# Patient Record
Sex: FEMALE | Race: BLACK OR AFRICAN AMERICAN | Employment: OTHER | URBAN - METROPOLITAN AREA
[De-identification: names, ages, dates, MRNs, and addresses within clinical notes are randomized per-mention and may not be internally consistent; named-entity substitution may affect disease eponyms.]

---

## 2017-12-30 ENCOUNTER — HOSPITAL ENCOUNTER (OUTPATIENT)
Dept: CT IMAGING | Facility: HOSPITAL | Age: 72
Discharge: HOME | End: 2017-12-30
Attending: SPECIALIST
Payer: MEDICARE

## 2017-12-30 ENCOUNTER — HOSPITAL ENCOUNTER (OUTPATIENT)
Dept: MAMMOGRAPHY | Facility: HOSPITAL | Age: 72
Discharge: HOME | End: 2017-12-30
Attending: SPECIALIST
Payer: MEDICARE

## 2017-12-30 DIAGNOSIS — Z12.39 ENCOUNTER FOR SCREENING FOR MALIGNANT NEOPLASM OF BREAST: ICD-10-CM

## 2017-12-30 DIAGNOSIS — R91.8 LUNG MASS: ICD-10-CM

## 2017-12-30 LAB — CREAT SERPL-MCNC: 0.9 MG/DL (ref 0.55–1.02)

## 2017-12-30 PROCEDURE — 82565 ASSAY OF CREATININE: CPT

## 2017-12-30 PROCEDURE — 71260 CT THORAX DX C+: CPT | Performed by: SPECIALIST

## 2017-12-30 PROCEDURE — 77067 SCR MAMMO BI INCL CAD: CPT | Performed by: SPECIALIST

## 2018-02-20 ENCOUNTER — APPOINTMENT (OUTPATIENT)
Dept: GENERAL RADIOLOGY | Facility: HOSPITAL | Age: 73
End: 2018-02-20
Attending: EMERGENCY MEDICINE
Payer: MEDICARE

## 2018-02-20 ENCOUNTER — HOSPITAL ENCOUNTER (EMERGENCY)
Facility: HOSPITAL | Age: 73
Discharge: HOME | End: 2018-02-20
Attending: EMERGENCY MEDICINE
Payer: MEDICARE

## 2018-02-20 VITALS
SYSTOLIC BLOOD PRESSURE: 148 MMHG | HEIGHT: 64 IN | WEIGHT: 198 LBS | DIASTOLIC BLOOD PRESSURE: 70 MMHG | TEMPERATURE: 98 F | BODY MASS INDEX: 33.8 KG/M2 | OXYGEN SATURATION: 98 % | HEART RATE: 88 BPM | RESPIRATION RATE: 18 BRPM

## 2018-02-20 DIAGNOSIS — D86.9 SARCOIDOSIS: ICD-10-CM

## 2018-02-20 DIAGNOSIS — J40 BRONCHITIS: Primary | ICD-10-CM

## 2018-02-20 LAB
ALBUMIN SERPL-MCNC: 3.4 G/DL (ref 3.5–4.8)
ALP LIVER SERPL-CCNC: 115 U/L (ref 55–142)
ALT SERPL-CCNC: 17 U/L (ref 14–54)
AST SERPL-CCNC: 18 U/L (ref 15–41)
BASOPHILS # BLD AUTO: 0.04 X10(3) UL (ref 0–0.1)
BASOPHILS NFR BLD AUTO: 0.4 %
BILIRUB SERPL-MCNC: 0.4 MG/DL (ref 0.1–2)
BUN BLD-MCNC: 15 MG/DL (ref 8–20)
CALCIUM BLD-MCNC: 8.9 MG/DL (ref 8.3–10.3)
CHLORIDE: 109 MMOL/L (ref 101–111)
CO2: 29 MMOL/L (ref 22–32)
CREAT BLD-MCNC: 0.91 MG/DL (ref 0.55–1.02)
EOSINOPHIL # BLD AUTO: 0.21 X10(3) UL (ref 0–0.3)
EOSINOPHIL NFR BLD AUTO: 2.3 %
ERYTHROCYTE [DISTWIDTH] IN BLOOD BY AUTOMATED COUNT: 12.5 % (ref 11.5–16)
GLUCOSE BLD-MCNC: 101 MG/DL (ref 70–99)
HCT VFR BLD AUTO: 35.5 % (ref 34–50)
HGB BLD-MCNC: 11.2 G/DL (ref 12–16)
IMMATURE GRANULOCYTE COUNT: 0.01 X10(3) UL (ref 0–1)
IMMATURE GRANULOCYTE RATIO %: 0.1 %
LYMPHOCYTES # BLD AUTO: 4.88 X10(3) UL (ref 0.9–4)
LYMPHOCYTES NFR BLD AUTO: 52.6 %
M PROTEIN MFR SERPL ELPH: 7.7 G/DL (ref 6.1–8.3)
MCH RBC QN AUTO: 27.9 PG (ref 27–33.2)
MCHC RBC AUTO-ENTMCNC: 31.5 G/DL (ref 31–37)
MCV RBC AUTO: 88.5 FL (ref 81–100)
MONOCYTES # BLD AUTO: 0.96 X10(3) UL (ref 0.1–1)
MONOCYTES NFR BLD AUTO: 10.3 %
NEUTROPHIL ABS PRELIM: 3.18 X10 (3) UL (ref 1.3–6.7)
NEUTROPHILS # BLD AUTO: 3.18 X10(3) UL (ref 1.3–6.7)
NEUTROPHILS NFR BLD AUTO: 34.3 %
PLATELET # BLD AUTO: 274 10(3)UL (ref 150–450)
POTASSIUM SERPL-SCNC: 3.7 MMOL/L (ref 3.6–5.1)
RBC # BLD AUTO: 4.01 X10(6)UL (ref 3.8–5.1)
RED CELL DISTRIBUTION WIDTH-SD: 40 FL (ref 35.1–46.3)
SODIUM SERPL-SCNC: 143 MMOL/L (ref 136–144)
TROPONIN: <0.046 NG/ML (ref ?–0.05)
WBC # BLD AUTO: 9.3 X10(3) UL (ref 4–13)

## 2018-02-20 PROCEDURE — 99284 EMERGENCY DEPT VISIT MOD MDM: CPT

## 2018-02-20 PROCEDURE — 80053 COMPREHEN METABOLIC PANEL: CPT | Performed by: EMERGENCY MEDICINE

## 2018-02-20 PROCEDURE — 94640 AIRWAY INHALATION TREATMENT: CPT

## 2018-02-20 PROCEDURE — 71045 X-RAY EXAM CHEST 1 VIEW: CPT | Performed by: EMERGENCY MEDICINE

## 2018-02-20 PROCEDURE — 84484 ASSAY OF TROPONIN QUANT: CPT | Performed by: EMERGENCY MEDICINE

## 2018-02-20 PROCEDURE — 85025 COMPLETE CBC W/AUTO DIFF WBC: CPT | Performed by: EMERGENCY MEDICINE

## 2018-02-20 PROCEDURE — 36415 COLL VENOUS BLD VENIPUNCTURE: CPT

## 2018-02-20 PROCEDURE — 94664 DEMO&/EVAL PT USE INHALER: CPT

## 2018-02-20 RX ORDER — METOCLOPRAMIDE HYDROCHLORIDE 5 MG/5ML
5 SOLUTION ORAL
COMMUNITY
End: 2019-01-02

## 2018-02-20 RX ORDER — ALBUTEROL SULFATE 90 UG/1
AEROSOL, METERED RESPIRATORY (INHALATION) EVERY 6 HOURS PRN
COMMUNITY
End: 2018-12-11 | Stop reason: ALTCHOICE

## 2018-02-20 RX ORDER — IPRATROPIUM BROMIDE AND ALBUTEROL SULFATE 2.5; .5 MG/3ML; MG/3ML
3 SOLUTION RESPIRATORY (INHALATION) ONCE
Status: COMPLETED | OUTPATIENT
Start: 2018-02-20 | End: 2018-02-20

## 2018-02-20 RX ORDER — ALBUTEROL SULFATE 2.5 MG/3ML
SOLUTION RESPIRATORY (INHALATION) EVERY 6 HOURS PRN
COMMUNITY
End: 2019-09-20

## 2018-02-20 RX ORDER — PREDNISONE 20 MG/1
60 TABLET ORAL ONCE
Status: COMPLETED | OUTPATIENT
Start: 2018-02-20 | End: 2018-02-20

## 2018-02-20 RX ORDER — AZITHROMYCIN 250 MG/1
TABLET, FILM COATED ORAL
Qty: 1 PACKAGE | Refills: 0 | Status: SHIPPED | OUTPATIENT
Start: 2018-02-20 | End: 2018-04-18

## 2018-02-20 RX ORDER — PREDNISONE 20 MG/1
20 TABLET ORAL ONCE
Status: DISCONTINUED | OUTPATIENT
Start: 2018-02-20 | End: 2018-02-20

## 2018-02-20 RX ORDER — HYDROCODONE BITARTRATE AND ACETAMINOPHEN 10; 300 MG/1; MG/1
TABLET ORAL
COMMUNITY
End: 2019-01-02

## 2018-02-20 RX ORDER — POTASSIUM CHLORIDE 750 MG/1
10 TABLET, EXTENDED RELEASE ORAL 2 TIMES DAILY
COMMUNITY
End: 2019-01-02

## 2018-02-20 RX ORDER — CODEINE PHOSPHATE AND GUAIFENESIN 10; 100 MG/5ML; MG/5ML
5 SOLUTION ORAL EVERY 6 HOURS PRN
Qty: 180 ML | Refills: 0 | Status: SHIPPED | OUTPATIENT
Start: 2018-02-20 | End: 2018-04-18

## 2018-02-20 RX ORDER — LYSINE HCL 500 MG
1 TABLET ORAL DAILY
COMMUNITY
End: 2019-09-20

## 2018-02-20 RX ORDER — METHYLPREDNISOLONE 4 MG/1
TABLET ORAL
Qty: 1 PACKAGE | Refills: 0 | Status: SHIPPED | OUTPATIENT
Start: 2018-02-20 | End: 2018-04-18

## 2018-02-20 RX ORDER — VALSARTAN 160 MG/1
160 TABLET ORAL DAILY
COMMUNITY
End: 2019-01-02

## 2018-02-20 RX ORDER — PREDNISONE 20 MG/1
60 TABLET ORAL ONCE
Status: DISCONTINUED | OUTPATIENT
Start: 2018-02-20 | End: 2018-02-20

## 2018-02-20 RX ORDER — FUROSEMIDE 40 MG/1
20 TABLET ORAL DAILY
COMMUNITY
End: 2019-01-02

## 2018-02-20 NOTE — ED PROVIDER NOTES
Patient Seen in: [facility] Emergency Department    History   Patient presents with:  Dyspnea IRIS SOB (respiratory)  Cough/URI    Stated Complaint: cough/dyspnea    HPI    69-year-old female presents emergency department who is here for shortness of pharynx  Neck: Supple no JVD no lymphadenopathy no meningismus no carotid bruit  CV: Regular rate and rhythm no murmur rub  Respiratory: Coarse breath sounds bilaterally. There is some crackles in the right base.   Abdomen: Soft nontender nondistended, no She was requesting some steroids along with an antibiotic. I will give her a Z-Alin along with Medrol Dosepak. She did receive 60 mg of prednisone here.   She is very comfortable going home at this point but told her if she starts to worsen she definitely

## 2018-02-20 NOTE — ED NOTES
Patient is a very difficult IV stick Dr. Vero Gomes is aware.  PO medication will be given blood was sent

## 2018-04-18 ENCOUNTER — HOSPITAL ENCOUNTER (EMERGENCY)
Facility: HOSPITAL | Age: 73
Discharge: HOME | End: 2018-04-18
Attending: EMERGENCY MEDICINE
Payer: MEDICARE

## 2018-04-18 ENCOUNTER — APPOINTMENT (OUTPATIENT)
Dept: GENERAL RADIOLOGY | Facility: HOSPITAL | Age: 73
End: 2018-04-18
Payer: MEDICARE

## 2018-04-18 VITALS
RESPIRATION RATE: 16 BRPM | HEART RATE: 78 BPM | WEIGHT: 190 LBS | SYSTOLIC BLOOD PRESSURE: 175 MMHG | DIASTOLIC BLOOD PRESSURE: 89 MMHG | TEMPERATURE: 98 F | HEIGHT: 64 IN | OXYGEN SATURATION: 98 % | BODY MASS INDEX: 32.44 KG/M2

## 2018-04-18 DIAGNOSIS — J18.9 COMMUNITY ACQUIRED PNEUMONIA, UNSPECIFIED LATERALITY: Primary | ICD-10-CM

## 2018-04-18 PROCEDURE — 71045 X-RAY EXAM CHEST 1 VIEW: CPT

## 2018-04-18 PROCEDURE — 85025 COMPLETE CBC W/AUTO DIFF WBC: CPT | Performed by: EMERGENCY MEDICINE

## 2018-04-18 PROCEDURE — 99285 EMERGENCY DEPT VISIT HI MDM: CPT

## 2018-04-18 PROCEDURE — 80053 COMPREHEN METABOLIC PANEL: CPT

## 2018-04-18 PROCEDURE — 93010 ELECTROCARDIOGRAM REPORT: CPT

## 2018-04-18 PROCEDURE — 36415 COLL VENOUS BLD VENIPUNCTURE: CPT

## 2018-04-18 PROCEDURE — 84484 ASSAY OF TROPONIN QUANT: CPT | Performed by: EMERGENCY MEDICINE

## 2018-04-18 PROCEDURE — 80053 COMPREHEN METABOLIC PANEL: CPT | Performed by: EMERGENCY MEDICINE

## 2018-04-18 PROCEDURE — 85025 COMPLETE CBC W/AUTO DIFF WBC: CPT

## 2018-04-18 PROCEDURE — 84484 ASSAY OF TROPONIN QUANT: CPT

## 2018-04-18 PROCEDURE — 93005 ELECTROCARDIOGRAM TRACING: CPT

## 2018-04-18 RX ORDER — CEFDINIR 300 MG/1
300 CAPSULE ORAL 2 TIMES DAILY
Qty: 20 CAPSULE | Refills: 0 | Status: SHIPPED | OUTPATIENT
Start: 2018-04-18 | End: 2018-04-28

## 2018-04-18 RX ORDER — CEFDINIR 300 MG/1
300 CAPSULE ORAL ONCE
Status: COMPLETED | OUTPATIENT
Start: 2018-04-18 | End: 2018-04-18

## 2018-04-18 NOTE — ED INITIAL ASSESSMENT (HPI)
Patient reports productive cough since Monday, green in color and sometimes blood tinged. Reports hx of pneumonia and sarcoidosis. Reports pain to back with deep breaths. Denies fever.

## 2018-04-19 NOTE — ED PROVIDER NOTES
Patient Seen in: [facility] Emergency Department    History   Patient presents with:  Cough/URI    Stated Complaint: cough, dyspnea, pain with deeo inspiration    HPI    Since to the emergency department with cough productive of green sputum since M in HPI. Constitutional and vital signs reviewed. All other systems reviewed and negative except as noted above.     Physical Exam   ED Triage Vitals [04/18/18 1802]  BP: 142/68  Pulse: 85  Resp: 20  Temp: 97.8 °F (36.6 °C)  Temp src: Temporal  SpO2: 9 normal limits   CBC W/ DIFFERENTIAL - Abnormal; Notable for the following:     Neutrophil Absolute Prelim 7.28 (*)     Neutrophil Absolute 7.28 (*)     All other components within normal limits   TROPONIN I - Normal   CBC WITH DIFFERENTIAL WITH PLATELET stable. No sign of pneumothorax.                         Dictated by: Sarah Villanueva MD on 4/18/2018 at 19:44         Approved by: Sarah Villanueva MD                   [facility]     Patient presents to the emergency department with a cough productive of green s

## 2018-05-01 ENCOUNTER — HOSPITAL ENCOUNTER (INPATIENT)
Facility: HOSPITAL | Age: 73
LOS: 3 days | Discharge: HOME HEALTH | DRG: 194 | End: 2018-05-05
Admitting: SPECIALIST
Payer: MEDICARE

## 2018-05-01 ENCOUNTER — APPOINTMENT (OUTPATIENT)
Dept: GENERAL RADIOLOGY | Facility: HOSPITAL | Age: 73
DRG: 194 | End: 2018-05-01
Payer: MEDICARE

## 2018-05-01 DIAGNOSIS — J18.9 COMMUNITY ACQUIRED PNEUMONIA OF RIGHT UPPER LOBE OF LUNG: Primary | ICD-10-CM

## 2018-05-01 DIAGNOSIS — D86.9 SARCOID: ICD-10-CM

## 2018-05-01 PROCEDURE — 84484 ASSAY OF TROPONIN QUANT: CPT

## 2018-05-01 PROCEDURE — 83880 ASSAY OF NATRIURETIC PEPTIDE: CPT

## 2018-05-01 PROCEDURE — 93010 ELECTROCARDIOGRAM REPORT: CPT

## 2018-05-01 PROCEDURE — 99285 EMERGENCY DEPT VISIT HI MDM: CPT

## 2018-05-01 PROCEDURE — 93005 ELECTROCARDIOGRAM TRACING: CPT

## 2018-05-01 PROCEDURE — 80053 COMPREHEN METABOLIC PANEL: CPT

## 2018-05-01 PROCEDURE — 87040 BLOOD CULTURE FOR BACTERIA: CPT

## 2018-05-01 PROCEDURE — 81001 URINALYSIS AUTO W/SCOPE: CPT

## 2018-05-01 PROCEDURE — 87086 URINE CULTURE/COLONY COUNT: CPT

## 2018-05-01 PROCEDURE — 85730 THROMBOPLASTIN TIME PARTIAL: CPT

## 2018-05-01 PROCEDURE — 85610 PROTHROMBIN TIME: CPT

## 2018-05-01 PROCEDURE — 36415 COLL VENOUS BLD VENIPUNCTURE: CPT

## 2018-05-01 PROCEDURE — 96375 TX/PRO/DX INJ NEW DRUG ADDON: CPT

## 2018-05-01 PROCEDURE — 85025 COMPLETE CBC W/AUTO DIFF WBC: CPT

## 2018-05-01 PROCEDURE — 83605 ASSAY OF LACTIC ACID: CPT

## 2018-05-01 PROCEDURE — 94640 AIRWAY INHALATION TREATMENT: CPT

## 2018-05-01 PROCEDURE — 71045 X-RAY EXAM CHEST 1 VIEW: CPT

## 2018-05-01 PROCEDURE — 82164 ANGIOTENSIN I ENZYME TEST: CPT | Performed by: INTERNAL MEDICINE

## 2018-05-01 PROCEDURE — 96365 THER/PROPH/DIAG IV INF INIT: CPT

## 2018-05-01 RX ORDER — IPRATROPIUM BROMIDE AND ALBUTEROL SULFATE 2.5; .5 MG/3ML; MG/3ML
3 SOLUTION RESPIRATORY (INHALATION) ONCE
Status: COMPLETED | OUTPATIENT
Start: 2018-05-01 | End: 2018-05-01

## 2018-05-01 RX ORDER — LEVOFLOXACIN 5 MG/ML
750 INJECTION, SOLUTION INTRAVENOUS EVERY 24 HOURS
Status: DISCONTINUED | OUTPATIENT
Start: 2018-05-01 | End: 2018-05-02

## 2018-05-02 ENCOUNTER — APPOINTMENT (OUTPATIENT)
Dept: CT IMAGING | Facility: HOSPITAL | Age: 73
DRG: 194 | End: 2018-05-02
Payer: MEDICARE

## 2018-05-02 PROBLEM — D86.9 SARCOID: Status: ACTIVE | Noted: 2018-05-02

## 2018-05-02 PROCEDURE — 87798 DETECT AGENT NOS DNA AMP: CPT

## 2018-05-02 PROCEDURE — 94664 DEMO&/EVAL PT USE INHALER: CPT

## 2018-05-02 PROCEDURE — S0028 INJECTION, FAMOTIDINE, 20 MG: HCPCS

## 2018-05-02 PROCEDURE — 94640 AIRWAY INHALATION TREATMENT: CPT

## 2018-05-02 PROCEDURE — 84145 PROCALCITONIN (PCT): CPT | Performed by: INTERNAL MEDICINE

## 2018-05-02 PROCEDURE — 87633 RESP VIRUS 12-25 TARGETS: CPT

## 2018-05-02 PROCEDURE — 80053 COMPREHEN METABOLIC PANEL: CPT | Performed by: INTERNAL MEDICINE

## 2018-05-02 PROCEDURE — 85652 RBC SED RATE AUTOMATED: CPT | Performed by: INTERNAL MEDICINE

## 2018-05-02 PROCEDURE — 87999 UNLISTED MICROBIOLOGY PX: CPT

## 2018-05-02 PROCEDURE — 86140 C-REACTIVE PROTEIN: CPT | Performed by: INTERNAL MEDICINE

## 2018-05-02 PROCEDURE — 84132 ASSAY OF SERUM POTASSIUM: CPT | Performed by: INTERNAL MEDICINE

## 2018-05-02 PROCEDURE — 85025 COMPLETE CBC W/AUTO DIFF WBC: CPT | Performed by: INTERNAL MEDICINE

## 2018-05-02 PROCEDURE — 83880 ASSAY OF NATRIURETIC PEPTIDE: CPT | Performed by: INTERNAL MEDICINE

## 2018-05-02 PROCEDURE — 87486 CHLMYD PNEUM DNA AMP PROBE: CPT

## 2018-05-02 PROCEDURE — 85378 FIBRIN DEGRADE SEMIQUANT: CPT | Performed by: INTERNAL MEDICINE

## 2018-05-02 PROCEDURE — 87581 M.PNEUMON DNA AMP PROBE: CPT

## 2018-05-02 RX ORDER — DIPHENHYDRAMINE HYDROCHLORIDE 50 MG/ML
25 INJECTION INTRAMUSCULAR; INTRAVENOUS EVERY 6 HOURS PRN
Status: DISCONTINUED | OUTPATIENT
Start: 2018-05-02 | End: 2018-05-05

## 2018-05-02 RX ORDER — ACETAMINOPHEN 325 MG/1
650 TABLET ORAL EVERY 6 HOURS PRN
Status: DISCONTINUED | OUTPATIENT
Start: 2018-05-02 | End: 2018-05-05

## 2018-05-02 RX ORDER — HEPARIN SODIUM 5000 [USP'U]/ML
5000 INJECTION, SOLUTION INTRAVENOUS; SUBCUTANEOUS 2 TIMES DAILY
Status: DISCONTINUED | OUTPATIENT
Start: 2018-05-02 | End: 2018-05-05

## 2018-05-02 RX ORDER — METHYLPREDNISOLONE SODIUM SUCCINATE 125 MG/2ML
80 INJECTION, POWDER, LYOPHILIZED, FOR SOLUTION INTRAMUSCULAR; INTRAVENOUS EVERY 8 HOURS
Status: DISCONTINUED | OUTPATIENT
Start: 2018-05-02 | End: 2018-05-02

## 2018-05-02 RX ORDER — FAMOTIDINE 10 MG/ML
20 INJECTION, SOLUTION INTRAVENOUS ONCE
Status: COMPLETED | OUTPATIENT
Start: 2018-05-02 | End: 2018-05-02

## 2018-05-02 RX ORDER — HYDROCODONE BITARTRATE AND ACETAMINOPHEN 10; 325 MG/1; MG/1
1 TABLET ORAL EVERY 6 HOURS PRN
Status: DISCONTINUED | OUTPATIENT
Start: 2018-05-02 | End: 2018-05-05

## 2018-05-02 RX ORDER — POTASSIUM CHLORIDE 20 MEQ/1
40 TABLET, EXTENDED RELEASE ORAL EVERY 4 HOURS
Status: COMPLETED | OUTPATIENT
Start: 2018-05-02 | End: 2018-05-02

## 2018-05-02 RX ORDER — ACETAMINOPHEN 500 MG
1000 TABLET ORAL ONCE
Status: COMPLETED | OUTPATIENT
Start: 2018-05-02 | End: 2018-05-02

## 2018-05-02 RX ORDER — METHYLPREDNISOLONE SODIUM SUCCINATE 125 MG/2ML
60 INJECTION, POWDER, LYOPHILIZED, FOR SOLUTION INTRAMUSCULAR; INTRAVENOUS EVERY 12 HOURS
Status: DISCONTINUED | OUTPATIENT
Start: 2018-05-02 | End: 2018-05-04

## 2018-05-02 RX ORDER — SODIUM PHOSPHATE, DIBASIC AND SODIUM PHOSPHATE, MONOBASIC 7; 19 G/133ML; G/133ML
1 ENEMA RECTAL ONCE AS NEEDED
Status: ACTIVE | OUTPATIENT
Start: 2018-05-02 | End: 2018-05-02

## 2018-05-02 RX ORDER — SULFAMETHOXAZOLE AND TRIMETHOPRIM 80; 16 MG/ML; MG/ML
320 INJECTION, SOLUTION, CONCENTRATE INTRAVENOUS ONCE
Status: COMPLETED | OUTPATIENT
Start: 2018-05-02 | End: 2018-05-02

## 2018-05-02 RX ORDER — SULFAMETHOXAZOLE AND TRIMETHOPRIM 80; 16 MG/ML; MG/ML
320 INJECTION, SOLUTION, CONCENTRATE INTRAVENOUS EVERY 8 HOURS
Status: DISCONTINUED | OUTPATIENT
Start: 2018-05-02 | End: 2018-05-02

## 2018-05-02 RX ORDER — DOCUSATE SODIUM 100 MG/1
100 CAPSULE, LIQUID FILLED ORAL 2 TIMES DAILY
Status: DISCONTINUED | OUTPATIENT
Start: 2018-05-02 | End: 2018-05-05

## 2018-05-02 RX ORDER — SULFAMETHOXAZOLE AND TRIMETHOPRIM 80; 16 MG/ML; MG/ML
80 INJECTION, SOLUTION, CONCENTRATE INTRAVENOUS EVERY 6 HOURS
Status: DISCONTINUED | OUTPATIENT
Start: 2018-05-02 | End: 2018-05-02 | Stop reason: SDUPTHER

## 2018-05-02 RX ORDER — METOCLOPRAMIDE 5 MG/1
5 TABLET ORAL
Status: DISCONTINUED | OUTPATIENT
Start: 2018-05-02 | End: 2018-05-05

## 2018-05-02 RX ORDER — METOCLOPRAMIDE HYDROCHLORIDE 5 MG/5ML
5 SOLUTION ORAL
Status: DISCONTINUED | OUTPATIENT
Start: 2018-05-02 | End: 2018-05-02

## 2018-05-02 RX ORDER — METHYLPREDNISOLONE SODIUM SUCCINATE 125 MG/2ML
125 INJECTION, POWDER, LYOPHILIZED, FOR SOLUTION INTRAMUSCULAR; INTRAVENOUS ONCE
Status: COMPLETED | OUTPATIENT
Start: 2018-05-02 | End: 2018-05-02

## 2018-05-02 RX ORDER — FUROSEMIDE 40 MG/1
40 TABLET ORAL
Status: DISCONTINUED | OUTPATIENT
Start: 2018-05-02 | End: 2018-05-03

## 2018-05-02 RX ORDER — LOSARTAN POTASSIUM 100 MG/1
100 TABLET ORAL DAILY
Status: DISCONTINUED | OUTPATIENT
Start: 2018-05-02 | End: 2018-05-05

## 2018-05-02 RX ORDER — BISACODYL 10 MG
10 SUPPOSITORY, RECTAL RECTAL
Status: DISCONTINUED | OUTPATIENT
Start: 2018-05-02 | End: 2018-05-05

## 2018-05-02 RX ORDER — POTASSIUM CHLORIDE 20 MEQ/1
40 TABLET, EXTENDED RELEASE ORAL ONCE
Status: COMPLETED | OUTPATIENT
Start: 2018-05-02 | End: 2018-05-02

## 2018-05-02 RX ORDER — ALBUTEROL SULFATE 90 UG/1
2 AEROSOL, METERED RESPIRATORY (INHALATION) EVERY 6 HOURS PRN
Status: DISCONTINUED | OUTPATIENT
Start: 2018-05-02 | End: 2018-05-05

## 2018-05-02 RX ORDER — DIPHENHYDRAMINE HYDROCHLORIDE 50 MG/ML
25 INJECTION INTRAMUSCULAR; INTRAVENOUS ONCE
Status: COMPLETED | OUTPATIENT
Start: 2018-05-02 | End: 2018-05-02

## 2018-05-02 RX ORDER — HYDRALAZINE HYDROCHLORIDE 20 MG/ML
10 INJECTION INTRAMUSCULAR; INTRAVENOUS EVERY 6 HOURS PRN
Status: DISCONTINUED | OUTPATIENT
Start: 2018-05-02 | End: 2018-05-05

## 2018-05-02 RX ORDER — ALBUTEROL SULFATE 2.5 MG/3ML
2.5 SOLUTION RESPIRATORY (INHALATION)
Status: DISCONTINUED | OUTPATIENT
Start: 2018-05-02 | End: 2018-05-05

## 2018-05-02 RX ORDER — POTASSIUM CHLORIDE 750 MG/1
10 TABLET, EXTENDED RELEASE ORAL 2 TIMES DAILY
Status: DISCONTINUED | OUTPATIENT
Start: 2018-05-02 | End: 2018-05-05

## 2018-05-02 RX ORDER — MULTIPLE VITAMINS W/ MINERALS TAB 9MG-400MCG
1 TAB ORAL DAILY
Status: DISCONTINUED | OUTPATIENT
Start: 2018-05-02 | End: 2018-05-05

## 2018-05-02 NOTE — PROGRESS NOTES
[facility] Pharmacy Note:  Renal Adjustment for sulfamethoxazole-trimethoprim (BACTRIM)    Kelsie Child is a 68year old female who has been prescribed sulfamethoxazole-trimethoprim (BACTRIM) 320 mg every 12 hrs.   CrCl is estimated creatinine clearance i

## 2018-05-02 NOTE — ED INITIAL ASSESSMENT (HPI)
Pt to ED c/o \"not feeling better\". Pt was seen here on 4/18 for Pneumonia, Cefdinir tx completed. Pt states still having productive cough with green sputum. Denies fever but feels chills.

## 2018-05-02 NOTE — CONSULTS
[facility]  Report of Consultation    Dinajodie DanNaty Patient Status:  Inpatient    1945 MRN [id number]   [facility] 4NW-A Attending Delmy Castaneda MD   Hosp Day # 0 PCP Genaro Clark MD     Reason for Consultation: Co hospitalization. It would appear that she refused bronchoscopy and both occasions and the site of the bleeding is not clear (although there is compression of her right pulmonary artery by lymph nodes in the hilar/mediastinal region).     PAST HISTORY:    · and weight loss. Eyes: Negative for visual disturbance, irritation and redness. Ears, nose, mouth, throat, and face: Negative for hearing loss, tinnitus, nasal congestion, snoring, sore throat, hoarseness and voice change. Respiratory: See HPI above.   C hemithorax without evidence of wheezing or consolidation   Chest wall: No tenderness or deformity. Heart: Regular rate and rhythm, normal S1S2, no murmur. Abdomen: soft, non-tender, non-distended, no masses, no guarding, no     rebound, positive BS. infection. There are also significant chronic underlying changes related to her previous lung issues-presumably related to sarcoid.   Her history is not terribly convincing for pulmonary emboli but this too is on the differential.  We will continue to foll

## 2018-05-02 NOTE — PLAN OF CARE
RESPIRATORY - ADULT    • Achieves optimal ventilation and oxygenation Progressing        Flu panel results negative, medicated for c/o back discomfort , states allergy to levoquin , dr Erasto Oh notified , orders recvd , states last BM = Saturday , dr Joyce Sinha

## 2018-05-02 NOTE — H&P
[facility]    PATIENT'S NAME: Georgi LocoMike   ATTENDING PHYSICIAN: Marilyn Coello M.D.    PATIENT ACCOUNT#:   [de-identified]    LOCATION:  [address] [place] [address]  MEDICAL RECORD #:   [id number]       YOB: 1945  ADMISSION DATE:       05/0 creatinine 1.07, glucose 158, albumin 3.4, potassium 3.3. Hemoglobin 11.9, WBC 9.3, platelets [phone]. IMPRESSION AND PLAN:  Elderly female currently admitted with:    1. Pneumonia. Plan:  IV antibiotics. Oxygen, nebs as needed. Pulmonary consult.

## 2018-05-02 NOTE — ED PROVIDER NOTES
Patient Seen in: [facility] Emergency Department    History   Patient presents with:  Pneumonia    Stated Complaint: pneumonia    HPI    Pleasant female with history of sarcoid, history of pneumonia, presenting to this emergency department about a w Location: Right arm)   Pulse 88   Temp 98.3 °F (36.8 °C) (Oral)   Resp 18   Ht 163.8 cm (5' 4.5\")   Wt 91.9 kg   SpO2 95%   BMI 34.22 kg/m²         Physical Exam    GENERAL APPEARANCE:     Well developed, well nourished, alert       Head: Normocephalic an PLATELET.   Procedure                               Abnormality         Status                     ---------                               -----------         ------                     CBC W/ DIFFERENTIAL[[id number]]          Abnormal            Final resul tortuous, ectatic thoracic aorta. Atheromatous changes are seen in the aorta. The patient is rotated to the right. No large pleural effusion is appreciated. Calcified  mediastinal lymph nodes are noted consistent with old granulomatous disease.       CO time.      [facility]     Admission disposition: 5/2/2018  1:47 AM         I spoke with the primary care physician, Dr. Yogesh Montague is covering for admission, for COPD/pneumonia, with recommendation that the patient have a CT chest if she changes her mind, but at th

## 2018-05-02 NOTE — ED NOTES
Pt a difficult labs/IV stick, unsuccessful IV attempt by 2 RN's.  Eulogio Zayas, APCT at bedside attempting US IV
Pt asleep, rouses easy to verbal stimuli. States she feels better, \"headache is down to 5/10. \" No distress noted.  Awaiting IV Bactrim from pharmacy
Pt denies any shortness of breath, chest tightness, itching or rash at this time. Will continue to monitor patient for any other allergic reaction symptoms.
Pt reports feeling \"hot\", \"heavy in the chest\" and \"dizzy\". This RN asked if patient had rec'd any pain medications, she reports no. Pt asked what is infusing in her IV. This RN told patient levaquin. Pt states that she is allergic to Levaquin.
Pt returned from CT. Per CT staff, pt refused Chest CT. Pt states, \"I'm having a headache and my chest still feels heavy from the reaction to Levaquin, I don't want any more medicines like the contrast to make it feel worse.  I'm staying overnight anyway,
Pt to CT scan
Report called to IRINA Meléndez.  Pt updated of status
GOAL: Pt strength will increase by 1/2 grade in two weeks.

## 2018-05-02 NOTE — PAYOR COMM NOTE
--------------  ADMISSION REVIEW     Payor: Theo Bonds    5/2    ED      Pneumonia     Stated Complaint: pneumonia        Pleasant female with history of sarcoid, history of pneumonia, presenting to this emergency department about a week and half after   The following orders were created for panel order CBC WITH DIFFERENTIAL WITH PLATELET.   Procedure                               Abnormality         Status                     ---------                               -----------         ------

## 2018-05-02 NOTE — CM/SW NOTE
05/02/18 1400   CM/SW Referral Data   Referral Source Social Work (self-referral)   Reason for Referral Discharge planning   Informant Patient   Patient Info   Patient's Mental Status Alert;Oriented   Patient's Home Environment House   Patient lives wit

## 2018-05-03 ENCOUNTER — APPOINTMENT (OUTPATIENT)
Dept: CT IMAGING | Facility: HOSPITAL | Age: 73
DRG: 194 | End: 2018-05-03
Attending: INTERNAL MEDICINE
Payer: MEDICARE

## 2018-05-03 PROBLEM — G62.9 PERIPHERAL NEUROPATHY: Status: ACTIVE | Noted: 2018-05-03

## 2018-05-03 PROBLEM — I50.30 DIASTOLIC CHF (HCC): Status: ACTIVE | Noted: 2018-05-03

## 2018-05-03 PROBLEM — I10 BENIGN HYPERTENSION: Status: ACTIVE | Noted: 2018-05-03

## 2018-05-03 PROBLEM — E87.6 HYPOKALEMIA: Status: ACTIVE | Noted: 2018-05-03

## 2018-05-03 PROCEDURE — 80053 COMPREHEN METABOLIC PANEL: CPT | Performed by: INTERNAL MEDICINE

## 2018-05-03 PROCEDURE — 94640 AIRWAY INHALATION TREATMENT: CPT

## 2018-05-03 PROCEDURE — 97116 GAIT TRAINING THERAPY: CPT

## 2018-05-03 PROCEDURE — 87070 CULTURE OTHR SPECIMN AEROBIC: CPT | Performed by: INTERNAL MEDICINE

## 2018-05-03 PROCEDURE — 87205 SMEAR GRAM STAIN: CPT | Performed by: INTERNAL MEDICINE

## 2018-05-03 PROCEDURE — 97162 PT EVAL MOD COMPLEX 30 MIN: CPT

## 2018-05-03 PROCEDURE — 97165 OT EVAL LOW COMPLEX 30 MIN: CPT

## 2018-05-03 PROCEDURE — 85025 COMPLETE CBC W/AUTO DIFF WBC: CPT | Performed by: INTERNAL MEDICINE

## 2018-05-03 PROCEDURE — 97530 THERAPEUTIC ACTIVITIES: CPT

## 2018-05-03 PROCEDURE — 71275 CT ANGIOGRAPHY CHEST: CPT | Performed by: INTERNAL MEDICINE

## 2018-05-03 NOTE — PLAN OF CARE
RESPIRATORY - ADULT    • Achieves optimal ventilation and oxygenation Progressing        Pt A/ o x4. Room air sating 93- 95%. No cough. Unable to collect sputum culture. Iv abx given. Prn norco given for back pain with good relief.  Colace given for constip

## 2018-05-03 NOTE — CM/SW NOTE
SW received an order to obtain PFT's from [facility]. RN to follow up regarding this.

## 2018-05-03 NOTE — PROGRESS NOTES
[facility]  Progress Note    Arpan Boo Patient Status:  Inpatient    1945 MRN [id number]   [facility] 4NW-A Attending Isela Lomeli MD   Hosp Day # 1 PCP Herber Dejesus MD     Subjective:  Arpan Boo is Radiology:  Appears ess unchnaged 2/18  CTs reviewed 12/17      Medications reviewed     Assessment and Plan:   Patient Active Problem List:     Community acquired pneumonia of right upper lobe of lung ([facility] [address]     Sarcoid    Assessment and Plan:     1.  H

## 2018-05-03 NOTE — OCCUPATIONAL THERAPY NOTE
OCCUPATIONAL THERAPY QUICK EVALUATION - INPATIENT    Room Number: 416/416-A  Evaluation Date: 5/3/2018     Type of Evaluation: Quick Eval  Presenting Problem: PNA    Physician Order: IP Consult to Occupational Therapy  Reason for Therapy:  ADL/IADL Dysfunc self-stated goal is to go home     OBJECTIVE  Precautions: None  Fall Risk: Standard fall risk    WEIGHT BEARING RESTRICTION  Weight Bearing Restriction: None                PAIN ASSESSMENT  Ratin  Location: N/A       COGNITION  WFL    RANGE OF MOTION 24 indicating that pt is a good home D/C candidate based on 0 percentage of impairment.      Patient Complexity  Occupational Profile/Medical History  LOW - Brief history including review of medical or therapy records    Specific performance deficits impact

## 2018-05-03 NOTE — PROGRESS NOTES
[facility]  Progress Note    Asael Sanabria Patient Status:  Inpatient    1945 MRN [id number]   [facility] 4NW-A Attending Elvira Ornelas MD   Hosp Day # 1 PCP Jay Coffey MD         SUBJECTIVE:  Subjective:  Reji Holcomb CA  9.2  9.3   --   9.3   GLU  114*  158*   --   153*       Recent Labs   Lab  05/01/18   2231  05/02/18   0621  05/03/18   0623   ALT  16  16  14   AST  18  17  15   ALB  3.7  3.4*  3.3*       No results for input(s): PGLU in the last 72 hours.

## 2018-05-03 NOTE — PROGRESS NOTES
Pt AOx4. C/o chronic pain--given prn norco and warm packs with some relief. Ambulating in halls without difficulty. Having some SOB with exertion. Tolerating exercise well. Scheduled nebs. Records from pulmonology appointment last month requested via fax.

## 2018-05-04 ENCOUNTER — APPOINTMENT (OUTPATIENT)
Dept: GENERAL RADIOLOGY | Facility: HOSPITAL | Age: 73
DRG: 194 | End: 2018-05-04
Attending: INTERNAL MEDICINE
Payer: MEDICARE

## 2018-05-04 PROCEDURE — 94640 AIRWAY INHALATION TREATMENT: CPT

## 2018-05-04 PROCEDURE — 85025 COMPLETE CBC W/AUTO DIFF WBC: CPT | Performed by: INTERNAL MEDICINE

## 2018-05-04 PROCEDURE — 71045 X-RAY EXAM CHEST 1 VIEW: CPT | Performed by: INTERNAL MEDICINE

## 2018-05-04 PROCEDURE — 94664 DEMO&/EVAL PT USE INHALER: CPT

## 2018-05-04 PROCEDURE — 80048 BASIC METABOLIC PNL TOTAL CA: CPT | Performed by: INTERNAL MEDICINE

## 2018-05-04 RX ORDER — SODIUM CHLORIDE 30 MG/ML INHALATION SOLUTION 30 MG/ML
3 SOLUTION INHALANT
Status: DISCONTINUED | OUTPATIENT
Start: 2018-05-04 | End: 2018-05-05

## 2018-05-04 RX ORDER — GUAIFENESIN 600 MG
600 TABLET, EXTENDED RELEASE 12 HR ORAL 2 TIMES DAILY
Status: DISCONTINUED | OUTPATIENT
Start: 2018-05-04 | End: 2018-05-05

## 2018-05-04 RX ORDER — METHYLPREDNISOLONE SODIUM SUCCINATE 40 MG/ML
40 INJECTION, POWDER, LYOPHILIZED, FOR SOLUTION INTRAMUSCULAR; INTRAVENOUS EVERY 12 HOURS
Status: DISCONTINUED | OUTPATIENT
Start: 2018-05-04 | End: 2018-05-05

## 2018-05-04 NOTE — PROGRESS NOTES
[facility]  Progress Note    Uyen Romo Patient Status:  Inpatient    1945 MRN [id number]   [facility] 4NW-A Attending Tim Slater MD   Hosp Day # 2 PCP Bryson Gosselin, MD         SUBJECTIVE:  Subjective:  Jerardo Muir --   110  110   CO2  27.0  26.0   --   23.0  21.0*   BUN  16  16   --   16  16   CREATSERUM  1.10*  1.07*   --   0.91  0.85   CA  9.2  9.3   --   9.3  9.2   GLU  114*  158*   --   153*  126*       Recent Labs   Lab  05/01/18   2231  05/02/18   0621  05/03/

## 2018-05-04 NOTE — PLAN OF CARE
PAIN - ADULT    • Verbalizes/displays adequate comfort level or patient's stated pain goal Progressing        RESPIRATORY - ADULT    • Achieves optimal ventilation and oxygenation Progressing          Patient A+Ox4. On room air and sats in mid 90's.  Deonna canela

## 2018-05-04 NOTE — PROGRESS NOTES
[facility]  Progress Note    Larisa Salmeron Patient Status:  Inpatient    1945 MRN [id number]   [facility] 4NW-A Attending Renea Kirk MD   Hosp Day # 2 PCP Jan Salazar MD     Subjective:  Larisa Salmeron is 80   GFRNAA  52*   --   63  69   CA  9.3   --   9.3  9.2   NA  142   --   141  140   K  3.3*  3.3*  4.1  4.8  5.1   CL  107   --   110  110   CO2  26.0   --   23.0  21.0*         Recent Labs   Lab  05/01/18 2231   PTP  12.9   INR  0.93   PTT  28.7

## 2018-05-05 VITALS
HEIGHT: 64.5 IN | RESPIRATION RATE: 18 BRPM | HEART RATE: 83 BPM | TEMPERATURE: 98 F | BODY MASS INDEX: 34.15 KG/M2 | DIASTOLIC BLOOD PRESSURE: 68 MMHG | WEIGHT: 202.5 LBS | OXYGEN SATURATION: 92 % | SYSTOLIC BLOOD PRESSURE: 148 MMHG

## 2018-05-05 PROCEDURE — 85025 COMPLETE CBC W/AUTO DIFF WBC: CPT | Performed by: INTERNAL MEDICINE

## 2018-05-05 PROCEDURE — 94640 AIRWAY INHALATION TREATMENT: CPT

## 2018-05-05 PROCEDURE — 84132 ASSAY OF SERUM POTASSIUM: CPT | Performed by: SPECIALIST

## 2018-05-05 RX ORDER — CEFUROXIME AXETIL 500 MG/1
500 TABLET ORAL 2 TIMES DAILY
Qty: 10 TABLET | Refills: 0 | Status: SHIPPED | OUTPATIENT
Start: 2018-05-05 | End: 2018-05-10

## 2018-05-05 RX ORDER — PREDNISONE 20 MG/1
TABLET ORAL
Qty: 30 TABLET | Refills: 0 | Status: SHIPPED | OUTPATIENT
Start: 2018-05-05 | End: 2019-01-02

## 2018-05-05 RX ORDER — GUAIFENESIN 600 MG
600 TABLET, EXTENDED RELEASE 12 HR ORAL 2 TIMES DAILY
Qty: 60 TABLET | Refills: 0 | Status: SHIPPED | OUTPATIENT
Start: 2018-05-05 | End: 2019-01-02

## 2018-05-05 RX ORDER — SODIUM CHLORIDE 30 MG/ML INHALATION SOLUTION 30 MG/ML
3 SOLUTION INHALANT
Qty: 60 VIAL | Refills: 3 | Status: SHIPPED | OUTPATIENT
Start: 2018-05-05 | End: 2019-09-20

## 2018-05-05 NOTE — CM/SW NOTE
Received order for home health and nebulizer. Nebulizer order needs to be rewritten with specifics of nebulizer and supplies, frequency, for a duration of 99 months, diagnosis, and MD's NPI number. Information shared with pt's nurse [employer].  Spoke with pt re

## 2018-05-05 NOTE — CM/SW NOTE
05/05/18 1600   Discharge disposition   Expected discharge disposition Home-Health   Name of Facillity/Home Care/Hospice Residential   HME provider [address]   Discharge transportation Private car     [address] to f/u with nebulizer on Monday with pt.  [facility] to f/

## 2018-05-05 NOTE — PROGRESS NOTES
[facility]  Progress Note    Gopal Jiang Patient Status:  Inpatient    1945 MRN [id number]   [facility] 4NW-A Attending Paco Gould MD   Hosp Day # 3 PCP Michell Craig MD     Subjective:  Gopal Jiang is Recent Labs   Lab  05/03/18   0623  05/04/18   0618  05/05/18   0615   GLU  153*  126*   --    BUN  16  16   --    CREATSERUM  0.91  0.85   --    GFRAA  73  79   --    GFRNAA  63  69   --    CA  9.3  9.2   --    NA  141  140   --    K  4.8  5.1  4.2 Graham TYLER.  5/5/2018  2:01 PM

## 2018-05-05 NOTE — PROGRESS NOTES
Discharge summary completed and discussed , discharged for home per w/c accompanied by IDALIA PALACIOS [facility]

## 2018-05-05 NOTE — PLAN OF CARE
Impaired Functional Mobility    • Achieve highest/safest level of mobility/gait Adequate for Discharge        RESPIRATORY - ADULT    • Achieves optimal ventilation and oxygenation Adequate for Discharge        Up in chair , ambulates in hallway , chronic r

## 2018-05-05 NOTE — CM/SW NOTE
Received corrected order for nebulizer. Initially put through order to [address] but pt reported that her insurance told her to use [employer]. Order then submitted to [address] [place] [address].  They need to verify pt's insurance on Monday and cannot deliver before that

## 2018-05-05 NOTE — PROGRESS NOTES
[facility]  Progress Note    Harle Romberg Patient Status:  Inpatient    1945 MRN [id number]   [facility] 4NW-A Attending Roby Etienne MD   Hosp Day # 3 PCP Magalie Chen MD         SUBJECTIVE:  Subjective:  Radha Dunbar 0. 91  0.85   --    CA   --   9.3  9.2   --    GLU   --   153*  126*   --        Recent Labs   Lab  05/03/18   0623   ALT  14   AST  15   ALB  3.3*       No results for input(s): PGLU in the last 72 hours.                 Meds:     • GuaiFENesin ER  600 mg O

## 2018-05-05 NOTE — HOME CARE LIAISON
Received referral from [facility]. Met with pt at the bedside. She is agreeable to [facility] services at d/c. Brochure provided and any pt questions answered. Will follow.

## 2018-05-05 NOTE — PROGRESS NOTES
Alert,oriented. Patient complained of chronic back pain,Norco given with relief reported. Ambulating in hallways. IV antibiotics given per MD orders. Resting quietly in bed at this time.

## 2018-05-07 NOTE — PAYOR COMM NOTE
--------------  DISCHARGE REVIEW    Payor: Naman Fuller  Subscriber #:  BJORN MIRANDA CHATO  Authorization Number: [id number]    Admit date: 5/2/18  Admit time:  3090  Discharge Date: 5/5/2018  5:00 PM     Admitting Physician:  Roc Landry MD  Attending Shiv

## 2018-05-29 NOTE — DISCHARGE SUMMARY
[facility]  Discharge Summary    Desean Templeton Patient Status:  Inpatient    1945 MRN [id number]   [facility] 4NW-A Attending No att. providers found   Hosp Day # 3 PCP Jhony Mitchell MD     Date of Admission: 2018 Historical    fluticasone-salmeterol 230-21 MCG/ACT Inhalation Aerosol  Inhale into the lungs 2 (two) times daily. , Historical    valsartan 160 MG Oral Tab  Take 160 mg by mouth daily. , Historical    furosemide 40 MG Oral Tab  Take 40 mg by mouth 2 (two) t

## 2018-08-02 ENCOUNTER — OFFICE VISIT (OUTPATIENT)
Dept: FAMILY MEDICINE CLINIC | Facility: CLINIC | Age: 73
End: 2018-08-02

## 2018-08-02 VITALS
RESPIRATION RATE: 16 BRPM | BODY MASS INDEX: 35.68 KG/M2 | WEIGHT: 209 LBS | HEART RATE: 84 BPM | TEMPERATURE: 99 F | HEIGHT: 64 IN | DIASTOLIC BLOOD PRESSURE: 82 MMHG | SYSTOLIC BLOOD PRESSURE: 132 MMHG | OXYGEN SATURATION: 97 %

## 2018-08-02 DIAGNOSIS — Z02.9 ADMINISTRATIVE ENCOUNTER: Primary | ICD-10-CM

## 2018-08-02 RX ORDER — LOSARTAN POTASSIUM AND HYDROCHLOROTHIAZIDE 12.5; 5 MG/1; MG/1
1 TABLET ORAL
COMMUNITY
End: 2019-01-02

## 2018-08-02 RX ORDER — POTASSIUM CHLORIDE 750 MG/1
10 TABLET, FILM COATED, EXTENDED RELEASE ORAL
COMMUNITY
End: 2019-01-02

## 2018-08-02 NOTE — PROGRESS NOTES
Patient with SOB suspecting pneumonia, requesting to have imaging done. Discussed limitations of the [facility] and inability to order chest x-ray. Referred out to [facility] for imaging. Patient left in stable condition.

## 2018-12-11 ENCOUNTER — HOSPITAL ENCOUNTER (OUTPATIENT)
Age: 73
Discharge: HOME | End: 2018-12-11
Attending: FAMILY MEDICINE
Payer: MEDICARE

## 2018-12-11 ENCOUNTER — APPOINTMENT (OUTPATIENT)
Dept: GENERAL RADIOLOGY | Age: 73
End: 2018-12-11
Attending: FAMILY MEDICINE
Payer: MEDICARE

## 2018-12-11 VITALS
TEMPERATURE: 98 F | WEIGHT: 205 LBS | HEART RATE: 91 BPM | DIASTOLIC BLOOD PRESSURE: 63 MMHG | OXYGEN SATURATION: 96 % | RESPIRATION RATE: 20 BRPM | SYSTOLIC BLOOD PRESSURE: 133 MMHG | HEIGHT: 64.5 IN | BODY MASS INDEX: 34.57 KG/M2

## 2018-12-11 DIAGNOSIS — Z20.89 EXPOSURE TO PNEUMONIA: ICD-10-CM

## 2018-12-11 DIAGNOSIS — J44.1 COPD EXACERBATION (HCC): Primary | ICD-10-CM

## 2018-12-11 PROCEDURE — 71046 X-RAY EXAM CHEST 2 VIEWS: CPT | Performed by: FAMILY MEDICINE

## 2018-12-11 PROCEDURE — 99204 OFFICE O/P NEW MOD 45 MIN: CPT

## 2018-12-11 PROCEDURE — 99214 OFFICE O/P EST MOD 30 MIN: CPT

## 2018-12-11 PROCEDURE — 94640 AIRWAY INHALATION TREATMENT: CPT

## 2018-12-11 RX ORDER — PREDNISONE 20 MG/1
TABLET ORAL
Qty: 10 TABLET | Refills: 0 | Status: SHIPPED | OUTPATIENT
Start: 2018-12-11 | End: 2019-01-02

## 2018-12-11 RX ORDER — ALBUTEROL SULFATE 90 UG/1
2 AEROSOL, METERED RESPIRATORY (INHALATION) EVERY 4 HOURS PRN
Qty: 1 INHALER | Refills: 0 | Status: SHIPPED | OUTPATIENT
Start: 2018-12-11

## 2018-12-11 RX ORDER — IPRATROPIUM BROMIDE AND ALBUTEROL SULFATE 2.5; .5 MG/3ML; MG/3ML
3 SOLUTION RESPIRATORY (INHALATION) ONCE
Status: COMPLETED | OUTPATIENT
Start: 2018-12-11 | End: 2018-12-11

## 2018-12-11 RX ORDER — CEFUROXIME AXETIL 250 MG/1
250 TABLET ORAL 2 TIMES DAILY
Qty: 20 TABLET | Refills: 0 | Status: SHIPPED | OUTPATIENT
Start: 2018-12-11 | End: 2019-01-02

## 2018-12-11 NOTE — ED INITIAL ASSESSMENT (HPI)
Patient presents with cc of cough sometime productive of green sputum for last couple of nights w/ wheezing and requiring nebtreatments during the night and sitting up in lazyboy chair. Keturah [place] +Chills Afebrile. Did get flu/pneumonia vaccine.h/o pneumonia,sarcoidos

## 2018-12-12 NOTE — ED PROVIDER NOTES
Patient Seen in: [facility] END    History   Patient presents with:  Cough    Stated Complaint: cough/lung pain     HPI    This 64-year-old female with a history for COPD, sarcoidosis and pneumonia presents the office with a 3-day h 133/63   Pulse 91   Temp 98.4 °F (36.9 °C) (Temporal)   Resp 20   Ht 163.8 cm (5' 4.5\")   Wt 93 kg   SpO2 96%   BMI 34.64 kg/m²         Physical Exam    General: WH/overweight/WD, in no respiratory distress, A and O times 3  HEAD: Normocephalic, atraumati sarcoidosis. Dictated by: Kem Araiza MD on 12/11/2018 at 18:32     Approved by: Kem Araiza MD                  [facility]     Patient is given DuoNeb treatment while in the office.   The lung exam shows resolution of the the patient states she no alessia breakfast and dinner for the next 10 days. While you are on the antibiotics, eat yogurt or take probiotics to help replenish the good bacteria in your intestines. Start the prednisone tomorrow with lunch.   Take the prednisone 20 mg tablets 2 tablets once

## 2018-12-17 ENCOUNTER — HOSPITAL ENCOUNTER (OUTPATIENT)
Dept: CT IMAGING | Facility: HOSPITAL | Age: 73
Discharge: HOME | End: 2018-12-17
Attending: INTERNAL MEDICINE
Payer: MEDICARE

## 2018-12-17 DIAGNOSIS — R06.00 DYSPNEA: ICD-10-CM

## 2018-12-17 PROCEDURE — 71275 CT ANGIOGRAPHY CHEST: CPT | Performed by: INTERNAL MEDICINE

## 2018-12-17 PROCEDURE — 82565 ASSAY OF CREATININE: CPT

## 2019-01-02 ENCOUNTER — APPOINTMENT (OUTPATIENT)
Dept: GENERAL RADIOLOGY | Facility: HOSPITAL | Age: 74
DRG: 190 | End: 2019-01-02
Attending: EMERGENCY MEDICINE
Payer: MEDICARE

## 2019-01-02 ENCOUNTER — HOSPITAL ENCOUNTER (INPATIENT)
Facility: HOSPITAL | Age: 74
LOS: 5 days | Discharge: HOME HEALTH | DRG: 190 | End: 2019-01-07
Attending: EMERGENCY MEDICINE | Admitting: SPECIALIST
Payer: MEDICARE

## 2019-01-02 DIAGNOSIS — J44.1 ACUTE EXACERBATION OF CHRONIC OBSTRUCTIVE PULMONARY DISEASE (COPD) (HCC): Primary | ICD-10-CM

## 2019-01-02 DIAGNOSIS — J18.9 COMMUNITY ACQUIRED PNEUMONIA, UNSPECIFIED LATERALITY: ICD-10-CM

## 2019-01-02 LAB
ALBUMIN SERPL-MCNC: 3.4 G/DL (ref 3.1–4.5)
ALBUMIN/GLOB SERPL: 0.7 {RATIO} (ref 1–2)
ALP LIVER SERPL-CCNC: 121 U/L (ref 55–142)
ALT SERPL-CCNC: 23 U/L (ref 14–54)
ANION GAP SERPL CALC-SCNC: 6 MMOL/L (ref 0–18)
AST SERPL-CCNC: 37 U/L (ref 15–41)
ATRIAL RATE: 107 BPM
BASOPHILS # BLD AUTO: 0.01 X10(3) UL (ref 0–0.1)
BASOPHILS NFR BLD AUTO: 0.1 %
BILIRUB SERPL-MCNC: 0.5 MG/DL (ref 0.1–2)
BUN BLD-MCNC: 9 MG/DL (ref 8–20)
BUN/CREAT SERPL: 10.1 (ref 10–20)
CALCIUM BLD-MCNC: 9.2 MG/DL (ref 8.3–10.3)
CHLORIDE SERPL-SCNC: 106 MMOL/L (ref 101–111)
CO2 SERPL-SCNC: 28 MMOL/L (ref 22–32)
CREAT BLD-MCNC: 0.89 MG/DL (ref 0.55–1.02)
EOSINOPHIL # BLD AUTO: 0.02 X10(3) UL (ref 0–0.3)
EOSINOPHIL NFR BLD AUTO: 0.3 %
ERYTHROCYTE [DISTWIDTH] IN BLOOD BY AUTOMATED COUNT: 13.2 % (ref 11.5–16)
GLOBULIN PLAS-MCNC: 4.8 G/DL (ref 2.8–4.4)
GLUCOSE BLD-MCNC: 125 MG/DL (ref 70–99)
HCT VFR BLD AUTO: 36 % (ref 34–50)
HGB BLD-MCNC: 11.5 G/DL (ref 12–16)
IMMATURE GRANULOCYTE COUNT: 0.02 X10(3) UL (ref 0–1)
IMMATURE GRANULOCYTE RATIO %: 0.3 %
LYMPHOCYTES # BLD AUTO: 2.36 X10(3) UL (ref 0.9–4)
LYMPHOCYTES NFR BLD AUTO: 31.5 %
M PROTEIN MFR SERPL ELPH: 8.2 G/DL (ref 6.4–8.2)
MCH RBC QN AUTO: 28 PG (ref 27–33.2)
MCHC RBC AUTO-ENTMCNC: 31.9 G/DL (ref 31–37)
MCV RBC AUTO: 87.8 FL (ref 81–100)
MONOCYTES # BLD AUTO: 0.9 X10(3) UL (ref 0.1–1)
MONOCYTES NFR BLD AUTO: 12 %
NEUTROPHIL ABS PRELIM: 4.18 X10 (3) UL (ref 1.3–6.7)
NEUTROPHILS # BLD AUTO: 4.18 X10(3) UL (ref 1.3–6.7)
NEUTROPHILS NFR BLD AUTO: 55.8 %
OSMOLALITY SERPL CALC.SUM OF ELEC: 290 MOSM/KG (ref 275–295)
P AXIS: 57 DEGREES
P-R INTERVAL: 158 MS
PLATELET # BLD AUTO: 160 10(3)UL (ref 150–450)
POTASSIUM SERPL-SCNC: 4 MMOL/L (ref 3.6–5.1)
PRO-BETA NATRIURETIC PEPTIDE: 64 PG/ML (ref ?–125)
Q-T INTERVAL: 336 MS
QRS DURATION: 88 MS
QTC CALCULATION (BEZET): 448 MS
R AXIS: 39 DEGREES
RBC # BLD AUTO: 4.1 X10(6)UL (ref 3.8–5.1)
RED CELL DISTRIBUTION WIDTH-SD: 42.2 FL (ref 35.1–46.3)
SODIUM SERPL-SCNC: 140 MMOL/L (ref 136–144)
T AXIS: 51 DEGREES
TROPONIN I SERPL-MCNC: <0.046 NG/ML (ref ?–0.05)
VENTRICULAR RATE: 107 BPM
WBC # BLD AUTO: 7.5 X10(3) UL (ref 4–13)

## 2019-01-02 PROCEDURE — 87798 DETECT AGENT NOS DNA AMP: CPT | Performed by: INTERNAL MEDICINE

## 2019-01-02 PROCEDURE — 87502 INFLUENZA DNA AMP PROBE: CPT | Performed by: INTERNAL MEDICINE

## 2019-01-02 PROCEDURE — 80053 COMPREHEN METABOLIC PANEL: CPT | Performed by: EMERGENCY MEDICINE

## 2019-01-02 PROCEDURE — 87205 SMEAR GRAM STAIN: CPT | Performed by: EMERGENCY MEDICINE

## 2019-01-02 PROCEDURE — 83880 ASSAY OF NATRIURETIC PEPTIDE: CPT | Performed by: EMERGENCY MEDICINE

## 2019-01-02 PROCEDURE — 87999 UNLISTED MICROBIOLOGY PX: CPT

## 2019-01-02 PROCEDURE — 87070 CULTURE OTHR SPECIMN AEROBIC: CPT | Performed by: EMERGENCY MEDICINE

## 2019-01-02 PROCEDURE — 94640 AIRWAY INHALATION TREATMENT: CPT

## 2019-01-02 PROCEDURE — 99285 EMERGENCY DEPT VISIT HI MDM: CPT | Performed by: EMERGENCY MEDICINE

## 2019-01-02 PROCEDURE — 96365 THER/PROPH/DIAG IV INF INIT: CPT | Performed by: EMERGENCY MEDICINE

## 2019-01-02 PROCEDURE — 93010 ELECTROCARDIOGRAM REPORT: CPT | Performed by: EMERGENCY MEDICINE

## 2019-01-02 PROCEDURE — 94644 CONT INHLJ TX 1ST HOUR: CPT

## 2019-01-02 PROCEDURE — 71045 X-RAY EXAM CHEST 1 VIEW: CPT | Performed by: EMERGENCY MEDICINE

## 2019-01-02 PROCEDURE — 85025 COMPLETE CBC W/AUTO DIFF WBC: CPT | Performed by: EMERGENCY MEDICINE

## 2019-01-02 PROCEDURE — 93005 ELECTROCARDIOGRAM TRACING: CPT

## 2019-01-02 PROCEDURE — 87040 BLOOD CULTURE FOR BACTERIA: CPT | Performed by: EMERGENCY MEDICINE

## 2019-01-02 PROCEDURE — 84484 ASSAY OF TROPONIN QUANT: CPT | Performed by: EMERGENCY MEDICINE

## 2019-01-02 PROCEDURE — 96375 TX/PRO/DX INJ NEW DRUG ADDON: CPT | Performed by: EMERGENCY MEDICINE

## 2019-01-02 RX ORDER — ONDANSETRON 2 MG/ML
4 INJECTION INTRAMUSCULAR; INTRAVENOUS EVERY 6 HOURS PRN
Status: DISCONTINUED | OUTPATIENT
Start: 2019-01-02 | End: 2019-01-07

## 2019-01-02 RX ORDER — METOCLOPRAMIDE HYDROCHLORIDE 5 MG/ML
10 INJECTION INTRAMUSCULAR; INTRAVENOUS EVERY 8 HOURS PRN
Status: DISCONTINUED | OUTPATIENT
Start: 2019-01-02 | End: 2019-01-04 | Stop reason: DRUGHIGH

## 2019-01-02 RX ORDER — ATORVASTATIN CALCIUM 10 MG/1
10 TABLET, FILM COATED ORAL NIGHTLY
Status: DISCONTINUED | OUTPATIENT
Start: 2019-01-02 | End: 2019-01-07

## 2019-01-02 RX ORDER — CEFDINIR 300 MG/1
300 CAPSULE ORAL 2 TIMES DAILY
Status: ON HOLD | COMMUNITY
End: 2019-01-07

## 2019-01-02 RX ORDER — HYDROCODONE BITARTRATE AND ACETAMINOPHEN 10; 325 MG/1; MG/1
1 TABLET ORAL EVERY 6 HOURS PRN
Refills: 0 | COMMUNITY
Start: 2018-12-30

## 2019-01-02 RX ORDER — METHYLPREDNISOLONE SODIUM SUCCINATE 125 MG/2ML
60 INJECTION, POWDER, LYOPHILIZED, FOR SOLUTION INTRAMUSCULAR; INTRAVENOUS EVERY 6 HOURS
Status: DISCONTINUED | OUTPATIENT
Start: 2019-01-02 | End: 2019-01-04

## 2019-01-02 RX ORDER — LOSARTAN POTASSIUM AND HYDROCHLOROTHIAZIDE 25; 100 MG/1; MG/1
1 TABLET ORAL DAILY
COMMUNITY

## 2019-01-02 RX ORDER — ALBUTEROL SULFATE 2.5 MG/3ML
1.25 SOLUTION RESPIRATORY (INHALATION) EVERY 6 HOURS PRN
Status: DISCONTINUED | OUTPATIENT
Start: 2019-01-02 | End: 2019-01-07

## 2019-01-02 RX ORDER — IPRATROPIUM BROMIDE AND ALBUTEROL SULFATE 2.5; .5 MG/3ML; MG/3ML
3 SOLUTION RESPIRATORY (INHALATION) ONCE
Status: COMPLETED | OUTPATIENT
Start: 2019-01-02 | End: 2019-01-02

## 2019-01-02 RX ORDER — METHYLPREDNISOLONE SODIUM SUCCINATE 125 MG/2ML
125 INJECTION, POWDER, LYOPHILIZED, FOR SOLUTION INTRAMUSCULAR; INTRAVENOUS ONCE
Status: COMPLETED | OUTPATIENT
Start: 2019-01-02 | End: 2019-01-02

## 2019-01-02 RX ORDER — FLUTICASONE PROPIONATE AND SALMETEROL 250; 50 UG/1; UG/1
1 POWDER RESPIRATORY (INHALATION) EVERY 12 HOURS SCHEDULED
Status: ON HOLD | COMMUNITY
End: 2020-01-10

## 2019-01-02 RX ORDER — ATORVASTATIN CALCIUM 10 MG/1
10 TABLET, FILM COATED ORAL NIGHTLY
COMMUNITY

## 2019-01-02 RX ORDER — ZOLPIDEM TARTRATE 5 MG/1
5 TABLET ORAL NIGHTLY PRN
Status: DISCONTINUED | OUTPATIENT
Start: 2019-01-02 | End: 2019-01-05

## 2019-01-02 RX ORDER — HYDROCODONE BITARTRATE AND ACETAMINOPHEN 10; 325 MG/1; MG/1
1 TABLET ORAL EVERY 6 HOURS PRN
Status: DISCONTINUED | OUTPATIENT
Start: 2019-01-02 | End: 2019-01-07

## 2019-01-02 RX ORDER — SODIUM CHLORIDE 9 MG/ML
INJECTION, SOLUTION INTRAVENOUS CONTINUOUS
Status: DISCONTINUED | OUTPATIENT
Start: 2019-01-02 | End: 2019-01-07

## 2019-01-02 RX ORDER — ENOXAPARIN SODIUM 100 MG/ML
40 INJECTION SUBCUTANEOUS DAILY
Status: DISCONTINUED | OUTPATIENT
Start: 2019-01-02 | End: 2019-01-07

## 2019-01-02 RX ORDER — IPRATROPIUM BROMIDE AND ALBUTEROL SULFATE 2.5; .5 MG/3ML; MG/3ML
3 SOLUTION RESPIRATORY (INHALATION)
Status: DISCONTINUED | OUTPATIENT
Start: 2019-01-02 | End: 2019-01-03

## 2019-01-02 RX ORDER — PREDNISONE 10 MG/1
10 TABLET ORAL SEE ADMIN INSTRUCTIONS
Status: ON HOLD | COMMUNITY
Start: 2019-01-31 | End: 2019-01-07

## 2019-01-02 RX ORDER — PANTOPRAZOLE SODIUM 40 MG/1
40 TABLET, DELAYED RELEASE ORAL
Status: DISCONTINUED | OUTPATIENT
Start: 2019-01-02 | End: 2019-01-07

## 2019-01-02 RX ORDER — METOCLOPRAMIDE 10 MG/1
10 TABLET ORAL
Status: DISCONTINUED | OUTPATIENT
Start: 2019-01-02 | End: 2019-01-07

## 2019-01-02 RX ORDER — METOCLOPRAMIDE 10 MG/1
10 TABLET ORAL
Status: ON HOLD | COMMUNITY
End: 2021-01-12

## 2019-01-02 NOTE — PAYOR COMM NOTE
--------------  ADMISSION REVIEW     Payor: David Varghese  Subscriber #:  BJORN MIRANDA [employer]  Authorization Number: [id number]    Admit date: 1/2/19  Admit time: 46       Admitting Physician: Lola Chiang MD  Attending Physician:   Lola Chiang MD  P Abnormality         Status                     ---------                               -----------         ------                     CBC W/ DIFFERENTIAL[[id number]]          Abnormal            Final result                 Please view results for th 3 mL     Date Action Dose Route User    1/2/2019 0912 Given 3 mL Nebulization Shahnaz Castillo, RN      MethylPREDNISolone Sodium Succ (Solu-MEDROL) injection 125 mg     Date Action Dose Route User    1/2/2019 0917 Given 125 mg Intravenous Levmamie Mac

## 2019-01-02 NOTE — PROGRESS NOTES
01/02/19 1723   Clinical Encounter Type   Visited With Patient   Routine Visit Introduction   Continue Visiting (If requested)   Patient's Supportive Strategies/Resources ( honored patient by acknowledging her spiritual resources.)   Patient Spi

## 2019-01-02 NOTE — ED NOTES
Pt going to room 3625, reported to [facility]. Pt and her daughter aware of admission both verbalizing understanding.

## 2019-01-02 NOTE — ED PROVIDER NOTES
Patient Seen in: [facility] Emergency Department    History   Patient presents with:  Cough/URI    Stated Complaint: hx copd multiple complaints     HPI    72-year-old woman with history of COPD here with multiple complaints.   She has been coughing BMI 35.78 kg/m²         Physical Exam   Constitutional: She is oriented to person, place, and time. She appears well-developed and well-nourished. Non-toxic appearance. No distress. HENT:   Head: Normocephalic and atraumatic.    Eyes: Conjunctivae are no FLU EXPANDED     EKG    Rate, intervals and axes as noted on EKG Report.   Rate: 107  Rhythm: Sinus Rhythm  Reading: Sinus tachycardia         XR CHEST AP PORTABLE  (CPT=71045)   Final Result    PROCEDURE:  XR CHEST AP PORTABLE  (CPT=71045)         TECHNIQU laterality    Disposition:  Admit  1/2/2019 10:14 am    Follow-up:  No follow-up provider specified.       Medications Prescribed:  Current Discharge Medication List        Present on Admission  Date Reviewed: 8/2/2018          ICD-10-CM Noted POA    * (Geri

## 2019-01-02 NOTE — CONSULTS
[facility]  Report of Consultation    [employer] Patient Status:  Inpatient    1945 MRN [id number]   [facility] 3NE-A Attending Ugo Lee MD   Hosp Day # 0 PCP Guilherme Ramos MD     Reason for Consultation:  D sarcoidosis   • Congestive heart disease (HCC)    • COPD (chronic obstructive pulmonary disease) (HCC)    • Essential hypertension    • High blood pressure    • Osteoarthritis    • Pneumonia    • Pneumonia due to organism    • Sarcoidosis    • Shortness Q6H PRN  •  Metoclopramide HCl (REGLAN) injection 10 mg, 10 mg, Intravenous, Q8H PRN    Review of Systems:   A comprehensive 10 point review of systems was completed. Pertinent positives and negatives are noted above in the the HPI.    Physical Exam:   Gene Value Date    INR 0.93 05/01/2018        No results for input(s): TSH in the last 72 hours. No results for input(s): ABGPHT, UKPGBV8W, NLWRT9B, ABGHCO3, ABGBE, TEMP, IMANI, SITE, DEV, THGB in the last 168 hours.     Invalid input(s): JJT39XUX, CHOB

## 2019-01-02 NOTE — ED INITIAL ASSESSMENT (HPI)
Pt states she's been sick with allyssa/cough x2 weeks. Went to urgent care and diagnosed with bronchitis, pt states she's getting worse.

## 2019-01-02 NOTE — PROGRESS NOTES
NURSING ADMISSION NOTE      Patient admitted via Cart  Oriented to room. Safety precautions initiated. Bed in low position. Call light in reach. Droplet isolation initiated for r/o flu.   Patient states she is a DNR - pt is A&Ox4 and able to make al

## 2019-01-03 ENCOUNTER — APPOINTMENT (OUTPATIENT)
Dept: CV DIAGNOSTICS | Facility: HOSPITAL | Age: 74
DRG: 190 | End: 2019-01-03
Attending: INTERNAL MEDICINE
Payer: MEDICARE

## 2019-01-03 LAB
ANION GAP SERPL CALC-SCNC: 3 MMOL/L (ref 0–18)
BASOPHILS # BLD AUTO: 0.01 X10(3) UL (ref 0–0.1)
BASOPHILS NFR BLD AUTO: 0.1 %
BILIRUB UR QL STRIP.AUTO: NEGATIVE
BUN BLD-MCNC: 13 MG/DL (ref 8–20)
BUN/CREAT SERPL: 16.9 (ref 10–20)
CALCIUM BLD-MCNC: 8.9 MG/DL (ref 8.3–10.3)
CHLORIDE SERPL-SCNC: 107 MMOL/L (ref 101–111)
CLARITY UR REFRACT.AUTO: CLEAR
CO2 SERPL-SCNC: 30 MMOL/L (ref 22–32)
COLOR UR AUTO: YELLOW
CREAT BLD-MCNC: 0.77 MG/DL (ref 0.55–1.02)
EOSINOPHIL # BLD AUTO: 0 X10(3) UL (ref 0–0.3)
EOSINOPHIL NFR BLD AUTO: 0 %
ERYTHROCYTE [DISTWIDTH] IN BLOOD BY AUTOMATED COUNT: 13.2 % (ref 11.5–16)
GLUCOSE BLD-MCNC: 135 MG/DL (ref 70–99)
GLUCOSE UR STRIP.AUTO-MCNC: NEGATIVE MG/DL
HCT VFR BLD AUTO: 35.1 % (ref 34–50)
HGB BLD-MCNC: 11.3 G/DL (ref 12–16)
IMMATURE GRANULOCYTE COUNT: 0.03 X10(3) UL (ref 0–1)
IMMATURE GRANULOCYTE RATIO %: 0.3 %
KETONES UR STRIP.AUTO-MCNC: NEGATIVE MG/DL
LEUKOCYTE ESTERASE UR QL STRIP.AUTO: NEGATIVE
LYMPHOCYTES # BLD AUTO: 2.36 X10(3) UL (ref 0.9–4)
LYMPHOCYTES NFR BLD AUTO: 23.5 %
MCH RBC QN AUTO: 28.1 PG (ref 27–33.2)
MCHC RBC AUTO-ENTMCNC: 32.2 G/DL (ref 31–37)
MCV RBC AUTO: 87.3 FL (ref 81–100)
MONOCYTES # BLD AUTO: 0.66 X10(3) UL (ref 0.1–1)
MONOCYTES NFR BLD AUTO: 6.6 %
NEUTROPHIL ABS PRELIM: 7 X10 (3) UL (ref 1.3–6.7)
NEUTROPHILS # BLD AUTO: 7 X10(3) UL (ref 1.3–6.7)
NEUTROPHILS NFR BLD AUTO: 69.5 %
NITRITE UR QL STRIP.AUTO: NEGATIVE
OSMOLALITY SERPL CALC.SUM OF ELEC: 292 MOSM/KG (ref 275–295)
PH UR STRIP.AUTO: 6 [PH] (ref 4.5–8)
PLATELET # BLD AUTO: 258 10(3)UL (ref 150–450)
POTASSIUM SERPL-SCNC: 3.9 MMOL/L (ref 3.6–5.1)
PROT UR STRIP.AUTO-MCNC: NEGATIVE MG/DL
RBC # BLD AUTO: 4.02 X10(6)UL (ref 3.8–5.1)
RBC UR QL AUTO: NEGATIVE
RED CELL DISTRIBUTION WIDTH-SD: 42.4 FL (ref 35.1–46.3)
SODIUM SERPL-SCNC: 140 MMOL/L (ref 136–144)
SP GR UR STRIP.AUTO: 1.02 (ref 1–1.03)
UROBILINOGEN UR STRIP.AUTO-MCNC: 1 MG/DL
WBC # BLD AUTO: 10.1 X10(3) UL (ref 4–13)

## 2019-01-03 PROCEDURE — 81003 URINALYSIS AUTO W/O SCOPE: CPT | Performed by: SPECIALIST

## 2019-01-03 PROCEDURE — 80048 BASIC METABOLIC PNL TOTAL CA: CPT | Performed by: SPECIALIST

## 2019-01-03 PROCEDURE — 94640 AIRWAY INHALATION TREATMENT: CPT

## 2019-01-03 PROCEDURE — 93306 TTE W/DOPPLER COMPLETE: CPT | Performed by: INTERNAL MEDICINE

## 2019-01-03 PROCEDURE — 85025 COMPLETE CBC W/AUTO DIFF WBC: CPT | Performed by: SPECIALIST

## 2019-01-03 RX ORDER — IPRATROPIUM BROMIDE AND ALBUTEROL SULFATE 2.5; .5 MG/3ML; MG/3ML
3 SOLUTION RESPIRATORY (INHALATION)
Status: DISCONTINUED | OUTPATIENT
Start: 2019-01-03 | End: 2019-01-07

## 2019-01-03 RX ORDER — DOCUSATE SODIUM 100 MG/1
100 CAPSULE, LIQUID FILLED ORAL DAILY
Status: DISCONTINUED | OUTPATIENT
Start: 2019-01-03 | End: 2019-01-07

## 2019-01-03 NOTE — PAYOR COMM NOTE
--------------  CONTINUED STAY REVIEW    Payor: No Tucker  Subscriber #:  BJORN MIRANDA CHATO  Authorization Number: [id number]    Admit date: 1/2/19  Admit time: 46    Admitting Physician: Gavin Flores MD  Attending Physician:   Gavin Flores MD Dose Route User    1/3/2019 1253 Given 1 tablet Oral Thomas Nagel RN    1/3/2019 0704 Given 1 tablet Oral Demi Hartman RN    1/3/2019 0047 Given 1 tablet Oral Demi Hartman, RN    1/2/2019 1808 Given 1 tablet Oral Thomas Nagel RN      ipratropium Lovenox       PLEASE FAX DAYS CERTIFIED AND NEXT REVIEW DATE

## 2019-01-03 NOTE — PLAN OF CARE
A/O x 4. Room air. .  Wheezing noted that has greatly improved after nebs and IV steroids  C/O ear pain, back pain- Norco PO PRN  Lovenox for DVT prophylaxis   Tele NSR  Up standby assist  Will continue to monitor

## 2019-01-03 NOTE — H&P
[facility]    PATIENT'S NAME: Mike Delgado   ATTENDING PHYSICIAN: Estela Pennington M.D.    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  4.   Sarcoidosis. 5.   Bronchiectasis. 6.   Dyslipidemia. 7.   Hypertension. 8.   Gastroesophageal reflux disease. PLAN:  IV steroids. Nebulizers. IV antibiotic. Pulmonary consult.   DVT prophy

## 2019-01-03 NOTE — PLAN OF CARE
METABOLIC/FLUID AND ELECTROLYTES - ADULT    • Electrolytes maintained within normal limits Progressing        MUSCULOSKELETAL - ADULT    • Return mobility to safest level of function Progressing        RESPIRATORY - ADULT    • Achieves optimal ventilation

## 2019-01-03 NOTE — PROGRESS NOTES
[facility]  Progress Note    Georgina Keto Patient Status:  Inpatient    1945 MRN [id number]   [facility] 3NE-A Attending Jj Quinones MD   Hosp Day # 1 PCP Thu Lyn MD     Subjective:  Georgina Keto is --        No results for input(s): MG in the last 168 hours. No results found for: PHOS, PHOSPHORUS     No results for input(s): PT, INR, PTT in the last 168 hours.     No results for input(s): ABGPHT, TKGCII1T, KOVLZ1X, ABGHCO3, ABGBE, TEMP, IMANI, SIT

## 2019-01-04 PROBLEM — J21.0 RSV (ACUTE BRONCHIOLITIS DUE TO RESPIRATORY SYNCYTIAL VIRUS): Status: ACTIVE | Noted: 2019-01-04

## 2019-01-04 LAB — PROCALCITONIN SERPL-MCNC: 0.12 NG/ML

## 2019-01-04 PROCEDURE — 94640 AIRWAY INHALATION TREATMENT: CPT

## 2019-01-04 PROCEDURE — 84145 PROCALCITONIN (PCT): CPT | Performed by: INTERNAL MEDICINE

## 2019-01-04 PROCEDURE — 94664 DEMO&/EVAL PT USE INHALER: CPT

## 2019-01-04 RX ORDER — METHYLPREDNISOLONE SODIUM SUCCINATE 40 MG/ML
40 INJECTION, POWDER, LYOPHILIZED, FOR SOLUTION INTRAMUSCULAR; INTRAVENOUS EVERY 8 HOURS
Status: COMPLETED | OUTPATIENT
Start: 2019-01-04 | End: 2019-01-06

## 2019-01-04 NOTE — CM/SW NOTE
MSW met with patient, provided and reviewed IM. Pt is home with her dtr and SAL. Pt's spouse in in hops ice at a SNF. Pt has hx of COPD, does not feel she will need HHC at d/c.

## 2019-01-04 NOTE — PLAN OF CARE
MUSCULOSKELETAL - ADULT    • Return mobility to safest level of function Progressing        RESPIRATORY - ADULT    • Achieves optimal ventilation and oxygenation Progressing        PT A/O, DESATED WITH SLEEP, 2L NC WHEN SLEEPING, EXP WHEEZE, RHONCHI, SCHED

## 2019-01-04 NOTE — PAYOR COMM NOTE
--------------  CONTINUED STAY REVIEW    Payor: Nelly Mario  Subscriber #:  DECKAYLEE MIRANDA CHATO  Authorization Number: [id number]    Admit date: 1/2/19  Admit time: 46    Admitting Physician: Lisa Lewis MD  Attending Physician:   Lisa Lewis MD puff Inhalation Rogelio Lang RN    1/3/2019 1038 Given 1 puff Inhalation Haris [place], [facility]      HYDROcodone-acetaminophen (NORCO)  MG per tab 1 tablet     Date Action Dose Route User    1/4/2019 0615 Given 1 tablet Oral Atif Bailey RN Plan: 1/4  Continue present management,  Sl better cont iv steroids , nebs and empric abx   See tests ordered,  Available and radiology reviewed  Discussed with nursing on floor  dvt prophylaxis reviewed  PT and/or OT    PLEASE FAX DAYS CERTIFIED A

## 2019-01-04 NOTE — PROGRESS NOTES
[facility]  Progress Note    Lupe Lopez Patient Status:  Inpatient    1945 MRN [id number]   [facility] 3NE-A Attending Lisa Lewis MD   Hosp Day # 2 PCP Reyes Ryder MD         SUBJECTIVE:  Subjective:  Wes Turcios times per day   • docusate sodium  100 mg Oral Daily   • atorvastatin  10 mg Oral Nightly   • Estrogens Conjugated  0.3 mg Oral Daily   • Fluticasone Furoate-Vilanterol  1 puff Inhalation Daily   • losartan-hydrochlorothiazide (HYZAAR 100/12. 5) combination

## 2019-01-04 NOTE — PROGRESS NOTES
[facility]  Progress Note    Henny Augustin Patient Status:  Inpatient    1945 MRN [id number]   [facility] 3NE-A Attending Abel Bray MD   Hosp Day # 2 PCP Amparo Hampton MD     Subjective:  Henny Augustin is 01/02/19   0849  01/03/19   0603   WBC  7.5  10.1   HGB  11.5*  11.3*   HCT  36.0  35.1   PLT  160.0  258.0       Recent Labs   Lab  01/02/19   0849  01/03/19   0603   GLU  125*  135*   BUN  9  13   CREATSERUM  0.89  0.77   GFRAA  75  89   GFRNAA  65  77

## 2019-01-05 PROCEDURE — 94664 DEMO&/EVAL PT USE INHALER: CPT

## 2019-01-05 PROCEDURE — 94640 AIRWAY INHALATION TREATMENT: CPT

## 2019-01-05 NOTE — PROGRESS NOTES
[facility]  Progress Note    Conrado Darnell Patient Status:  Inpatient    1945 MRN [id number]   [facility] 3NE-A Attending Juvencio Grant MD   Hosp Day # 3 PCP Shun Diop MD     Subjective:  Conrado Darnell is Recent Labs   Lab  01/02/19   0849  01/03/19   0603   GLU  125*  135*   BUN  9  13   CREATSERUM  0.89  0.77   GFRAA  75  89   GFRNAA  65  77   CA  9.2  8.9   NA  140  140   K  4.0  3.9   CL  106  107   CO2  28.0  30.0     No results for input(s): ABGP

## 2019-01-05 NOTE — PROGRESS NOTES
[facility]  Progress Note    Prema Dawkins Patient Status:  Inpatient    1945 MRN [id number]   [facility] 3NE-A Attending Senait Hyatt MD   Hosp Day # 3 PCP Estela Pennington MD         SUBJECTIVE:  Subjective:  Rohith Choudhary MethylPREDNISolone Sodium Succ  40 mg Intravenous Q8H   • ipratropium-albuterol  3 mL Nebulization 6 times per day   • docusate sodium  100 mg Oral Daily   • atorvastatin  10 mg Oral Nightly   • Estrogens Conjugated  0.3 mg Oral Daily   • Fluticasone Furoa

## 2019-01-05 NOTE — PLAN OF CARE
MUSCULOSKELETAL - ADULT    • Return mobility to safest level of function Progressing        PAIN - ADULT    • Verbalizes/displays adequate comfort level or patient's stated pain goal Progressing        RESPIRATORY - ADULT    • Achieves optimal ventilation

## 2019-01-05 NOTE — PLAN OF CARE
AOx4  VSS, on RA  O2 prn  Scheduled nebs  Lovenox  Q6 norco for pain  IV solumedrol  Droplet isolation maintained  IVF- Leno@[employer]    METABOLIC/FLUID AND ELECTROLYTES - ADULT    • Electrolytes maintained within normal limits Progressing        MUSCULOSKE

## 2019-01-06 ENCOUNTER — APPOINTMENT (OUTPATIENT)
Dept: GENERAL RADIOLOGY | Facility: HOSPITAL | Age: 74
DRG: 190 | End: 2019-01-06
Attending: INTERNAL MEDICINE
Payer: MEDICARE

## 2019-01-06 LAB
BASOPHILS # BLD AUTO: 0.01 X10(3) UL (ref 0–0.1)
BASOPHILS NFR BLD AUTO: 0.1 %
BUN BLD-MCNC: 19 MG/DL (ref 8–20)
CALCIUM BLD-MCNC: 8.7 MG/DL (ref 8.3–10.3)
CHLORIDE SERPL-SCNC: 105 MMOL/L (ref 101–111)
CO2 SERPL-SCNC: 29 MMOL/L (ref 22–32)
CREAT BLD-MCNC: 0.92 MG/DL (ref 0.55–1.02)
EOSINOPHIL # BLD AUTO: 0 X10(3) UL (ref 0–0.3)
EOSINOPHIL NFR BLD AUTO: 0 %
ERYTHROCYTE [DISTWIDTH] IN BLOOD BY AUTOMATED COUNT: 12.9 % (ref 11.5–16)
GLUCOSE BLD-MCNC: 130 MG/DL (ref 70–99)
HAV IGM SER QL: 2.2 MG/DL (ref 1.8–2.5)
HCT VFR BLD AUTO: 35.9 % (ref 34–50)
HGB BLD-MCNC: 11.3 G/DL (ref 12–16)
IMMATURE GRANULOCYTE COUNT: 0.2 X10(3) UL (ref 0–1)
IMMATURE GRANULOCYTE RATIO %: 1.6 %
LYMPHOCYTES # BLD AUTO: 1.72 X10(3) UL (ref 0.9–4)
LYMPHOCYTES NFR BLD AUTO: 13.6 %
MCH RBC QN AUTO: 27.8 PG (ref 27–33.2)
MCHC RBC AUTO-ENTMCNC: 31.5 G/DL (ref 31–37)
MCV RBC AUTO: 88.2 FL (ref 81–100)
MONOCYTES # BLD AUTO: 1.04 X10(3) UL (ref 0.1–1)
MONOCYTES NFR BLD AUTO: 8.2 %
NEUTROPHIL ABS PRELIM: 9.64 X10 (3) UL (ref 1.3–6.7)
NEUTROPHILS # BLD AUTO: 9.64 X10(3) UL (ref 1.3–6.7)
NEUTROPHILS NFR BLD AUTO: 76.5 %
PLATELET # BLD AUTO: 305 10(3)UL (ref 150–450)
POTASSIUM SERPL-SCNC: 3.2 MMOL/L (ref 3.6–5.1)
POTASSIUM SERPL-SCNC: 4.3 MMOL/L (ref 3.6–5.1)
RBC # BLD AUTO: 4.07 X10(6)UL (ref 3.8–5.1)
RED CELL DISTRIBUTION WIDTH-SD: 41.9 FL (ref 35.1–46.3)
SODIUM SERPL-SCNC: 142 MMOL/L (ref 136–144)
WBC # BLD AUTO: 12.6 X10(3) UL (ref 4–13)

## 2019-01-06 PROCEDURE — 83735 ASSAY OF MAGNESIUM: CPT | Performed by: INTERNAL MEDICINE

## 2019-01-06 PROCEDURE — 85025 COMPLETE CBC W/AUTO DIFF WBC: CPT | Performed by: SPECIALIST

## 2019-01-06 PROCEDURE — 71046 X-RAY EXAM CHEST 2 VIEWS: CPT | Performed by: INTERNAL MEDICINE

## 2019-01-06 PROCEDURE — 84132 ASSAY OF SERUM POTASSIUM: CPT | Performed by: SPECIALIST

## 2019-01-06 PROCEDURE — 94640 AIRWAY INHALATION TREATMENT: CPT

## 2019-01-06 PROCEDURE — 80048 BASIC METABOLIC PNL TOTAL CA: CPT | Performed by: INTERNAL MEDICINE

## 2019-01-06 RX ORDER — POTASSIUM CHLORIDE 20 MEQ/1
40 TABLET, EXTENDED RELEASE ORAL EVERY 4 HOURS
Status: COMPLETED | OUTPATIENT
Start: 2019-01-06 | End: 2019-01-06

## 2019-01-06 RX ORDER — PREDNISONE 20 MG/1
60 TABLET ORAL
Status: DISCONTINUED | OUTPATIENT
Start: 2019-01-07 | End: 2019-01-07

## 2019-01-06 NOTE — PROGRESS NOTES
[facility]  Progress Note    Kelsie Child Patient Status:  Inpatient    1945 MRN [id number]   [facility] 3NE-A Attending Edwin Vaca MD   Hosp Day # 4 PCP Leonardo Castro MD         SUBJECTIVE:  Subjective:  Kalen De La Cruz --   2.2   GLU  125*  135*  130*       Recent Labs   Lab  01/02/19   0849   ALT  23   AST  37   ALB  3.4       No results for input(s): PGLU in the last 168 hours.                 Meds:     • Potassium Chloride ER  40 mEq Oral Q4H   • MethylPREDNISolone Sod

## 2019-01-06 NOTE — PROGRESS NOTES
[facility]  Progress Note    Alpa Bernal Patient Status:  Inpatient    1945 MRN [id number]   [facility] 3NE-A Attending Tracy Barrientos MD   [facility] Day # 4 PCP Kayleen Tomas MD     Subjective:  Alpa Bernal is 35.1  35.9   MCV  87.8  87.3  88.2   MCH  28.0  28.1  27.8   MCHC  31.9  32.2  31.5   RDW  13.2  13.2  12.9   NEPRELIM  4.18  7.00*  9.64*   WBC  7.5  10.1  12.6   PLT  160.0  258.0  305.0     Recent Labs   Lab  01/02/19   0849  01/03/19   0603  01/06/19

## 2019-01-06 NOTE — PLAN OF CARE
METABOLIC/FLUID AND ELECTROLYTES - ADULT    • Electrolytes maintained within normal limits Progressing        MUSCULOSKELETAL - ADULT    • Return mobility to safest level of function Progressing        PAIN - ADULT    • Verbalizes/displays adequate comfort

## 2019-01-06 NOTE — PLAN OF CARE
AOx4  BP elevated, MD aware, prn meds given and trending down   on RA  O2 prn  C/o back and hip pain, PRN meds given  Scheduled nebs  Lovenox  IV solumedrol  Droplet isolation maintained for RSV  IVF- Tj@[employer]  Ambulates independently    METABOLIC/FLUID

## 2019-01-06 NOTE — PLAN OF CARE
AOx4  VSS, on RA  O2 prn  Scheduled nebs  Lovenox  IV solumedrol  Droplet isolation maintained  IVF- Sandra@[employer]  Chest x-ray in the am  Ambulates independently, has walked a few times in the halls today      METABOLIC/FLUID AND ELECTROLYTES - ADULT    • E

## 2019-01-07 VITALS
OXYGEN SATURATION: 98 % | TEMPERATURE: 98 F | HEART RATE: 88 BPM | HEIGHT: 63 IN | DIASTOLIC BLOOD PRESSURE: 78 MMHG | WEIGHT: 210.19 LBS | SYSTOLIC BLOOD PRESSURE: 155 MMHG | RESPIRATION RATE: 21 BRPM | BODY MASS INDEX: 37.24 KG/M2

## 2019-01-07 PROCEDURE — 94664 DEMO&/EVAL PT USE INHALER: CPT

## 2019-01-07 PROCEDURE — 94640 AIRWAY INHALATION TREATMENT: CPT

## 2019-01-07 RX ORDER — PREDNISONE 10 MG/1
TABLET ORAL
Qty: 52 TABLET | Refills: 0 | Status: SHIPPED | OUTPATIENT
Start: 2019-01-07 | End: 2019-01-07

## 2019-01-07 RX ORDER — CEFDINIR 300 MG/1
300 CAPSULE ORAL 2 TIMES DAILY
Qty: 6 CAPSULE | Refills: 0 | Status: SHIPPED | OUTPATIENT
Start: 2019-01-07 | End: 2019-01-10

## 2019-01-07 RX ORDER — PREDNISONE 10 MG/1
TABLET ORAL
Qty: 52 TABLET | Refills: 0 | Status: ON HOLD | OUTPATIENT
Start: 2019-01-07 | End: 2019-02-04

## 2019-01-07 NOTE — PLAN OF CARE
NURSING DISCHARGE NOTE    Discharged Home via Wheelchair. Accompanied by Support staff  Belongings Taken by patient/family. Patient given discharge paperwork and script. Verbalizes understanding.

## 2019-01-07 NOTE — PROGRESS NOTES
[facility]  Progress Note    [facility] Patient Status:  Inpatient    1945 MRN [id number]   [facility] 3NE-A Attending Amador Juarez MD   Hosp Day # 5 PCP Elsa Carter MD     Assessment:  1.  LRTI , possible CA-PNA [address] filed at 1/7/2019 1246  Gross per 24 hour   Intake 1102 ml   Output 1600 ml   Net -498 ml     Wt Readings from Last 6 Encounters:  01/06/19 : 210 lb 3.2 oz (95.3 kg)  12/11/18 : 205 lb (93 kg)  08/02/18 : 209 lb (94.8 kg)  05/02/18 : 202 lb (2.5 MG/3ML) 0.083% nebulizer solution 1.25 mg 1.25 mg Nebulization Q6H PRN   atorvastatin (LIPITOR) tab 10 mg 10 mg Oral Nightly   Estrogens Conjugated (PREMARIN) tab 0.3 mg 0.3 mg Oral Daily   Fluticasone Furoate-Vilanterol (BREO ELLIPTA) 200-25 MCG/INH

## 2019-01-07 NOTE — PROGRESS NOTES
[facility]  Progress Note    Phan Alfonso Patient Status:  Inpatient    1945 MRN [id number]   [facility] 3NE-A Attending Delmy Castaneda MD   Hosp Day # 5 PCP Genaro Clark MD         SUBJECTIVE:  Subjective:  Tunde Bush --    --   2.2   --    GLU  125*  135*  130*   --        Recent Labs   Lab  01/02/19   0849   ALT  23   AST  37   ALB  3.4       No results for input(s): PGLU in the last 168 hours.                 Meds:     • predniSONE  60 mg Oral Daily with breakfast

## 2019-01-08 NOTE — CM/SW NOTE
MSW called by patient, she now wants C, no preference of provider, ref sent to [facility].

## 2019-01-09 NOTE — PAYOR COMM NOTE
--------------  DISCHARGE REVIEW    Payor: Keke Greenwood  Subscriber #:  BJORN MIRANDA CHATO  Authorization Number: [id number]    Admit date: 1/2/19  Admit time:  7360  Discharge Date: 1/7/2019  3:46 PM     Admitting Physician:  Isela Lomeli MD  Attending P

## 2019-01-17 ENCOUNTER — HOSPITAL ENCOUNTER (OUTPATIENT)
Dept: MAMMOGRAPHY | Age: 74
Discharge: HOME | End: 2019-01-17
Attending: SPECIALIST
Payer: MEDICARE

## 2019-01-17 ENCOUNTER — HOSPITAL ENCOUNTER (OUTPATIENT)
Dept: BONE DENSITY | Age: 74
Discharge: HOME | End: 2019-01-17
Attending: SPECIALIST
Payer: MEDICARE

## 2019-01-17 DIAGNOSIS — M81.0 OSTEOPOROSIS: ICD-10-CM

## 2019-01-17 DIAGNOSIS — Z12.39 ENCOUNTER FOR SCREENING FOR MALIGNANT NEOPLASM OF BREAST: ICD-10-CM

## 2019-01-17 PROCEDURE — 77080 DXA BONE DENSITY AXIAL: CPT | Performed by: SPECIALIST

## 2019-01-17 PROCEDURE — 77067 SCR MAMMO BI INCL CAD: CPT | Performed by: SPECIALIST

## 2019-01-17 PROCEDURE — 77063 BREAST TOMOSYNTHESIS BI: CPT | Performed by: SPECIALIST

## 2019-02-03 ENCOUNTER — HOSPITAL ENCOUNTER (INPATIENT)
Facility: HOSPITAL | Age: 74
LOS: 3 days | Discharge: HOME HEALTH | DRG: 193 | End: 2019-02-06
Attending: EMERGENCY MEDICINE | Admitting: SPECIALIST
Payer: MEDICARE

## 2019-02-03 ENCOUNTER — APPOINTMENT (OUTPATIENT)
Dept: GENERAL RADIOLOGY | Facility: HOSPITAL | Age: 74
DRG: 193 | End: 2019-02-03
Attending: EMERGENCY MEDICINE
Payer: MEDICARE

## 2019-02-03 DIAGNOSIS — J18.9 NOSOCOMIAL PNEUMONIA: Primary | ICD-10-CM

## 2019-02-03 DIAGNOSIS — J44.1 COPD EXACERBATION (HCC): ICD-10-CM
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
## 2019-02-03 PROBLEM — R73.9 HYPERGLYCEMIA: Status: ACTIVE | Noted: 2019-02-03

## 2019-02-03 PROBLEM — D64.9 ANEMIA: Status: ACTIVE | Noted: 2019-02-03

## 2019-02-03 LAB
ADENOVIRUS PCR:: NEGATIVE
ALBUMIN SERPL-MCNC: 3.6 G/DL (ref 3.1–4.5)
ALBUMIN/GLOB SERPL: 0.8 {RATIO} (ref 1–2)
ALP LIVER SERPL-CCNC: 121 U/L (ref 55–142)
ALT SERPL-CCNC: 26 U/L (ref 14–54)
ANION GAP SERPL CALC-SCNC: 10 MMOL/L (ref 0–18)
AST SERPL-CCNC: 32 U/L (ref 15–41)
B PERT DNA SPEC QL NAA+PROBE: NEGATIVE
BASOPHILS # BLD AUTO: 0.02 X10(3) UL (ref 0–0.2)
BASOPHILS NFR BLD AUTO: 0.3 %
BILIRUB SERPL-MCNC: 0.4 MG/DL (ref 0.1–2)
BNP SERPL-MCNC: 103 PG/ML (ref 2–99)
BUN BLD-MCNC: 6 MG/DL (ref 8–20)
BUN/CREAT SERPL: 8.3 (ref 10–20)
C PNEUM DNA SPEC QL NAA+PROBE: NEGATIVE
CALCIUM BLD-MCNC: 9.6 MG/DL (ref 8.3–10.3)
CHLORIDE SERPL-SCNC: 101 MMOL/L (ref 101–111)
CO2 SERPL-SCNC: 28 MMOL/L (ref 22–32)
CORONAVIRUS 229E PCR:: NEGATIVE
CORONAVIRUS HKU1 PCR:: NEGATIVE
CORONAVIRUS NL63 PCR:: NEGATIVE
CORONAVIRUS OC43 PCR:: NEGATIVE
CREAT BLD-MCNC: 0.72 MG/DL (ref 0.55–1.02)
DEPRECATED RDW RBC AUTO: 41.5 FL (ref 35.1–46.3)
EOSINOPHIL # BLD AUTO: 0 X10(3) UL (ref 0–0.7)
EOSINOPHIL NFR BLD AUTO: 0 %
ERYTHROCYTE [DISTWIDTH] IN BLOOD BY AUTOMATED COUNT: 12.7 % (ref 11–15)
FLUAV H1 2009 PAND RNA SPEC QL NAA+PROBE: POSITIVE
FLUBV RNA SPEC QL NAA+PROBE: NEGATIVE
GLOBULIN PLAS-MCNC: 4.4 G/DL (ref 2.8–4.4)
GLUCOSE BLD-MCNC: 131 MG/DL (ref 70–99)
HCT VFR BLD AUTO: 36.5 % (ref 35–48)
HGB BLD-MCNC: 11.7 G/DL (ref 12–16)
IMM GRANULOCYTES # BLD AUTO: 0.05 X10(3) UL (ref 0–1)
IMM GRANULOCYTES NFR BLD: 0.8 %
LACTIC ACID: 1.6 MMOL/L (ref 0.5–2)
LYMPHOCYTES # BLD AUTO: 1.22 X10(3) UL (ref 1–4)
LYMPHOCYTES NFR BLD AUTO: 19.6 %
M PROTEIN MFR SERPL ELPH: 8 G/DL (ref 6.4–8.2)
MCH RBC QN AUTO: 28.4 PG (ref 26–34)
MCHC RBC AUTO-ENTMCNC: 32.1 G/DL (ref 31–37)
MCV RBC AUTO: 88.6 FL (ref 80–100)
METAPNEUMOVIRUS PCR:: NEGATIVE
MONOCYTES # BLD AUTO: 0.88 X10(3) UL (ref 0.1–1)
MONOCYTES NFR BLD AUTO: 14.1 %
MYCOPLASMA PNEUMONIA PCR:: NEGATIVE
NEUTROPHILS # BLD AUTO: 4.05 X10 (3) UL (ref 1.5–7.7)
NEUTROPHILS # BLD AUTO: 4.05 X10(3) UL (ref 1.5–7.7)
NEUTROPHILS NFR BLD AUTO: 65.2 %
OSMOLALITY SERPL CALC.SUM OF ELEC: 287 MOSM/KG (ref 275–295)
PARAINFLUENZA 1 PCR:: NEGATIVE
PARAINFLUENZA 2 PCR:: NEGATIVE
PARAINFLUENZA 3 PCR:: NEGATIVE
PARAINFLUENZA 4 PCR:: NEGATIVE
PLATELET # BLD AUTO: 268 10(3)UL (ref 150–450)
POTASSIUM SERPL-SCNC: 3.4 MMOL/L (ref 3.6–5.1)
RBC # BLD AUTO: 4.12 X10(6)UL (ref 3.8–5.3)
RHINOVIRUS/ENTERO PCR:: NEGATIVE
RSV RNA SPEC QL NAA+PROBE: NEGATIVE
SODIUM SERPL-SCNC: 139 MMOL/L (ref 136–144)
TROPONIN I SERPL-MCNC: <0.046 NG/ML (ref ?–0.05)
VENOUS BASE EXCESS: 4.3
VENOUS BLOOD GAS HCO3: 28.8 MEQ/L (ref 23–27)
VENOUS O2 SAT CALC: 64 % (ref 72–78)
VENOUS PCO2: 42 MM HG (ref 38–50)
VENOUS PH: 7.45 (ref 7.33–7.43)
VENOUS PO2: 31 MM HG (ref 30–50)
WBC # BLD AUTO: 6.2 X10(3) UL (ref 4–11)

## 2019-02-03 PROCEDURE — 93010 ELECTROCARDIOGRAM REPORT: CPT

## 2019-02-03 PROCEDURE — 82803 BLOOD GASES ANY COMBINATION: CPT | Performed by: EMERGENCY MEDICINE

## 2019-02-03 PROCEDURE — 94645 CONT INHLJ TX EACH ADDL HOUR: CPT

## 2019-02-03 PROCEDURE — 99285 EMERGENCY DEPT VISIT HI MDM: CPT

## 2019-02-03 PROCEDURE — 87486 CHLMYD PNEUM DNA AMP PROBE: CPT | Performed by: EMERGENCY MEDICINE

## 2019-02-03 PROCEDURE — 94640 AIRWAY INHALATION TREATMENT: CPT

## 2019-02-03 PROCEDURE — 87581 M.PNEUMON DNA AMP PROBE: CPT | Performed by: EMERGENCY MEDICINE

## 2019-02-03 PROCEDURE — 87633 RESP VIRUS 12-25 TARGETS: CPT | Performed by: EMERGENCY MEDICINE

## 2019-02-03 PROCEDURE — 80053 COMPREHEN METABOLIC PANEL: CPT | Performed by: EMERGENCY MEDICINE

## 2019-02-03 PROCEDURE — 84484 ASSAY OF TROPONIN QUANT: CPT | Performed by: EMERGENCY MEDICINE

## 2019-02-03 PROCEDURE — 96365 THER/PROPH/DIAG IV INF INIT: CPT

## 2019-02-03 PROCEDURE — 87999 UNLISTED MICROBIOLOGY PX: CPT

## 2019-02-03 PROCEDURE — 87040 BLOOD CULTURE FOR BACTERIA: CPT | Performed by: EMERGENCY MEDICINE

## 2019-02-03 PROCEDURE — 36415 COLL VENOUS BLD VENIPUNCTURE: CPT

## 2019-02-03 PROCEDURE — 94644 CONT INHLJ TX 1ST HOUR: CPT

## 2019-02-03 PROCEDURE — 83880 ASSAY OF NATRIURETIC PEPTIDE: CPT | Performed by: EMERGENCY MEDICINE

## 2019-02-03 PROCEDURE — 96375 TX/PRO/DX INJ NEW DRUG ADDON: CPT

## 2019-02-03 PROCEDURE — 71045 X-RAY EXAM CHEST 1 VIEW: CPT | Performed by: EMERGENCY MEDICINE

## 2019-02-03 PROCEDURE — 85025 COMPLETE CBC W/AUTO DIFF WBC: CPT | Performed by: EMERGENCY MEDICINE

## 2019-02-03 PROCEDURE — 93005 ELECTROCARDIOGRAM TRACING: CPT

## 2019-02-03 PROCEDURE — 87798 DETECT AGENT NOS DNA AMP: CPT | Performed by: EMERGENCY MEDICINE

## 2019-02-03 PROCEDURE — 83605 ASSAY OF LACTIC ACID: CPT | Performed by: EMERGENCY MEDICINE

## 2019-02-03 RX ORDER — IPRATROPIUM BROMIDE AND ALBUTEROL SULFATE 2.5; .5 MG/3ML; MG/3ML
3 SOLUTION RESPIRATORY (INHALATION)
Status: DISCONTINUED | OUTPATIENT
Start: 2019-02-04 | End: 2019-02-06

## 2019-02-03 RX ORDER — SODIUM CHLORIDE 30 MG/ML INHALATION SOLUTION 30 MG/ML
3 SOLUTION INHALANT
Status: DISCONTINUED | OUTPATIENT
Start: 2019-02-03 | End: 2019-02-06

## 2019-02-03 RX ORDER — HYDROCODONE BITARTRATE AND ACETAMINOPHEN 10; 325 MG/1; MG/1
1 TABLET ORAL EVERY 6 HOURS PRN
Status: DISCONTINUED | OUTPATIENT
Start: 2019-02-03 | End: 2019-02-06

## 2019-02-03 RX ORDER — METOCLOPRAMIDE 10 MG/1
10 TABLET ORAL
Status: DISCONTINUED | OUTPATIENT
Start: 2019-02-03 | End: 2019-02-06

## 2019-02-03 RX ORDER — METHYLPREDNISOLONE SODIUM SUCCINATE 125 MG/2ML
125 INJECTION, POWDER, LYOPHILIZED, FOR SOLUTION INTRAMUSCULAR; INTRAVENOUS ONCE
Status: COMPLETED | OUTPATIENT
Start: 2019-02-03 | End: 2019-02-03

## 2019-02-03 RX ORDER — POTASSIUM CHLORIDE 20 MEQ/1
40 TABLET, EXTENDED RELEASE ORAL ONCE
Status: COMPLETED | OUTPATIENT
Start: 2019-02-03 | End: 2019-02-03

## 2019-02-03 RX ORDER — ACETAMINOPHEN 500 MG
1000 TABLET ORAL ONCE
Status: COMPLETED | OUTPATIENT
Start: 2019-02-03 | End: 2019-02-03

## 2019-02-03 RX ORDER — ONDANSETRON 2 MG/ML
4 INJECTION INTRAMUSCULAR; INTRAVENOUS ONCE
Status: COMPLETED | OUTPATIENT
Start: 2019-02-03 | End: 2019-02-03

## 2019-02-03 RX ORDER — MULTIPLE VITAMINS W/ MINERALS TAB 9MG-400MCG
1 TAB ORAL DAILY
Status: DISCONTINUED | OUTPATIENT
Start: 2019-02-04 | End: 2019-02-06

## 2019-02-03 RX ORDER — IBUPROFEN 600 MG/1
600 TABLET ORAL ONCE
Status: DISCONTINUED | OUTPATIENT
Start: 2019-02-03 | End: 2019-02-06

## 2019-02-03 RX ORDER — IPRATROPIUM BROMIDE AND ALBUTEROL SULFATE 2.5; .5 MG/3ML; MG/3ML
3 SOLUTION RESPIRATORY (INHALATION)
Status: DISCONTINUED | OUTPATIENT
Start: 2019-02-03 | End: 2019-02-06

## 2019-02-03 RX ORDER — IPRATROPIUM BROMIDE AND ALBUTEROL SULFATE 2.5; .5 MG/3ML; MG/3ML
3 SOLUTION RESPIRATORY (INHALATION) ONCE
Status: DISCONTINUED | OUTPATIENT
Start: 2019-02-03 | End: 2019-02-03

## 2019-02-03 RX ORDER — OSELTAMIVIR PHOSPHATE 30 MG/1
30 CAPSULE ORAL 2 TIMES DAILY
Status: DISCONTINUED | OUTPATIENT
Start: 2019-02-03 | End: 2019-02-06

## 2019-02-03 RX ORDER — ATORVASTATIN CALCIUM 10 MG/1
10 TABLET, FILM COATED ORAL NIGHTLY
Status: DISCONTINUED | OUTPATIENT
Start: 2019-02-03 | End: 2019-02-06

## 2019-02-04 ENCOUNTER — APPOINTMENT (OUTPATIENT)
Dept: GENERAL RADIOLOGY | Facility: HOSPITAL | Age: 74
DRG: 193 | End: 2019-02-04
Attending: SPECIALIST
Payer: MEDICARE

## 2019-02-04 LAB
ALBUMIN SERPL-MCNC: 3.1 G/DL (ref 3.1–4.5)
ALBUMIN/GLOB SERPL: 0.7 {RATIO} (ref 1–2)
ALP LIVER SERPL-CCNC: 110 U/L (ref 55–142)
ALT SERPL-CCNC: 28 U/L (ref 14–54)
ANION GAP SERPL CALC-SCNC: 6 MMOL/L (ref 0–18)
AST SERPL-CCNC: 36 U/L (ref 15–41)
ATRIAL RATE: 114 BPM
BASOPHILS # BLD AUTO: 0.01 X10(3) UL (ref 0–0.2)
BASOPHILS NFR BLD AUTO: 0.2 %
BILIRUB SERPL-MCNC: 0.4 MG/DL (ref 0.1–2)
BUN BLD-MCNC: 7 MG/DL (ref 8–20)
BUN/CREAT SERPL: 11.5 (ref 10–20)
CALCIUM BLD-MCNC: 9 MG/DL (ref 8.3–10.3)
CHLORIDE SERPL-SCNC: 106 MMOL/L (ref 101–111)
CO2 SERPL-SCNC: 30 MMOL/L (ref 22–32)
CREAT BLD-MCNC: 0.61 MG/DL (ref 0.55–1.02)
DEPRECATED RDW RBC AUTO: 42.5 FL (ref 35.1–46.3)
EOSINOPHIL # BLD AUTO: 0 X10(3) UL (ref 0–0.7)
EOSINOPHIL NFR BLD AUTO: 0 %
ERYTHROCYTE [DISTWIDTH] IN BLOOD BY AUTOMATED COUNT: 12.9 % (ref 11–15)
GLOBULIN PLAS-MCNC: 4.4 G/DL (ref 2.8–4.4)
GLUCOSE BLD-MCNC: 184 MG/DL (ref 70–99)
HCT VFR BLD AUTO: 34.4 % (ref 35–48)
HGB BLD-MCNC: 10.9 G/DL (ref 12–16)
IMM GRANULOCYTES # BLD AUTO: 0.02 X10(3) UL (ref 0–1)
IMM GRANULOCYTES NFR BLD: 0.4 %
LYMPHOCYTES # BLD AUTO: 1.44 X10(3) UL (ref 1–4)
LYMPHOCYTES NFR BLD AUTO: 25.4 %
M PROTEIN MFR SERPL ELPH: 7.5 G/DL (ref 6.4–8.2)
MCH RBC QN AUTO: 28.2 PG (ref 26–34)
MCHC RBC AUTO-ENTMCNC: 31.7 G/DL (ref 31–37)
MCV RBC AUTO: 88.9 FL (ref 80–100)
MONOCYTES # BLD AUTO: 0.26 X10(3) UL (ref 0.1–1)
MONOCYTES NFR BLD AUTO: 4.6 %
NEUTROPHILS # BLD AUTO: 3.95 X10 (3) UL (ref 1.5–7.7)
NEUTROPHILS # BLD AUTO: 3.95 X10(3) UL (ref 1.5–7.7)
NEUTROPHILS NFR BLD AUTO: 69.4 %
OSMOLALITY SERPL CALC.SUM OF ELEC: 297 MOSM/KG (ref 275–295)
P AXIS: 32 DEGREES
P-R INTERVAL: 158 MS
PLATELET # BLD AUTO: 287 10(3)UL (ref 150–450)
POTASSIUM SERPL-SCNC: 4.1 MMOL/L (ref 3.6–5.1)
Q-T INTERVAL: 318 MS
QRS DURATION: 88 MS
QTC CALCULATION (BEZET): 438 MS
R AXIS: 34 DEGREES
RBC # BLD AUTO: 3.87 X10(6)UL (ref 3.8–5.3)
SODIUM SERPL-SCNC: 142 MMOL/L (ref 136–144)
T AXIS: 29 DEGREES
VENTRICULAR RATE: 114 BPM
WBC # BLD AUTO: 5.7 X10(3) UL (ref 4–11)

## 2019-02-04 PROCEDURE — 94640 AIRWAY INHALATION TREATMENT: CPT

## 2019-02-04 PROCEDURE — 87070 CULTURE OTHR SPECIMN AEROBIC: CPT | Performed by: INTERNAL MEDICINE

## 2019-02-04 PROCEDURE — 85025 COMPLETE CBC W/AUTO DIFF WBC: CPT | Performed by: SPECIALIST

## 2019-02-04 PROCEDURE — 80053 COMPREHEN METABOLIC PANEL: CPT | Performed by: SPECIALIST

## 2019-02-04 PROCEDURE — 97161 PT EVAL LOW COMPLEX 20 MIN: CPT

## 2019-02-04 PROCEDURE — 71045 X-RAY EXAM CHEST 1 VIEW: CPT | Performed by: SPECIALIST

## 2019-02-04 PROCEDURE — 94667 MNPJ CHEST WALL 1ST: CPT

## 2019-02-04 PROCEDURE — 87205 SMEAR GRAM STAIN: CPT | Performed by: INTERNAL MEDICINE

## 2019-02-04 PROCEDURE — 97116 GAIT TRAINING THERAPY: CPT

## 2019-02-04 RX ORDER — METHYLPREDNISOLONE SODIUM SUCCINATE 125 MG/2ML
60 INJECTION, POWDER, LYOPHILIZED, FOR SOLUTION INTRAMUSCULAR; INTRAVENOUS ONCE
Status: COMPLETED | OUTPATIENT
Start: 2019-02-04 | End: 2019-02-04

## 2019-02-04 RX ORDER — HEPARIN SODIUM 5000 [USP'U]/ML
5000 INJECTION, SOLUTION INTRAVENOUS; SUBCUTANEOUS EVERY 12 HOURS
Status: DISCONTINUED | OUTPATIENT
Start: 2019-02-04 | End: 2019-02-04

## 2019-02-04 RX ORDER — DIPHENHYDRAMINE HYDROCHLORIDE 50 MG/ML
25 INJECTION INTRAMUSCULAR; INTRAVENOUS EVERY 6 HOURS PRN
Status: DISCONTINUED | OUTPATIENT
Start: 2019-02-04 | End: 2019-02-06

## 2019-02-04 RX ORDER — HYDRALAZINE HYDROCHLORIDE 20 MG/ML
10 INJECTION INTRAMUSCULAR; INTRAVENOUS EVERY 6 HOURS PRN
Status: DISCONTINUED | OUTPATIENT
Start: 2019-02-04 | End: 2019-02-06

## 2019-02-04 RX ORDER — FAMOTIDINE 20 MG/1
20 TABLET ORAL 2 TIMES DAILY
Status: DISCONTINUED | OUTPATIENT
Start: 2019-02-04 | End: 2019-02-06

## 2019-02-04 RX ORDER — ENOXAPARIN SODIUM 100 MG/ML
40 INJECTION SUBCUTANEOUS DAILY
Status: DISCONTINUED | OUTPATIENT
Start: 2019-02-04 | End: 2019-02-06

## 2019-02-04 RX ORDER — FAMOTIDINE 10 MG/ML
20 INJECTION, SOLUTION INTRAVENOUS 2 TIMES DAILY
Status: DISCONTINUED | OUTPATIENT
Start: 2019-02-04 | End: 2019-02-06

## 2019-02-04 RX ORDER — ACETAMINOPHEN 325 MG/1
650 TABLET ORAL EVERY 6 HOURS PRN
Status: DISCONTINUED | OUTPATIENT
Start: 2019-02-04 | End: 2019-02-06

## 2019-02-04 NOTE — CM/SW NOTE
02/04/19 1500   CM/SW Referral Data   Referral Source Social Work (self-referral);    Reason for Referral Discharge planning;Readmission   Informant Patient   Readmission Assessment   Factors that patient feels contributed to this readmission orders obtained for resumption of C services upon discharge. QD completed. SW will follow for anticipated discharge home with resumption of [place] [id number] services.      [facility]  P:[phone]  U:[phone]

## 2019-02-04 NOTE — CONSULTS
[facility] Pharmacy Note:  Renal Adjustment for aztreonam (AZACTAM)    Phan Alfonso is a 69year old female who has been prescribed aztreonam (AZACTAM) 1g every 6 hrs. CrCl is estimated creatinine clearance is 55 mL/min (based on SCr of 0.72 mg/dL).  so

## 2019-02-04 NOTE — PROGRESS NOTES
02/04/19 5681   Clinical Encounter Type   Visited With Patient   Patient's Supportive Strategies/Resources Patient finds community/spiritual support at [place]. [facility] in [facility].     Referral To Nurse  (Patient signed HPOA form/listing her d

## 2019-02-04 NOTE — PROGRESS NOTES
[facility] Pharmacy Note:  Renal Adjustment for oseltamivir (TAMIFLU)    Henny Augustin is a 69year old female who has been prescribed oseltamivir (TAMIFLU) 75 mg every 12 hrs.   CrCl is estimated creatinine clearance is 55 mL/min (based on SCr of 0.72 mg/

## 2019-02-04 NOTE — CONSULTS
[facility]  Report of Consultation    Blane Parham Patient Status:  Inpatient    1945 MRN [id number]   [facility] 5NW-A Attending Marija Davis MD   Hosp Day # 1 PCP Hudson Warner MD     Reason for Consultation:  D [Levofloxa*    DIZZINESS, OTHER (SEE COMMENTS)    Comment:Hot, flushed. Medications:      No current outpatient medications on file.       Current Facility-Administered Medications:   •  acetaminophen (TYLENOL) tab 650 mg, 650 mg, Oral, Q6H PRN  •  Diphe normal.   Throat: Lips, mucosa, and tongue normal.  No thrush noted. Neck: Soft, supple neck; trachea midline, no adenopathy, no thyromegaly. Lungs:  rhinohuy r>l    Chest wall: No tenderness or deformity.    Heart: Regular rate and rhythm, normal S1S2, [id number], CHOB      Cultures:       Radiology:  Reviewed  Personally       ASSESSMENT    1. Influenza A H1N1  2. RLL PNA , possible CA-PNA   3.  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 Sarcoidosis-only on intermittent steroi

## 2019-02-04 NOTE — PHYSICAL THERAPY NOTE
PHYSICAL THERAPY EVALUATION - INPATIENT     Room Number: 973/335-B  Evaluation Date: 2/4/2019  Type of Evaluation: Initial  Physician Order: PT Eval and Treat    Presenting Problem: PNA, COPD exacerbation  Reason for Therapy: Mobility Dysfunction and IADLs. Able to community amb without AD. No O2 use at home. Pt had assisting in caretaking for  who is recently . SUBJECTIVE  \"I'm getting tired so easily while here. \"    Patient self-stated goal is tot return home.     OBJECTIVE  Precau Standardized Score (AM-PAC Scale): 47.67   CMS Modifier (G-Code): CJ    FUNCTIONAL ABILITY STATUS  Gait Assessment   Gait Assistance: Contact guard assist  Distance (ft): 175  Assistive Device: None  Pattern: Shuffle(decreased mary)          Skilled T considered low. These impairments and comorbidities manifest themselves as functional limitations in independent gait.   The patient is below baseline and would benefit from skilled inpatient PT to address the above deficits along with education on energy

## 2019-02-04 NOTE — RESPIRATORY THERAPY NOTE
RECEIVED PT ON RA- SPO2 96%. COMPLAINING OF  ON EXERTION BUT OTHERWISE COMFORTABLE. BS CLEAR/DIM. RHONCHORUS AT TIMES BUT CLEARS WITH A COUGH. GAVE PT AN IS (1,000ML), AND PEP AND EXPLAINED THE IMPORTANCE.  WILL CHANGE NEBS FROM METANEB TO REGULAR NEBS A

## 2019-02-04 NOTE — RESPIRATORY THERAPY NOTE
RECEIVED PT ON 3L NC 96%. INSP/EXP WHEEZE WITH RHONCHI/COARSENESS THROUGHOUT. PT HAS COURSE COUGH PRODUCING TAN/YELLOW SPUTUM. GAVE A SAMPLE CUP FOR TESTING. ALSO GAVE PT PEP/IS. -1000 ON IS. EXPLAINED THE IMPORTANCE OF BREATHING EXERCISES.  WILL CON

## 2019-02-04 NOTE — H&P
[facility]    PATIENT'S NAME: [place], [place] RANCHO   ATTENDING PHYSICIAN: Reyes Ryder M.D.    PATIENT ACCOUNT#:   [de-identified]    LOCATION:  [address] [place] [address]  MEDICAL RECORD #:   [id number]       YOB: 1945  ADMISSION DATE:       02/0 deficit. JOINTS:  No apparent acute fracture. SKIN:  Warm. No rashes. LABORATORY DATA:  Glucose 184, BUN 7, creatinine 0.6. LFTs are okay. Hemoglobin 10.9, WBC 5.7, platelets 644,010. H1N1 influenza positive.     IMPRESSION AND PLAN:  A 69year-old

## 2019-02-04 NOTE — ED INITIAL ASSESSMENT (HPI)
Patient presents tachypnic, audible inspiratory/expiratory wheezing and fevers. Patient states symptoms started last night.

## 2019-02-04 NOTE — ED PROVIDER NOTES
Patient Seen in: [facility] Emergency Department    History   Patient presents with:  Dyspnea IRIS SOB (respiratory)    Stated Complaint: SOB, fever, sarcoidosis    HPI    Patient is a 69-year-old female presents emergency room for evaluation of shor Wt 91.6 kg   SpO2 100%   BMI 36.95 kg/m²         Physical Exam    GENERAL: Patient does have audible wheezing on exam but is not in distress. She is able to speak in full sentences. Alert and oriented x3. HEENT: Normocephalic, atraumatic.   Moist mucou Narrative: The following orders were created for panel order CBC WITH DIFFERENTIAL WITH PLATELET.   Procedure                               Abnormality         Status                     ---------                               -----------         ------ sarcoidosis  PATIENT STATED HISTORY: (As transcribed by Technologist)  Shortness of breath, fever, sarcoidosis. FINDINGS:  Slight interval increasing airspace opacities at the right lung base. This may represent an area of developing consolidation.   Ba Renan from pulmonary. Patient given antibiotics, steroids. Will be admitted to medical telemetry. Appears to have worsening pneumonia right lower lobe. Patient was screened and evaluated during this visit.    As a treating physician attending to the

## 2019-02-05 LAB
ALBUMIN SERPL-MCNC: 2.9 G/DL (ref 3.1–4.5)
ALBUMIN/GLOB SERPL: 0.7 {RATIO} (ref 1–2)
ALP LIVER SERPL-CCNC: 95 U/L (ref 55–142)
ALT SERPL-CCNC: 26 U/L (ref 14–54)
ANION GAP SERPL CALC-SCNC: 8 MMOL/L (ref 0–18)
AST SERPL-CCNC: 36 U/L (ref 15–41)
BASOPHILS # BLD AUTO: 0.01 X10(3) UL (ref 0–0.2)
BASOPHILS NFR BLD AUTO: 0.1 %
BILIRUB SERPL-MCNC: 0.2 MG/DL (ref 0.1–2)
BUN BLD-MCNC: 13 MG/DL (ref 8–20)
BUN/CREAT SERPL: 20.6 (ref 10–20)
CALCIUM BLD-MCNC: 8.9 MG/DL (ref 8.3–10.3)
CHLORIDE SERPL-SCNC: 106 MMOL/L (ref 101–111)
CO2 SERPL-SCNC: 27 MMOL/L (ref 22–32)
CREAT BLD-MCNC: 0.63 MG/DL (ref 0.55–1.02)
DEPRECATED RDW RBC AUTO: 42.9 FL (ref 35.1–46.3)
EOSINOPHIL # BLD AUTO: 0 X10(3) UL (ref 0–0.7)
EOSINOPHIL NFR BLD AUTO: 0 %
ERYTHROCYTE [DISTWIDTH] IN BLOOD BY AUTOMATED COUNT: 13.2 % (ref 11–15)
GLOBULIN PLAS-MCNC: 4 G/DL (ref 2.8–4.4)
GLUCOSE BLD-MCNC: 103 MG/DL (ref 70–99)
HAV IGM SER QL: 2.1 MG/DL (ref 1.8–2.5)
HCT VFR BLD AUTO: 34.8 % (ref 35–48)
HGB BLD-MCNC: 11 G/DL (ref 12–16)
IMM GRANULOCYTES # BLD AUTO: 0.03 X10(3) UL (ref 0–1)
IMM GRANULOCYTES NFR BLD: 0.3 %
LYMPHOCYTES # BLD AUTO: 2.47 X10(3) UL (ref 1–4)
LYMPHOCYTES NFR BLD AUTO: 26.6 %
M PROTEIN MFR SERPL ELPH: 6.9 G/DL (ref 6.4–8.2)
MCH RBC QN AUTO: 28.3 PG (ref 26–34)
MCHC RBC AUTO-ENTMCNC: 31.6 G/DL (ref 31–37)
MCV RBC AUTO: 89.5 FL (ref 80–100)
MONOCYTES # BLD AUTO: 1.25 X10(3) UL (ref 0.1–1)
MONOCYTES NFR BLD AUTO: 13.4 %
NEUTROPHILS # BLD AUTO: 5.54 X10 (3) UL (ref 1.5–7.7)
NEUTROPHILS # BLD AUTO: 5.54 X10(3) UL (ref 1.5–7.7)
NEUTROPHILS NFR BLD AUTO: 59.6 %
OSMOLALITY SERPL CALC.SUM OF ELEC: 292 MOSM/KG (ref 275–295)
PHOSPHATE SERPL-MCNC: 3.7 MG/DL (ref 2.5–4.9)
PLATELET # BLD AUTO: 307 10(3)UL (ref 150–450)
POTASSIUM SERPL-SCNC: 3.9 MMOL/L (ref 3.6–5.1)
RBC # BLD AUTO: 3.89 X10(6)UL (ref 3.8–5.3)
SODIUM SERPL-SCNC: 141 MMOL/L (ref 136–144)
WBC # BLD AUTO: 9.3 X10(3) UL (ref 4–11)

## 2019-02-05 PROCEDURE — 97535 SELF CARE MNGMENT TRAINING: CPT

## 2019-02-05 PROCEDURE — 80053 COMPREHEN METABOLIC PANEL: CPT | Performed by: INTERNAL MEDICINE

## 2019-02-05 PROCEDURE — 94640 AIRWAY INHALATION TREATMENT: CPT

## 2019-02-05 PROCEDURE — 85025 COMPLETE CBC W/AUTO DIFF WBC: CPT | Performed by: INTERNAL MEDICINE

## 2019-02-05 PROCEDURE — 94668 MNPJ CHEST WALL SBSQ: CPT

## 2019-02-05 PROCEDURE — 83735 ASSAY OF MAGNESIUM: CPT | Performed by: INTERNAL MEDICINE

## 2019-02-05 PROCEDURE — 84100 ASSAY OF PHOSPHORUS: CPT | Performed by: INTERNAL MEDICINE

## 2019-02-05 PROCEDURE — 94664 DEMO&/EVAL PT USE INHALER: CPT

## 2019-02-05 PROCEDURE — 97165 OT EVAL LOW COMPLEX 30 MIN: CPT

## 2019-02-05 NOTE — PROGRESS NOTES
[facility]  Progress Note    Melissa Marie Patient Status:  Inpatient    1945 MRN [id number]   [facility] 5NW-A Attending Hamzah Hogan MD   Hosp Day # 2 PCP Génesis Benton MD     ASSESSMENT     1. Influenza A H1N1  2. height 5' 2\" (1.575 m), weight 202 lb (91.6 kg), SpO2 97 %.     Intake/Output:    Intake/Output Summary (Last 24 hours) at 2/5/2019 1330  Last data filed at 2/5/2019 0700  Gross per 24 hour   Intake 240 ml   Output 1600 ml   Net -1360 ml     Wt Readings fr mg Oral BID   Or      famoTIDine (PEPCID) injection 20 mg 20 mg Intravenous BID   Enoxaparin Sodium (LOVENOX) 40 MG/0.4ML injection 40 mg 40 mg Subcutaneous Daily   ibuprofen (MOTRIN) tab 600 mg 600 mg Oral Once   atorvastatin (LIPITOR) tab 10 mg 10 mg Ora

## 2019-02-05 NOTE — PROGRESS NOTES
[facility]  Progress Note    Jennifer Gaspar Patient Status:  Inpatient    1945 MRN [id number]   [facility] 5NW-A Attending Vitaliy Madison MD   Hosp Day # 2 PCP Karina Orellana MD         SUBJECTIVE:  Subjective:  Laura Powers 36  36   ALB  3.6  3.1  2.9*       No results for input(s): PGLU in the last 168 hours. No results for input(s): URINE, CULTI, BLDSMR in the last 168 hours.             Meds:     • famoTIDine  20 mg Oral BID    Or   • famoTIDine  20 mg Intravenous BID ordered,  Available and radiology reviewed  All consultant notes reviewed  Discussed with nursing on floor  dvt prophylaxis reviewed  PT and/or OT  Alyssa Estevez MD

## 2019-02-05 NOTE — OCCUPATIONAL THERAPY NOTE
OCCUPATIONAL THERAPY QUICK EVALUATION - INPATIENT    Room Number: 751/736-R  Evaluation Date: 2/5/2019     Type of Evaluation: Initial  Presenting Problem: PNA    Physician Order: IP Consult to Occupational Therapy  Reason for Therapy:  ADL/IADL Dysfunctio to go home    OBJECTIVE  Precautions: None  Fall Risk: Standard fall risk    WEIGHT BEARING RESTRICTION  Weight Bearing Restriction: None                PAIN ASSESSMENT  Ratin  Location: no pain       COGNITION  WNL    RANGE OF MOTION AND STRENGTH ASSE dynamic reaching and functional mobility safely, without loss of balance, and at supervision level or above. Patient reports no further questions or concerns about safe return to I/ADL tasks. No further OT services needed at this time.       Patient Complex

## 2019-02-05 NOTE — PROGRESS NOTES
Multidisciplinary Discharge Rounds held 2/5/2019. Treatment team members present today include , , Charge Nurse,  Nurse, RT, PT and Pharmacy caring for [employer]      Other care providers present:    Patient Active P

## 2019-02-05 NOTE — PAYOR COMM NOTE
--------------  ADMISSION REVIEW         2/3    ED     History   Patient presents with:  Dyspnea IRIS SOB (respiratory)     Stated Complaint: SOB, fever, sarcoidosis+ AGUIAR     HPI     Patient is a 50-year-old female presents emergency room for evaluation of hypertension     • High blood pressure     • Osteoarthritis     • Pneumonia     • Pneumonia due to organism     • Sarcoidosis           ED Triage Vitals [02/03/19 1907]   /74   Pulse 115   Resp 26   Temp (!) 101.4 °F (38.6 °C)   Temp src Oral   SpO2

## 2019-02-06 ENCOUNTER — APPOINTMENT (OUTPATIENT)
Dept: GENERAL RADIOLOGY | Facility: HOSPITAL | Age: 74
DRG: 193 | End: 2019-02-06
Attending: INTERNAL MEDICINE
Payer: MEDICARE

## 2019-02-06 VITALS
DIASTOLIC BLOOD PRESSURE: 62 MMHG | RESPIRATION RATE: 16 BRPM | HEIGHT: 62 IN | OXYGEN SATURATION: 94 % | WEIGHT: 202 LBS | HEART RATE: 86 BPM | BODY MASS INDEX: 37.17 KG/M2 | SYSTOLIC BLOOD PRESSURE: 147 MMHG | TEMPERATURE: 99 F

## 2019-02-06 LAB
BASOPHILS # BLD AUTO: 0.01 X10(3) UL (ref 0–0.2)
BASOPHILS NFR BLD AUTO: 0.2 %
DEPRECATED RDW RBC AUTO: 43.7 FL (ref 35.1–46.3)
EOSINOPHIL # BLD AUTO: 0.01 X10(3) UL (ref 0–0.7)
EOSINOPHIL NFR BLD AUTO: 0.2 %
ERYTHROCYTE [DISTWIDTH] IN BLOOD BY AUTOMATED COUNT: 13.2 % (ref 11–15)
HCT VFR BLD AUTO: 35.5 % (ref 35–48)
HGB BLD-MCNC: 11.1 G/DL (ref 12–16)
IMM GRANULOCYTES # BLD AUTO: 0.01 X10(3) UL (ref 0–1)
IMM GRANULOCYTES NFR BLD: 0.2 %
LYMPHOCYTES # BLD AUTO: 3.52 X10(3) UL (ref 1–4)
LYMPHOCYTES NFR BLD AUTO: 56.2 %
MCH RBC QN AUTO: 27.9 PG (ref 26–34)
MCHC RBC AUTO-ENTMCNC: 31.3 G/DL (ref 31–37)
MCV RBC AUTO: 89.2 FL (ref 80–100)
MONOCYTES # BLD AUTO: 0.9 X10(3) UL (ref 0.1–1)
MONOCYTES NFR BLD AUTO: 14.4 %
NEUTROPHILS # BLD AUTO: 1.81 X10 (3) UL (ref 1.5–7.7)
NEUTROPHILS # BLD AUTO: 1.81 X10(3) UL (ref 1.5–7.7)
NEUTROPHILS NFR BLD AUTO: 28.8 %
PLATELET # BLD AUTO: 332 10(3)UL (ref 150–450)
RBC # BLD AUTO: 3.98 X10(6)UL (ref 3.8–5.3)
WBC # BLD AUTO: 6.3 X10(3) UL (ref 4–11)

## 2019-02-06 PROCEDURE — 71045 X-RAY EXAM CHEST 1 VIEW: CPT | Performed by: INTERNAL MEDICINE

## 2019-02-06 PROCEDURE — 94640 AIRWAY INHALATION TREATMENT: CPT

## 2019-02-06 PROCEDURE — 94668 MNPJ CHEST WALL SBSQ: CPT

## 2019-02-06 PROCEDURE — 85025 COMPLETE CBC W/AUTO DIFF WBC: CPT | Performed by: INTERNAL MEDICINE

## 2019-02-06 PROCEDURE — S0028 INJECTION, FAMOTIDINE, 20 MG: HCPCS | Performed by: INTERNAL MEDICINE

## 2019-02-06 RX ORDER — ALBUTEROL SULFATE 2.5 MG/3ML
2.5 SOLUTION RESPIRATORY (INHALATION) 4 TIMES DAILY
Qty: 120 VIAL | Refills: 2 | Status: ON HOLD | OUTPATIENT
Start: 2019-02-06 | End: 2020-01-10

## 2019-02-06 RX ORDER — OSELTAMIVIR PHOSPHATE 30 MG/1
30 CAPSULE ORAL 2 TIMES DAILY
Qty: 4 CAPSULE | Refills: 0 | Status: SHIPPED | OUTPATIENT
Start: 2019-02-06 | End: 2019-09-20

## 2019-02-06 NOTE — DISCHARGE SUMMARY
[facility]  Discharge Summary    Alisia Traylor Patient Status:  Inpatient    1945 MRN [id number]   [facility] 5NW-A Attending Dorie Pérez MD   Hosp Day # 3 PCP Olivia Wang MD     Date of Admission: 2/3/2019    Rosalio focal deficits                                                        Data Review:       Labs:             Recent Labs   Lab  02/03/19   1916  02/04/19   0559  02/05/19   0608  02/06/19   0625   WBC  6.2  5.7  9.3  6.3   HGB  11.7*  10.9*  11.0*  11.1*   M possible community-acquired pneumonia:  Antibiotics.  Sputum culture.  IV aztreonam.  DuoNeb.        2.       [facility]  P[facility] following.     3.       Sarcoidosis, currently only on interm Sulfate  (90 Base) MCG/ACT Inhalation Aero Soln  Inhale 2 puffs into the lungs every 4 (four) hours as needed for Wheezing or Shortness of Breath. CARRY YOUR INHALER WITH YOU.   Qty: 1 Inhaler Refills: 0    sodium chloride 3 % Inhalation Nebu Soln  T

## 2019-02-06 NOTE — PROGRESS NOTES
[facility]  Progress Note    Lizz Emmanuel Patient Status:  Inpatient    1945 MRN [id number]   [facility] 5NW-A Attending Rola Colon MD   Hosp Day # 3 PCP Benigno Gold MD     ASSESSMENT     1.  Influenza A H1N1 16, height 5' 2\" (1.575 m), weight 202 lb (91.6 kg), SpO2 94 %.     Intake/Output:    Intake/Output Summary (Last 24 hours) at 2/6/2019 1437  Last data filed at 2/6/2019 1200  Gross per 24 hour   Intake 480 ml   Output —   Net 480 ml     Wt Readings from L famoTIDine (PEPCID) tab 20 mg 20 mg Oral BID   Or      famoTIDine (PEPCID) injection 20 mg 20 mg Intravenous BID   Enoxaparin Sodium (LOVENOX) 40 MG/0.4ML injection 40 mg 40 mg Subcutaneous Daily   ibuprofen (MOTRIN) tab 600 mg 600 mg Oral Once   atorvas

## 2019-02-06 NOTE — PROGRESS NOTES
NURSING DISCHARGE NOTE    Discharged Home via Wheelchair. Accompanied by Family member  Belongings Taken by patient/family. Iv discontinued. VSS. Discharge instructions and prescriptions given to patient and her daughter.  They verbalized understand

## 2019-02-06 NOTE — PROGRESS NOTES
[facility]  Progress Note    Alpa Bernal Patient Status:  Inpatient    1945 MRN [id number]   [facility] 5NW-A Attending Tracy Barrientos MD   Hosp Day # 3 PCP Kayleen Tomas MD         SUBJECTIVE:  Subjective:  Rubén Romero PGLU in the last 168 hours. No results for input(s): URINE, CULTI, BLDSMR in the last 168 hours.             Meds:     • famoTIDine  20 mg Oral BID    Or   • famoTIDine  20 mg Intravenous BID   • enoxaparin  40 mg Subcutaneous Daily   • ibuprofen  600 mg ordered,  Available and radiology reviewed  All consultant notes reviewed  Discussed with nursing on floor  dvt prophylaxis reviewed  PT and/or OT  Lilia Reinoso MD

## 2019-02-07 NOTE — CM/SW NOTE
Patient discharged on 2/6/19 as previously planned.        02/07/19 0800   Discharge disposition   Expected discharge disposition Home-Health   Name of Facillity/Home Care/Hospice Residential   Home services after discharge Skilled home care   Discharge tra

## 2019-02-09 NOTE — DISCHARGE SUMMARY
[facility]  Discharge Summary    Henny Augustin Patient Status:  Inpatient    1945 MRN [id number]   [facility] 3NE-A Attending No att. providers found   Hosp Day # 5 PCP Amparo Hampton MD     Date of Admission: 2019 Historical    HYDROcodone-acetaminophen  MG Oral Tab  Take 1 tablet by mouth every 6 (six) hours as needed.  , Historical, R-0    Metoclopramide HCl 10 MG Oral Tab  Take 10 mg by mouth 4 (four) times daily before meals and nightly.  , Historical    L

## 2019-02-12 NOTE — PAYOR COMM NOTE
--------------  DISCHARGE REVIEW    Payor: Argentina Rowley  Subscriber #:  BJORN MIRANDA Griffin  Authorization Number: [id number]    Admit date: 2/3/19  Admit time:  2232  Discharge Date: 2/6/2019  5:30 PM     Admitting Physician:  Edwin Vaca MD  Attending y °C)  Pulse:  [86-90] 86  Resp:  [18-20] 18  BP: (118-147)/(62-65) 147/62     Intake/Output:     Intake/Output Summary (Last 24 hours) at 2/6/2019 0947  Last data filed at 2/6/2019 0900      Gross per 24 hour   Intake 240 ml   Output —   Net 240 ml        combination tablet ([facility] only)   Oral Daily   • Metoclopramide HCl  10 mg Oral TID AC and HS   • multivitamin with minerals  1 tablet Oral Daily   • sodium chloride  3 mL Nebulization TID   • Oseltamivir Phosphate  30 mg Oral BID   • aztreonam  1 g Intraven Phosphate 30 MG Oral Cap  Take 1 capsule (30 mg total) by mouth 2 (two) times daily. Qty: 4 capsule Refills: 0      CONTINUE these medications which have NOT CHANGED    atorvastatin 10 MG Oral Tab  Take 10 mg by mouth nightly.     HYDROcodone-acetaminophen

## 2019-03-05 ENCOUNTER — CARDPULM VISIT (OUTPATIENT)
Dept: CARDIAC REHAB | Facility: HOSPITAL | Age: 74
End: 2019-03-05
Attending: INTERNAL MEDICINE
Payer: MEDICARE

## 2019-03-05 PROCEDURE — 94618 PULMONARY STRESS TESTING: CPT

## 2019-03-06 ENCOUNTER — CARDPULM VISIT (OUTPATIENT)
Dept: CARDIAC REHAB | Facility: HOSPITAL | Age: 74
End: 2019-03-06
Attending: INTERNAL MEDICINE
Payer: MEDICARE

## 2019-03-08 ENCOUNTER — CARDPULM VISIT (OUTPATIENT)
Dept: CARDIAC REHAB | Facility: HOSPITAL | Age: 74
End: 2019-03-08
Attending: INTERNAL MEDICINE
Payer: MEDICARE

## 2019-03-11 ENCOUNTER — CARDPULM VISIT (OUTPATIENT)
Dept: CARDIAC REHAB | Facility: HOSPITAL | Age: 74
End: 2019-03-11
Attending: INTERNAL MEDICINE
Payer: MEDICARE

## 2019-03-13 ENCOUNTER — CARDPULM VISIT (OUTPATIENT)
Dept: CARDIAC REHAB | Facility: HOSPITAL | Age: 74
End: 2019-03-13
Attending: INTERNAL MEDICINE
Payer: MEDICARE

## 2019-03-15 ENCOUNTER — CARDPULM VISIT (OUTPATIENT)
Dept: CARDIAC REHAB | Facility: HOSPITAL | Age: 74
End: 2019-03-15
Attending: INTERNAL MEDICINE
Payer: MEDICARE

## 2019-03-18 ENCOUNTER — CARDPULM VISIT (OUTPATIENT)
Dept: CARDIAC REHAB | Facility: HOSPITAL | Age: 74
End: 2019-03-18
Attending: INTERNAL MEDICINE
Payer: MEDICARE

## 2019-03-20 ENCOUNTER — CARDPULM VISIT (OUTPATIENT)
Dept: CARDIAC REHAB | Facility: HOSPITAL | Age: 74
End: 2019-03-20
Attending: INTERNAL MEDICINE
Payer: MEDICARE

## 2019-03-22 ENCOUNTER — CARDPULM VISIT (OUTPATIENT)
Dept: CARDIAC REHAB | Facility: HOSPITAL | Age: 74
End: 2019-03-22
Attending: INTERNAL MEDICINE
Payer: MEDICARE

## 2019-03-25 ENCOUNTER — CARDPULM VISIT (OUTPATIENT)
Dept: CARDIAC REHAB | Facility: HOSPITAL | Age: 74
End: 2019-03-25
Attending: INTERNAL MEDICINE
Payer: MEDICARE

## 2019-03-27 ENCOUNTER — CARDPULM VISIT (OUTPATIENT)
Dept: CARDIAC REHAB | Facility: HOSPITAL | Age: 74
End: 2019-03-27
Attending: INTERNAL MEDICINE
Payer: MEDICARE

## 2019-03-29 ENCOUNTER — CARDPULM VISIT (OUTPATIENT)
Dept: CARDIAC REHAB | Facility: HOSPITAL | Age: 74
End: 2019-03-29
Attending: INTERNAL MEDICINE
Payer: MEDICARE

## 2019-04-01 ENCOUNTER — CARDPULM VISIT (OUTPATIENT)
Dept: CARDIAC REHAB | Facility: HOSPITAL | Age: 74
End: 2019-04-01
Attending: INTERNAL MEDICINE
Payer: MEDICARE

## 2019-04-03 ENCOUNTER — CARDPULM VISIT (OUTPATIENT)
Dept: CARDIAC REHAB | Facility: HOSPITAL | Age: 74
End: 2019-04-03
Attending: INTERNAL MEDICINE
Payer: MEDICARE

## 2019-04-08 ENCOUNTER — CARDPULM VISIT (OUTPATIENT)
Dept: CARDIAC REHAB | Facility: HOSPITAL | Age: 74
End: 2019-04-08
Attending: INTERNAL MEDICINE
Payer: MEDICARE

## 2019-04-10 ENCOUNTER — CARDPULM VISIT (OUTPATIENT)
Dept: CARDIAC REHAB | Facility: HOSPITAL | Age: 74
End: 2019-04-10
Attending: INTERNAL MEDICINE
Payer: MEDICARE

## 2019-04-12 ENCOUNTER — CARDPULM VISIT (OUTPATIENT)
Dept: CARDIAC REHAB | Facility: HOSPITAL | Age: 74
End: 2019-04-12
Attending: INTERNAL MEDICINE
Payer: MEDICARE

## 2019-04-15 ENCOUNTER — APPOINTMENT (OUTPATIENT)
Dept: CARDIAC REHAB | Facility: HOSPITAL | Age: 74
End: 2019-04-15
Attending: INTERNAL MEDICINE
Payer: MEDICARE

## 2019-04-17 ENCOUNTER — CARDPULM VISIT (OUTPATIENT)
Dept: CARDIAC REHAB | Facility: HOSPITAL | Age: 74
End: 2019-04-17
Attending: INTERNAL MEDICINE
Payer: MEDICARE

## 2019-04-19 ENCOUNTER — CARDPULM VISIT (OUTPATIENT)
Dept: CARDIAC REHAB | Facility: HOSPITAL | Age: 74
End: 2019-04-19
Attending: INTERNAL MEDICINE
Payer: MEDICARE

## 2019-04-22 ENCOUNTER — CARDPULM VISIT (OUTPATIENT)
Dept: CARDIAC REHAB | Facility: HOSPITAL | Age: 74
End: 2019-04-22
Attending: INTERNAL MEDICINE
Payer: MEDICARE

## 2019-04-24 ENCOUNTER — CARDPULM VISIT (OUTPATIENT)
Dept: CARDIAC REHAB | Facility: HOSPITAL | Age: 74
End: 2019-04-24
Attending: INTERNAL MEDICINE
Payer: MEDICARE

## 2019-04-29 ENCOUNTER — APPOINTMENT (OUTPATIENT)
Dept: CARDIAC REHAB | Facility: HOSPITAL | Age: 74
End: 2019-04-29
Attending: INTERNAL MEDICINE
Payer: MEDICARE

## 2019-05-01 ENCOUNTER — APPOINTMENT (OUTPATIENT)
Dept: CARDIAC REHAB | Facility: HOSPITAL | Age: 74
End: 2019-05-01
Attending: INTERNAL MEDICINE
Payer: MEDICARE

## 2019-05-03 ENCOUNTER — CARDPULM VISIT (OUTPATIENT)
Dept: CARDIAC REHAB | Facility: HOSPITAL | Age: 74
End: 2019-05-03
Attending: INTERNAL MEDICINE
Payer: MEDICARE

## 2019-05-06 ENCOUNTER — CARDPULM VISIT (OUTPATIENT)
Dept: CARDIAC REHAB | Facility: HOSPITAL | Age: 74
End: 2019-05-06
Attending: INTERNAL MEDICINE
Payer: MEDICARE

## 2019-05-08 ENCOUNTER — CARDPULM VISIT (OUTPATIENT)
Dept: CARDIAC REHAB | Facility: HOSPITAL | Age: 74
End: 2019-05-08
Attending: INTERNAL MEDICINE
Payer: MEDICARE

## 2019-05-09 ENCOUNTER — CARDPULM VISIT (OUTPATIENT)
Dept: CARDIAC REHAB | Facility: HOSPITAL | Age: 74
End: 2019-05-09
Attending: INTERNAL MEDICINE
Payer: MEDICARE

## 2019-05-10 ENCOUNTER — APPOINTMENT (OUTPATIENT)
Dept: CARDIAC REHAB | Facility: HOSPITAL | Age: 74
End: 2019-05-10
Attending: INTERNAL MEDICINE
Payer: MEDICARE

## 2019-05-13 ENCOUNTER — CARDPULM VISIT (OUTPATIENT)
Dept: CARDIAC REHAB | Facility: HOSPITAL | Age: 74
End: 2019-05-13
Attending: INTERNAL MEDICINE
Payer: MEDICARE

## 2019-05-15 ENCOUNTER — CARDPULM VISIT (OUTPATIENT)
Dept: CARDIAC REHAB | Facility: HOSPITAL | Age: 74
End: 2019-05-15
Attending: INTERNAL MEDICINE
Payer: MEDICARE

## 2019-05-17 ENCOUNTER — CARDPULM VISIT (OUTPATIENT)
Dept: CARDIAC REHAB | Facility: HOSPITAL | Age: 74
End: 2019-05-17
Attending: INTERNAL MEDICINE
Payer: MEDICARE

## 2019-05-20 ENCOUNTER — CARDPULM VISIT (OUTPATIENT)
Dept: CARDIAC REHAB | Facility: HOSPITAL | Age: 74
End: 2019-05-20
Attending: INTERNAL MEDICINE
Payer: MEDICARE

## 2019-05-22 ENCOUNTER — CARDPULM VISIT (OUTPATIENT)
Dept: CARDIAC REHAB | Facility: HOSPITAL | Age: 74
End: 2019-05-22
Attending: INTERNAL MEDICINE
Payer: MEDICARE

## 2019-05-24 ENCOUNTER — CARDPULM VISIT (OUTPATIENT)
Dept: CARDIAC REHAB | Facility: HOSPITAL | Age: 74
End: 2019-05-24
Attending: INTERNAL MEDICINE
Payer: MEDICARE

## 2019-05-29 ENCOUNTER — CARDPULM VISIT (OUTPATIENT)
Dept: CARDIAC REHAB | Facility: HOSPITAL | Age: 74
End: 2019-05-29
Attending: INTERNAL MEDICINE
Payer: MEDICARE

## 2019-05-31 ENCOUNTER — CARDPULM VISIT (OUTPATIENT)
Dept: CARDIAC REHAB | Facility: HOSPITAL | Age: 74
End: 2019-05-31
Attending: INTERNAL MEDICINE
Payer: MEDICARE

## 2019-06-03 ENCOUNTER — CARDPULM VISIT (OUTPATIENT)
Dept: CARDIAC REHAB | Facility: HOSPITAL | Age: 74
End: 2019-06-03
Attending: INTERNAL MEDICINE
Payer: MEDICARE

## 2019-06-05 ENCOUNTER — CARDPULM VISIT (OUTPATIENT)
Dept: CARDIAC REHAB | Facility: HOSPITAL | Age: 74
End: 2019-06-05
Attending: INTERNAL MEDICINE
Payer: MEDICARE

## 2019-06-06 ENCOUNTER — HOSPITAL ENCOUNTER (OUTPATIENT)
Dept: GENERAL RADIOLOGY | Facility: HOSPITAL | Age: 74
Discharge: HOME | End: 2019-06-06
Attending: INTERNAL MEDICINE
Payer: MEDICARE

## 2019-06-06 DIAGNOSIS — J98.09 BRONCHIAL STENOSIS: ICD-10-CM

## 2019-06-06 DIAGNOSIS — R06.00 DYSPNEA: ICD-10-CM

## 2019-06-06 DIAGNOSIS — D86.9 SARCOIDOSIS: ICD-10-CM

## 2019-06-06 PROCEDURE — 71046 X-RAY EXAM CHEST 2 VIEWS: CPT | Performed by: INTERNAL MEDICINE

## 2019-06-07 ENCOUNTER — CARDPULM VISIT (OUTPATIENT)
Dept: CARDIAC REHAB | Facility: HOSPITAL | Age: 74
End: 2019-06-07
Attending: INTERNAL MEDICINE
Payer: MEDICARE

## 2019-06-11 ENCOUNTER — CARDPULM VISIT (OUTPATIENT)
Dept: CARDIAC REHAB | Facility: HOSPITAL | Age: 74
End: 2019-06-11
Attending: SPECIALIST

## 2019-06-11 ENCOUNTER — APPOINTMENT (OUTPATIENT)
Dept: CARDIAC REHAB | Facility: HOSPITAL | Age: 74
End: 2019-06-11
Attending: INTERNAL MEDICINE
Payer: MEDICARE

## 2019-09-20 ENCOUNTER — APPOINTMENT (OUTPATIENT)
Dept: GENERAL RADIOLOGY | Facility: HOSPITAL | Age: 74
DRG: 194 | End: 2019-09-20
Attending: EMERGENCY MEDICINE
Payer: MEDICARE

## 2019-09-20 ENCOUNTER — HOSPITAL ENCOUNTER (INPATIENT)
Facility: HOSPITAL | Age: 74
LOS: 4 days | Discharge: HOME HEALTH | DRG: 194 | End: 2019-09-24
Attending: EMERGENCY MEDICINE | Admitting: SPECIALIST
Payer: MEDICARE

## 2019-09-20 DIAGNOSIS — J18.9 COMMUNITY ACQUIRED PNEUMONIA OF RIGHT MIDDLE LOBE OF LUNG: Primary | ICD-10-CM

## 2019-09-20 PROCEDURE — 96367 TX/PROPH/DG ADDL SEQ IV INF: CPT

## 2019-09-20 PROCEDURE — 94667 MNPJ CHEST WALL 1ST: CPT

## 2019-09-20 PROCEDURE — 80053 COMPREHEN METABOLIC PANEL: CPT | Performed by: EMERGENCY MEDICINE

## 2019-09-20 PROCEDURE — 84132 ASSAY OF SERUM POTASSIUM: CPT | Performed by: SPECIALIST

## 2019-09-20 PROCEDURE — 99285 EMERGENCY DEPT VISIT HI MDM: CPT

## 2019-09-20 PROCEDURE — 36415 COLL VENOUS BLD VENIPUNCTURE: CPT

## 2019-09-20 PROCEDURE — 83880 ASSAY OF NATRIURETIC PEPTIDE: CPT | Performed by: EMERGENCY MEDICINE

## 2019-09-20 PROCEDURE — 87070 CULTURE OTHR SPECIMN AEROBIC: CPT | Performed by: INTERNAL MEDICINE

## 2019-09-20 PROCEDURE — 94640 AIRWAY INHALATION TREATMENT: CPT

## 2019-09-20 PROCEDURE — 87633 RESP VIRUS 12-25 TARGETS: CPT | Performed by: EMERGENCY MEDICINE

## 2019-09-20 PROCEDURE — 87486 CHLMYD PNEUM DNA AMP PROBE: CPT | Performed by: EMERGENCY MEDICINE

## 2019-09-20 PROCEDURE — 96375 TX/PRO/DX INJ NEW DRUG ADDON: CPT

## 2019-09-20 PROCEDURE — 96365 THER/PROPH/DIAG IV INF INIT: CPT

## 2019-09-20 PROCEDURE — 87040 BLOOD CULTURE FOR BACTERIA: CPT | Performed by: EMERGENCY MEDICINE

## 2019-09-20 PROCEDURE — 87999 UNLISTED MICROBIOLOGY PX: CPT

## 2019-09-20 PROCEDURE — 87798 DETECT AGENT NOS DNA AMP: CPT | Performed by: EMERGENCY MEDICINE

## 2019-09-20 PROCEDURE — 87581 M.PNEUMON DNA AMP PROBE: CPT | Performed by: EMERGENCY MEDICINE

## 2019-09-20 PROCEDURE — 96366 THER/PROPH/DIAG IV INF ADDON: CPT

## 2019-09-20 PROCEDURE — 71046 X-RAY EXAM CHEST 2 VIEWS: CPT | Performed by: EMERGENCY MEDICINE

## 2019-09-20 PROCEDURE — 87205 SMEAR GRAM STAIN: CPT | Performed by: INTERNAL MEDICINE

## 2019-09-20 PROCEDURE — 85025 COMPLETE CBC W/AUTO DIFF WBC: CPT | Performed by: EMERGENCY MEDICINE

## 2019-09-20 RX ORDER — IPRATROPIUM BROMIDE AND ALBUTEROL SULFATE 2.5; .5 MG/3ML; MG/3ML
3 SOLUTION RESPIRATORY (INHALATION) ONCE
Status: COMPLETED | OUTPATIENT
Start: 2019-09-20 | End: 2019-09-20

## 2019-09-20 RX ORDER — METOCLOPRAMIDE HYDROCHLORIDE 5 MG/ML
5 INJECTION INTRAMUSCULAR; INTRAVENOUS EVERY 8 HOURS PRN
Status: DISCONTINUED | OUTPATIENT
Start: 2019-09-20 | End: 2019-09-23

## 2019-09-20 RX ORDER — HYDROCODONE BITARTRATE AND ACETAMINOPHEN 10; 325 MG/1; MG/1
1 TABLET ORAL EVERY 4 HOURS PRN
Status: DISCONTINUED | OUTPATIENT
Start: 2019-09-20 | End: 2019-09-24

## 2019-09-20 RX ORDER — METHYLPREDNISOLONE SODIUM SUCCINATE 125 MG/2ML
125 INJECTION, POWDER, LYOPHILIZED, FOR SOLUTION INTRAMUSCULAR; INTRAVENOUS ONCE
Status: COMPLETED | OUTPATIENT
Start: 2019-09-20 | End: 2019-09-20

## 2019-09-20 RX ORDER — POTASSIUM CHLORIDE 14.9 MG/ML
20 INJECTION INTRAVENOUS ONCE
Status: COMPLETED | OUTPATIENT
Start: 2019-09-20 | End: 2019-09-20

## 2019-09-20 RX ORDER — HYDROCODONE BITARTRATE AND ACETAMINOPHEN 10; 325 MG/1; MG/1
2 TABLET ORAL EVERY 4 HOURS PRN
Status: DISCONTINUED | OUTPATIENT
Start: 2019-09-20 | End: 2019-09-24

## 2019-09-20 RX ORDER — BISACODYL 10 MG
10 SUPPOSITORY, RECTAL RECTAL
Status: DISCONTINUED | OUTPATIENT
Start: 2019-09-20 | End: 2019-09-24

## 2019-09-20 RX ORDER — DOCUSATE SODIUM 100 MG/1
100 CAPSULE, LIQUID FILLED ORAL 2 TIMES DAILY
Status: DISCONTINUED | OUTPATIENT
Start: 2019-09-20 | End: 2019-09-24

## 2019-09-20 RX ORDER — CEFDINIR 300 MG/1
300 CAPSULE ORAL 2 TIMES DAILY
Status: ON HOLD | COMMUNITY
Start: 2019-09-12 | End: 2019-09-21

## 2019-09-20 RX ORDER — METOCLOPRAMIDE 10 MG/1
10 TABLET ORAL
Status: DISCONTINUED | OUTPATIENT
Start: 2019-09-20 | End: 2019-09-24

## 2019-09-20 RX ORDER — ATORVASTATIN CALCIUM 10 MG/1
10 TABLET, FILM COATED ORAL NIGHTLY
Status: DISCONTINUED | OUTPATIENT
Start: 2019-09-20 | End: 2019-09-24

## 2019-09-20 RX ORDER — ZOLPIDEM TARTRATE 5 MG/1
5 TABLET ORAL NIGHTLY PRN
Status: DISCONTINUED | OUTPATIENT
Start: 2019-09-20 | End: 2019-09-24

## 2019-09-20 RX ORDER — POLYETHYLENE GLYCOL 3350 17 G/17G
17 POWDER, FOR SOLUTION ORAL DAILY PRN
Status: DISCONTINUED | OUTPATIENT
Start: 2019-09-20 | End: 2019-09-24

## 2019-09-20 RX ORDER — ACETAMINOPHEN 325 MG/1
650 TABLET ORAL EVERY 6 HOURS PRN
Status: DISCONTINUED | OUTPATIENT
Start: 2019-09-20 | End: 2019-09-24

## 2019-09-20 RX ORDER — HYDROCODONE BITARTRATE AND ACETAMINOPHEN 10; 325 MG/1; MG/1
1 TABLET ORAL EVERY 6 HOURS PRN
Status: DISCONTINUED | OUTPATIENT
Start: 2019-09-20 | End: 2019-09-20

## 2019-09-20 RX ORDER — ENOXAPARIN SODIUM 100 MG/ML
40 INJECTION SUBCUTANEOUS EVERY EVENING
Status: DISCONTINUED | OUTPATIENT
Start: 2019-09-20 | End: 2019-09-24

## 2019-09-20 RX ORDER — MULTIVITAMIN WITH FOLIC ACID 400 MCG
1 TABLET ORAL DAILY
COMMUNITY

## 2019-09-20 RX ORDER — ACETAMINOPHEN 325 MG/1
325 TABLET ORAL EVERY 6 HOURS PRN
Status: DISCONTINUED | OUTPATIENT
Start: 2019-09-20 | End: 2019-09-24

## 2019-09-20 RX ORDER — ONDANSETRON 2 MG/ML
4 INJECTION INTRAMUSCULAR; INTRAVENOUS EVERY 6 HOURS PRN
Status: DISCONTINUED | OUTPATIENT
Start: 2019-09-20 | End: 2019-09-24

## 2019-09-20 RX ORDER — SODIUM CHLORIDE 9 MG/ML
INJECTION, SOLUTION INTRAVENOUS CONTINUOUS
Status: DISCONTINUED | OUTPATIENT
Start: 2019-09-20 | End: 2019-09-24

## 2019-09-20 RX ORDER — METHYLPREDNISOLONE SODIUM SUCCINATE 40 MG/ML
40 INJECTION, POWDER, LYOPHILIZED, FOR SOLUTION INTRAMUSCULAR; INTRAVENOUS EVERY 8 HOURS
Status: COMPLETED | OUTPATIENT
Start: 2019-09-20 | End: 2019-09-23

## 2019-09-20 RX ORDER — SODIUM PHOSPHATE, DIBASIC AND SODIUM PHOSPHATE, MONOBASIC 7; 19 G/133ML; G/133ML
1 ENEMA RECTAL ONCE AS NEEDED
Status: DISCONTINUED | OUTPATIENT
Start: 2019-09-20 | End: 2019-09-24

## 2019-09-20 RX ORDER — IPRATROPIUM BROMIDE AND ALBUTEROL SULFATE 2.5; .5 MG/3ML; MG/3ML
3 SOLUTION RESPIRATORY (INHALATION)
Status: DISCONTINUED | OUTPATIENT
Start: 2019-09-20 | End: 2019-09-22

## 2019-09-20 NOTE — PROGRESS NOTES
Pt is a 68 y/o female admitted with SOB due to PNA. alert oriented. denies pain,fever,sob or N/V.on room air.02 sats 93-96%. still has mild audible wheezing. pt admitted from ER at 1430. recieved the abx and IV steroids in the ER. Pulm  At bedside. K2.9-replaced.

## 2019-09-20 NOTE — PROGRESS NOTES
Pharmacy renal dose adjustment for metoclopramide (Reglan)    Agustin Acosta has been prescribed metoclopramide 10 mg every 8 hours as needed for nausea/vomiting. Estimated Creatinine Clearance: 47.5 mL/min (based on SCr of 0.91 mg/dL).     The e

## 2019-09-20 NOTE — ED INITIAL ASSESSMENT (HPI)
Pt presents with cough, sorethroat, bilat ear pain, audible wheeze worsening over 1 week, pt slept sitting up last night r/t allyssa

## 2019-09-20 NOTE — ED PROVIDER NOTES
Patient Seen in: [facility] Emergency Department      History   Patient presents with:  Dyspnea IRIS SOB (respiratory)    Stated Complaint: shortness of breath for a week on abx    HPI    Patient presents to the emergency department shortshawn of jocelynn °F (36.6 °C) (Temporal)   Resp 17   Ht 162.6 cm (5' 4\")   Wt 93.4 kg   SpO2 95%   BMI 35.36 kg/m²         Physical Exam    Pleasant African American woman lying in the emergency department bed HEENT exam is within normal limits neck is supple lungs have d TECHNIQUE:  PA and lateral chest radiographs were obtained. PATIENT STATED HISTORY: (As transcribed by Technologist)  Patient was     diagnosed with sarcoidosis in 1988.   She is currently having a flare up     and is experiencing shortness of jocelynn specified.       Medications Prescribed:  Current Discharge Medication List                   Present on Admission  Date Reviewed: 8/2/2018          ICD-10-CM Noted POA    Community acquired pneumonia of right middle lobe of lung ([facility] [address] J18.1 9/20/2019 [place]

## 2019-09-20 NOTE — PLAN OF CARE
[facility] RESIDENCY PROGRAM  PROGRESS NOTE     8/24/2017  PCP: El Kelly MD     Assessment/Plan:   Mamie Witt is a 81 y.o. female DM2, HTN, HLD, hypothyroidism, osteoporosis, and hx of falls. Admitted to [facility] for sepsis secondary to CAP.      Sepsis secondary to CAP - SIRS 4/4 overnight, CT shows infiltrates suspicious for pneumonia and clinical indications of CAP, patient without LA or end organ dysfunction rules out severe sepsis, WBC improving  - MIVF 100mL/hr  - Vanc to cover gram positives, Zosyn to cover gram negatives and anaerobes  - Tylenol PRN for fever  - Duonebs PRN q6h  - Blood, urine and sputum cultures pending   - Troponin q6h x1  - Daily CBC     Generalized Weakness with Anorexia and Weight Loss - given concern for malignancy ordered a CT chest which showed multiple benign 4mm nodules throughout lungs  - FOBT as collected, patient states she has not ever had a colonoscopy  - PT/ OT consult  - Nutrition consult   - Strict I/O     Type II Diabetes - diet controlled, A1c 6 (9/27/16)  - Glucose checks q6h  - Regular diet as patient has not been eating and is very frail  - Nutrition consult      HTN - stable overnight  - Continue Losartan      HLD - , , HDL 41, LDL 47 (10/7/16)  - Continue Rosuvastatin      Hypothyroidism - POA TSH 0.35  - Continue Synthroid     Osteoporosis   - Hold Calcium    VTE prophylaxis: Heparin  Discussed plan of care with Patient/Family   Disposition:  Plan to D/C home. Subjective:   Pt was seen and examined at bedside. Patient states she is feeling better this morning. Denies chest pain, worsening shortness of breath, fever, chills, abdominal pain and states she is not coughing as much. She is still on 2L NC.     Objective:   Physical examination  Visit Vitals    BP 94/51 (BP 1 Location: Left arm, BP Patient Position: At rest)    Pulse 79    Temp 98.4 °F (36.9 °C)    Resp 22    Ht 5' 3\" (1.6 m)    Wt 122 lb (55.3 kg)    SpO2 Problem: Patient/Family Goals  Goal: Patient/Family Long Term Goal  Description  Patient's Long Term Goal: 9./20-resolve pna    Interventions:  - 9/20-iv abx  - See additional Care Plan goals for specific interventions   Outcome: Progressing  Goal: Patie 94%    BMI 21.61 kg/m2      Temp (24hrs), Av.2 °F (37.9 °C), Min:98.4 °F (36.9 °C), Max:101.6 °F (38.7 °C)     O2 Flow Rate (L/min): 2 l/min   O2 Device: Nasal cannula      Intake/Output Summary (Last 24 hours) at 17 0805  Last data filed at 17 0308   Gross per 24 hour   Intake                0 ml   Output              100 ml   Net             -100 ml     Last shift:       Last 3 shifts:    1901 - 700  In: -   Out: 100 [Urine:100]    General:   Alert, cooperative, no acute distress   Head:   Atraumatic   Eyes:   Conjunctivae clear   ENT:  Oral mucosa normal   Neck:  Supple, trachea midline, no adenopathy   No JVD   Back:    No CVA tenderness    Chest wall:    No tenderness or deformities    Lungs:   Diffuse wheezing throughout, rales in lower lobes bilaterally.    Heart:   Regular rhythm, no murmur   Abdomen:    Soft, non-tender   No masses or organomegaly    Extremities:  No edema or DVT signs   Pulses:  Symmetric all extremities   Skin:  Warm and dry    No rashes or lesions   Neurologic:  Oriented   No focal deficits     Data Review:     Recent Labs      17   2113   WBC  10.1  13.6*   HGB  11.1*  13.4   HCT  33.9*  40.7   PLT  129*  163     Recent Labs      173   NA  138  135*   K  3.6  3.8   CL  108  102   CO2  22  26   GLU  138*  149*   BUN  17  18   CREA  1.11*  1.24*   CA  7.4*  8.5   ALB   --   3.1*   TBILI   --   0.3   SGOT   --   42*   ALT   --   25     Medications reviewed  Current Facility-Administered Medications   Medication Dose Route Frequency    acetaminophen (TYLENOL) tablet 500 mg  500 mg Oral Q6H PRN    piperacillin-tazobactam (ZOSYN) 3.375 g in 0.9% sodium chloride (MBP/ADV) 100 mL  3.375 g IntraVENous Q8H    Vancomycin: pharmacy dosing by random level   Other Rx Dosing/Monitoring    albuterol 5mg / ipratropium 0.5mg neb solution  1 Dose Nebulization Q6H PRN    rosuvastatin (CRESTOR) tablet 20 mg  20 mg Oral goal  Description  INTERVENTIONS:  - Encourage pt to monitor pain and request assistance  - Assess pain using appropriate pain scale  - Administer analgesics based on type and severity of pain and evaluate response  - Implement non-pharmacological measures DAILY    pantoprazole (PROTONIX) tablet 40 mg  40 mg Oral ACB&D    levothyroxine (SYNTHROID) tablet 50 mcg  50 mcg Oral ACB    losartan (COZAAR) tablet 50 mg  50 mg Oral DAILY    aspirin delayed-release tablet 81 mg  81 mg Oral DAILY    sodium chloride (NS) flush 5-10 mL  5-10 mL IntraVENous PRN    sodium chloride (NS) flush 5-10 mL  5-10 mL IntraVENous Q8H    sodium chloride (NS) flush 5-10 mL  5-10 mL IntraVENous PRN    0.9% sodium chloride infusion  100 mL/hr IntraVENous CONTINUOUS    heparin (porcine) injection 5,000 Units  5,000 Units SubCUTAneous Q8H     Patient discussed with Dr. Bolivar Burt MD (PGY2)     Signed:   Regina Heck DO   Resident, Family Medicine      Attending note: Attending note to follow. ..

## 2019-09-20 NOTE — PROGRESS NOTES
Non Face to face encounter   Pt admitted with pneumonia and copd exc chart reviwed d/w with er and nurse orders written will see in am  pulm consult   iv abx and steroids and nebs and dvt proph  Time spent 30 mts     Marianna Porter md

## 2019-09-20 NOTE — CONSULTS
Ron Dove [address] [facility] [address] [facility]  Report of Consultation    Prema Dawkins Patient Status:  Emergency    1945 MRN [id number]   Location [address] [place] [address] A Procedure Laterality Date   • APPENDECTOMY     • HYSTERECTOMY     • MARIA LUZ BIOPSY STEREOTACTIC NODULE 2 SITE BILAT      2015   • TOTAL ABDOM HYSTERECTOMY       Family History   Problem Relation Age of Onset   • Ovarian Cancer Mother 68   • Breast Cancer Sis retention. Behavioral/Psych: Normal affect, mood, speech. No AVH, No SI/HI    All other review of systems are negative.     Vital signs in last 24 hours:  Patient Vitals for the past 24 hrs:   BP Temp Temp src Pulse Resp SpO2 Height Weight   09/20/19 1025 ABG:     No results for input(s): ABGPHT, MMMOVT3Y, EGNFB3N, ABGHCO3, ABGBE, TEMP, IMANI, SITE, DEV, THGB in the last 168 hours. Invalid input(s): OTH20UOJ, CHOB    Cultures:     No results found for this visit on 09/20/19.   No results for input(s): measures  · Monitor oximetry and may consider desaturation assessment prior to discharge;  Baseline is RA  · Repeat chest x-ray tomorrow  · Ambulate/OOBTC as tolerated    Thank you for the consultation. Will follow with you.     MD Tiffany Patton

## 2019-09-21 PROCEDURE — 94640 AIRWAY INHALATION TREATMENT: CPT

## 2019-09-21 PROCEDURE — 94664 DEMO&/EVAL PT USE INHALER: CPT

## 2019-09-21 PROCEDURE — 85025 COMPLETE CBC W/AUTO DIFF WBC: CPT | Performed by: SPECIALIST

## 2019-09-21 PROCEDURE — 80048 BASIC METABOLIC PNL TOTAL CA: CPT | Performed by: SPECIALIST

## 2019-09-21 NOTE — PROGRESS NOTES
[facility]  Progress Note    Desean Yokasta Patient Status:  Inpatient    1945 MRN [id number]   [facility] 5NW-A Attending Zeferino Griffith MD   Hosp Day # 1 PCP Jhony Mitchell MD     Subjective:  Desean Templeton is input(s): CKTOTAL, TROPONINI, TROPONINT, CKMBINDEX    Cultures:  RVP negative  Blood pending  Sputum pending    Radiology:  CXR 9/21- RLL infiltrates, right diaphragm blunting- possible effusion    Medications Reviewed:    Current Facility-Administered Med Assessment:  1. Exacerbation of COPD/sarcoidosis due to community acquired pneumonia  2. RLL community acquired pneumonia  3. Hx of sarcoidosis c/b obstructive lung disease and ALEXI/BI bronchial stenosis s/p metallic stent at [facility]  4.  Mild bronch

## 2019-09-21 NOTE — PLAN OF CARE
Problem: Patient/Family Goals  Goal: Patient/Family Long Term Goal  Description  Patient's Long Term Goal: 9./20-resolve pna      Interventions:  - 9/20-iv abx  - See additional Care Plan goals for specific interventions    Outcome: Progressing  Goal: Pa level or patient's stated pain goal  Description  INTERVENTIONS:  - Encourage pt to monitor pain and request assistance  - Assess pain using appropriate pain scale  - Administer analgesics based on type and severity of pain and evaluate response  - Impleme

## 2019-09-21 NOTE — RESPIRATORY THERAPY NOTE
Patient received on 1 liter nasal canula. bs wheezes throughout, neb treatments given as ordered. All vital signs stable.  Will cont to monitor

## 2019-09-21 NOTE — PLAN OF CARE
Problem: pneumonia  Assessment: AOX4. Oxygen saturation >90% on room air. Very dyspneic with exertion. Vital signs stable. Afebrile. Tolerating diet. Voiding. Ambulates with SB assistance. Reporting pain under right ribcage- see MAR.      Problem: Patient/F injury  - Provide assistive devices as appropriate  - Consider OT/PT consult to assist with strengthening/mobility  - Encourage toileting schedule  Outcome: Progressing     Problem: PAIN - ADULT  Goal: Verbalizes/displays adequate comfort level or patient'

## 2019-09-22 PROCEDURE — 94664 DEMO&/EVAL PT USE INHALER: CPT

## 2019-09-22 PROCEDURE — 94640 AIRWAY INHALATION TREATMENT: CPT

## 2019-09-22 PROCEDURE — 94668 MNPJ CHEST WALL SBSQ: CPT

## 2019-09-22 RX ORDER — IPRATROPIUM BROMIDE AND ALBUTEROL SULFATE 2.5; .5 MG/3ML; MG/3ML
3 SOLUTION RESPIRATORY (INHALATION)
Status: DISCONTINUED | OUTPATIENT
Start: 2019-09-22 | End: 2019-09-24

## 2019-09-22 RX ORDER — PREDNISONE 20 MG/1
40 TABLET ORAL
Status: DISCONTINUED | OUTPATIENT
Start: 2019-09-23 | End: 2019-09-24

## 2019-09-22 NOTE — PLAN OF CARE
Problem: pneumonia  Assessment: AOX4. Oxygen saturation >90% on room air. dyspneic with exertion but improved from yesterday. Vital signs stable. Afebrile. Tolerating diet. Voiding. Ambulates with SB assistance. Reporting pain under right ribcage- see MAR. based on assessment  - Modify environment to reduce risk of injury  - Provide assistive devices as appropriate  - Consider OT/PT consult to assist with strengthening/mobility  - Encourage toileting schedule  Outcome: Progressing     Problem: PAIN - ADULT

## 2019-09-22 NOTE — PROGRESS NOTES
[facility]  Progress Note    Uyen Romo Patient Status:  Inpatient    1945 MRN [id number]   [facility] 5NW-A Attending Tim Slater MD   Hosp Day # 2 PCP Bryson Gosselin, MD         SUBJECTIVE:  Subjective:  Jerardo Muri ipratropium-albuterol  3 mL Nebulization 6 times per day   • azithromycin  500 mg Intravenous Q24H   • cefTRIAXone  1 g Intravenous Q24H   • MethylPREDNISolone Sodium Succ  40 mg Intravenous Q8H   • atorvastatin  10 mg Oral Nightly   • Fluticasone Furoate-

## 2019-09-22 NOTE — PROGRESS NOTES
[facility]  Progress Note    Ashley Corona Patient Status:  Inpatient    1945 MRN [id number]   [facility] 5NW-A Attending Mildred Lott MD   Hosp Day # 2 PCP Suzy Coffman MD     Subjective:  Ashley Corona is hours.    Invalid input(s): QTH61SRP, CHOB    Invalid input(s): CKTOTAL, TROPONINI, TROPONINT, CKMBINDEX    Cultures:  RVP negative  Blood NGTD  Sputum- normal dwayne    Radiology:  CXR 9/20- RLL infiltrates, right diaphragm blunting- possible effusion    M (REGLAN) injection 5 mg 5 mg Intravenous Q8H PRN       Assessment:  1. Exacerbation of COPD/sarcoidosis due to community acquired pneumonia  2. RLL community acquired pneumonia  3.  Hx of sarcoidosis c/b obstructive lung disease and ALEXI/BI bronchial stenosi

## 2019-09-23 ENCOUNTER — APPOINTMENT (OUTPATIENT)
Dept: GENERAL RADIOLOGY | Facility: HOSPITAL | Age: 74
DRG: 194 | End: 2019-09-23
Attending: INTERNAL MEDICINE
Payer: MEDICARE

## 2019-09-23 PROCEDURE — 94668 MNPJ CHEST WALL SBSQ: CPT

## 2019-09-23 PROCEDURE — 97116 GAIT TRAINING THERAPY: CPT

## 2019-09-23 PROCEDURE — 97161 PT EVAL LOW COMPLEX 20 MIN: CPT

## 2019-09-23 PROCEDURE — 97165 OT EVAL LOW COMPLEX 30 MIN: CPT

## 2019-09-23 PROCEDURE — 97530 THERAPEUTIC ACTIVITIES: CPT

## 2019-09-23 PROCEDURE — 71045 X-RAY EXAM CHEST 1 VIEW: CPT | Performed by: INTERNAL MEDICINE

## 2019-09-23 PROCEDURE — 94664 DEMO&/EVAL PT USE INHALER: CPT

## 2019-09-23 PROCEDURE — 94640 AIRWAY INHALATION TREATMENT: CPT

## 2019-09-23 NOTE — OCCUPATIONAL THERAPY NOTE
OCCUPATIONAL THERAPY QUICK EVALUATION - INPATIENT    Room Number: 951/154-C  Evaluation Date: 9/23/2019     Type of Evaluation: Quick Eval  Presenting Problem: Aspiration Pneumonia    Physician Order: IP Consult to Occupational Therapy  Reason for Therapy: OCCUPATIONAL PROFILE    HOME SITUATION  Type of Home: [facility]  Home Layout: Two level  Lives With: Daughter(who works during the day)    Toilet and Equipment: Standard height toilet  Shower/Tub and Equipment: Tub-shower combo  Other Equipment: Reac 0%  Standardized Score (AM-PAC Scale): 57.54  CMS Modifier (G-Code): CH    FUNCTIONAL TRANSFER ASSESSMENT  Supine to Sit : Not tested  Sit to Stand: Modified independent    Skilled Therapy Provided: Pt received reclined in bed.  Pt instructed in bed mobilit Home  OT Device Recommendations: None    PLAN   Patient has been evaluated and presents with no skilled Occupational Therapy needs at this time. Patient discharged from Occupational Therapy services.   Please re-order if a new functional limitation present

## 2019-09-23 NOTE — PROGRESS NOTES
[facility]  Progress Note    Donell Bosworth Patient Status:  Inpatient    1945 MRN [id number]   [facility] 5NW-A Attending Kalina Guillen MD   Hosp Day # 3 PCP Zachary Liang MD     Subjective:  Donell Bosworth is ALKPHO 94  --   --    AST 14*  --   --    ALT 16  --   --    BILT 0.6  --   --    TP 7.9  --   --        No results for input(s): MG in the last 168 hours.     Lab Results   Component Value Date    PHOS 3.7 02/05/2019        No results for input(s): PT, I Daily PRN   bisacodyl (DULCOLAX) rectal suppository 10 mg 10 mg Rectal Daily PRN   FLEET ENEMA (FLEET) 7-19 GM/118ML enema 133 mL 1 enema Rectal Once PRN   ondansetron HCl (ZOFRAN) injection 4 mg 4 mg Intravenous Q6H PRN   Metoclopramide HCl (REGLAN) injec

## 2019-09-23 NOTE — H&P
[facility]    PATIENT'S NAME: Mike Robison   ATTENDING PHYSICIAN: Paige Jama M.D.    PATIENT ACCOUNT#:   [de-identified]    LOCATION:  [address] [place] [address]  MEDICAL RECORD #:   [id number]       YOB: 1945  ADMISSION DATE:       09/2 home in a day or so on oral antibiotics and oral steroid.     Dictated By Keke Jain M.D.  d: 09/21/2019 19:16:16  t: 09/21/2019 19:51:09  [facility] [id number]  /

## 2019-09-23 NOTE — CM/SW NOTE
MSW made a referral to [id number] per MD order and recommendation of PT. Liaison will meet with the patient at bedside to explain services, provide choice, and financial disclosure.       Giselle Thomas, DANA

## 2019-09-23 NOTE — PLAN OF CARE
Received patient A/O x4, VSS with complaints of pain. PRN norco administered with good relief. Patient is up to the bathroom with a steady gait, voiding freely. Continue IV fluids and antibiotics.  Plan to switch to Po steroids this AM. Call light within re

## 2019-09-23 NOTE — PLAN OF CARE
Problem: Patient/Family Goals  Goal: Patient/Family Long Term Goal  Description  Patient's Long Term Goal: 9./20-resolve pna      Interventions:  - 9/20-iv abx  - See additional Care Plan goals for specific interventions    Outcome: Progressing  Goal: Pa PAIN - ADULT  Goal: Verbalizes/displays adequate comfort level or patient's stated pain goal  Description  INTERVENTIONS:  - Encourage pt to monitor pain and request assistance  - Assess pain using appropriate pain scale  - Administer analgesics based on t

## 2019-09-23 NOTE — PHYSICAL THERAPY NOTE
PHYSICAL THERAPY QUICK EVALUATION - INPATIENT    Room Number: 409/185-D  Evaluation Date: 9/23/2019  Presenting Problem: pneumonia  Physician Order: PT Eval and Treat    Problem List  Principal Problem:    Community acquired pneumonia of right middle lob difficulty does the patient currently have. ..  -   Turning over in bed (including adjusting bedclothes, sheets and blankets)?: None   -   Sitting down on and standing up from a chair with arms (e.g., wheelchair, bedside commode, etc.): None   -   Moving fr Functional outcome measures completed include AMPAC. Based on this evaluation, patient's clinical presentation is stable and overall evaluation complexity is considered low.   PT Discharge Recommendations: Home with home health PT    PLAN  Patient currentl

## 2019-09-23 NOTE — PROGRESS NOTES
Problem:   PNA     Data:  Alert and oriented. Room air. . Nebs. Productive cough. IS. Up independent. PO steroids. IV abx, Lovenox. Action: Keep patient comfortable and continue to monitor every 2 hours. Education: POC for the day.     Response:

## 2019-09-23 NOTE — PROGRESS NOTES
[facility]  Progress Note    Kelsie Child Patient Status:  Inpatient    1945 MRN [id number]   [facility] 5NW-A Attending Edwin Vaca MD   Hosp Day # 3 PCP Leonardo Castro MD         SUBJECTIVE:  Subjective:  Kalen De La Cruz Lab 09/20/19  1126   ALT 16   AST 14*   ALB 3.7       No results for input(s): PGLU in the last 168 hours. No results for input(s): URINE, CULTI, BLDSMR in the last 168 hours.             Meds:     • ipratropium-albuterol  3 mL Nebulization 6 times per

## 2019-09-24 VITALS
OXYGEN SATURATION: 94 % | HEART RATE: 85 BPM | RESPIRATION RATE: 20 BRPM | DIASTOLIC BLOOD PRESSURE: 74 MMHG | BODY MASS INDEX: 38.98 KG/M2 | HEIGHT: 64 IN | SYSTOLIC BLOOD PRESSURE: 150 MMHG | WEIGHT: 228.31 LBS | TEMPERATURE: 98 F

## 2019-09-24 PROCEDURE — 94640 AIRWAY INHALATION TREATMENT: CPT

## 2019-09-24 RX ORDER — PREDNISONE 10 MG/1
TABLET ORAL
Qty: 22 TABLET | Refills: 0 | Status: ON HOLD | OUTPATIENT
Start: 2019-09-24 | End: 2020-01-10

## 2019-09-24 NOTE — CM/SW NOTE
MD orders received for d/c today. MIMI spoke with [place] [address] liaison, Carlos Fritz who was made aware of anticipated discharge. Discharge summary sent to [facility] via [facility]. / to remain available for support and/or discharge planning.

## 2019-09-24 NOTE — PLAN OF CARE
Problem: Patient/Family Goals  Goal: Patient/Family Short Term Goal  Description  Patient's Short Term Goal: 9/20-resolve pna  9/20 noc: \"stop wheezing\"  9/22am- increase activity tolerance  9/22 noc: to sleep  9/23 nocs: go home  Interventions:   - 9/ using appropriate pain scale  - Administer analgesics based on type and severity of pain and evaluate response  - Implement non-pharmacological measures as appropriate and evaluate response  - Consider cultural and social influences on pain and pain manage

## 2019-09-24 NOTE — PROGRESS NOTES
[facility]  Progress Note    Jenny Tejada Patient Status:  Inpatient    1945 MRN [id number]   [facility] 5NW-A Attending Reynaldo Dumont MD   [facility] Day # 4 PCP Harleen Mcnally MD     Subjective:  Jenny Tejada is ABGBE, TEMP, IMANI, SITE, DEV, THGB in the last 168 hours.     Invalid input(s): IME25PGH, CHOB    Invalid input(s): CKTOTAL, TROPONINI, TROPONINT, CKMBINDEX    Cultures:  RVP negative  Blood NGTD  Sputum- normal dwayne    Radiology:  None new    Medications lung   5. 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 H/o massive hemoptysis 7/2017 in [place] - refused bronchoscopy and has continued to refuse bronchoscopy to evaluate the bronchial stenosis to date.     Plan:  · Continue abx for CAP -

## 2019-09-25 NOTE — PAYOR COMM NOTE
--------------  ADMISSION REVIEW     Payor: Argentina Rowley  Subscriber #Orvis Mikey  Authorization Number: [id number]    Admit date: 9/20/19  Admit time: 1656       Admitting Physician:  Edwin Vaca MD  Attending Physician:  No att. providers found  Pr Constitutional and vital signs reviewed. All other systems reviewed and negative except as noted above.     Physical Exam     ED Triage Vitals   BP 09/20/19 1025 147/75   Pulse 09/20/19 1025 91   Resp 09/20/19 1025 19   Temp 09/20/19 1025 97.8 °F (36.6 TECHNIQUE:  PA and lateral chest radiographs were obtained. PATIENT STATED HISTORY: (As transcribed by Technologist)  Patient was     diagnosed with sarcoidosis in 1988. She is currently having a flare up     and is experiencing shortness of breath. H&P signed by Tim Slater MD at 9/24/2019  9:00 AM      Author: Tim Slater MD Service:  Internal Medicine Author Type:  Physician    Filed:  9/24/2019  9:00 AM Status:  Signed    :   Tim Slater MD (Physician)         [address].   Chronic obstructive pulmonary disease exacerbation. 4.   Hypertension. 5.   Chronic pain syndrome. PLAN:  A day or so of  IV antibiotic, IV steroid, pulmonary consult, DVT prophylaxis.   She should be able to go home in a day or so on oral antibio Freq: Once Route: IV  Last Dose: Stopped (09/20/19 1415)  Start: 09/20/19 1200 End: 09/20/19 1415    Order specific questions:   What infection is this being used to treat?  Stat/one time dose    1313-New Bag [C]   1320-Handoff   1415-Stopped             Ce Start: 09/20/19 1515 End: 09/22/19 0955    Order specific questions:    Which hospital [facility]  Method of delivery: Intermittent nebulizer    1527-Given   1757-Given   2117-Given          0740-Given   1230-Given   1505-Given   1754-Given   2101-Given Medications 09/20/19 09/21/19   0.9% NaCl infusion   Rate: 100 mL/hr Freq: Continuous Route: IV  Start: 09/20/19 1700 End: 09/24/19 0841    1721-New Bag              0255-New Bag                          Medications 09/20/19 09/21/19   acetaminophen (Mykel Shove

## 2019-09-25 NOTE — CM/SW NOTE
09/25/19 0929   Discharge disposition   Expected discharge disposition Home-Health   Name of Facillity/Home Care/Hospice   ([facility])

## 2019-09-27 NOTE — PAYOR COMM NOTE
--------------  CONTINUED STAY REVIEW    Payor: David Varghese  Subscriber #:  BJORN MIRANDA [place]  Authorization Number: [id number]    Admit date: 9/20/19  Admit time: 1656    Admitting Physician:  Lola Chiang MD  Attending Physician:  No att. providers found  P Resp:  [18-22] 22  BP: (133-153)/(63-69) 153/69    Plan:  Continue present management,  Exacerbation of COPD/sarcoidosis due to community acquired pneumonia-abt/duoneb/ iv steroids/ pulm f/u     hx of sarcoidosis c/b obstructive lung disease and ALEXI/BI bro SpO2: 94% 94% 93% 98%        Plan:  · Continue abx for CAP - azithromycin (i5qucsq - completed) and ceftriaxone day 4 of 5  · Viral panel negative, sputum negative  · Continue PO steroid taper  · Continue bronchodilators and airway clearance measures  · Mo Last Dose: Stopped (09/20/19 1415)  Start: 09/20/19 1200 End: 09/20/19 1415    Order specific questions:   What infection is this being used to treat?  Stat/one time dose          CefTRIAXone Sodium (ROCEPHIN) 1 g in sodium chloride 0.9% 100 mL MBP/ADD-vant 1135-Given   1546-Given     2005-Given   2337-Given      0406-Given   0822-Given     1138-Given      1906-D/C'd        ipratropium-albuterol (DUONEB) nebulizer solution 3 mL   Dose: 3 mL  Freq: Every 4 hours while awake (RT) - 5 times daily Route: [employer] Start: 09/20/19 1445 End: 09/20/19 1905          Followed by  potassium chloride IVPB premix 20 mEq   Dose: 20 mEq  Freq:  Once Route: IV  Last Dose: 20 mEq (09/20/19 1833)  Start: 09/20/19 1839 End: 09/20/19 2033          predniSONE (DELTASONE) tab 40 mg 1824-Given     2249-Given        0415-Given   0951-Given     1906-D/C'd              FOR FURTHER REVIEW AND RECONSIDERATION OF INPT ADMISSION X 4 DAYS    PLEASE RESCIND THE ISSUED DENIAL AND APPROVE ALL 4 INPT DAYS

## 2019-10-20 NOTE — DISCHARGE SUMMARY
[facility]  Discharge Summary    La Nena Simon Patient Status:  Inpatient    1945 MRN [id number]   [facility] 5NW-A Attending No att. providers found   Hosp Day # 4 PCP Keke Jain MD     Date of Admission: 2019 (TAB-A-BELLA) Oral Tab  Take 1 tablet by mouth daily. , Historical    albuterol sulfate (2.5 MG/3ML) 0.083% Inhalation Nebu Soln  Take 3 mL (2.5 mg total) by nebulization 4 (four) times daily. , Normal, Disp-120 vial, R-2    atorvastatin 10 MG Oral Tab  Take

## 2019-12-27 ENCOUNTER — HOSPITAL ENCOUNTER (OUTPATIENT)
Dept: GENERAL RADIOLOGY | Facility: HOSPITAL | Age: 74
Discharge: HOME | End: 2019-12-27
Attending: INTERNAL MEDICINE
Payer: MEDICARE

## 2019-12-27 DIAGNOSIS — R06.00 DYSPNEA: ICD-10-CM

## 2019-12-27 DIAGNOSIS — D86.9 SARCOIDOSIS: ICD-10-CM

## 2019-12-27 DIAGNOSIS — R91.8 PULMONARY INFILTRATE: ICD-10-CM

## 2019-12-27 DIAGNOSIS — J44.9 COLD (CHRONIC OBSTRUCTIVE LUNG DISEASE) (HCC): ICD-10-CM

## 2019-12-27 PROCEDURE — 71046 X-RAY EXAM CHEST 2 VIEWS: CPT | Performed by: INTERNAL MEDICINE

## 2020-01-09 ENCOUNTER — APPOINTMENT (OUTPATIENT)
Dept: GENERAL RADIOLOGY | Facility: HOSPITAL | Age: 75
DRG: 190 | End: 2020-01-09
Attending: EMERGENCY MEDICINE
Payer: MEDICARE

## 2020-01-09 ENCOUNTER — LAB ENCOUNTER (OUTPATIENT)
Dept: LAB | Age: 75
DRG: 190 | End: 2020-01-09
Attending: INTERNAL MEDICINE
Payer: MEDICARE

## 2020-01-09 ENCOUNTER — HOSPITAL ENCOUNTER (INPATIENT)
Facility: HOSPITAL | Age: 75
LOS: 3 days | Discharge: HOME HEALTH | DRG: 190 | End: 2020-01-12
Attending: EMERGENCY MEDICINE | Admitting: SPECIALIST
Payer: MEDICARE

## 2020-01-09 DIAGNOSIS — J44.1 COPD EXACERBATION (HCC): ICD-10-CM

## 2020-01-09 DIAGNOSIS — D86.9 SARCOIDOSIS: ICD-10-CM

## 2020-01-09 DIAGNOSIS — J44.9 OBSTRUCTIVE CHRONIC BRONCHITIS WITHOUT EXACERBATION (HCC): Primary | ICD-10-CM

## 2020-01-09 DIAGNOSIS — R91.8 LUNG MASS: ICD-10-CM

## 2020-01-09 DIAGNOSIS — J18.9 COMMUNITY ACQUIRED PNEUMONIA OF RIGHT MIDDLE LOBE OF LUNG: Primary | ICD-10-CM

## 2020-01-09 LAB
ALBUMIN SERPL-MCNC: 3.5 G/DL (ref 3.4–5)
ALBUMIN/GLOB SERPL: 0.8 {RATIO} (ref 1–2)
ALP LIVER SERPL-CCNC: 108 U/L (ref 55–142)
ALT SERPL-CCNC: 19 U/L (ref 13–56)
ANION GAP SERPL CALC-SCNC: 4 MMOL/L (ref 0–18)
AST SERPL-CCNC: 21 U/L (ref 15–37)
BASOPHILS # BLD AUTO: 0.05 X10(3) UL (ref 0–0.2)
BASOPHILS NFR BLD AUTO: 0.5 %
BILIRUB SERPL-MCNC: 0.3 MG/DL (ref 0.1–2)
BUN BLD-MCNC: 8 MG/DL (ref 7–18)
BUN/CREAT SERPL: 8.5 (ref 10–20)
CALCIUM BLD-MCNC: 9.1 MG/DL (ref 8.5–10.1)
CHLORIDE SERPL-SCNC: 109 MMOL/L (ref 98–112)
CO2 SERPL-SCNC: 29 MMOL/L (ref 21–32)
CREAT BLD-MCNC: 0.94 MG/DL (ref 0.55–1.02)
DEPRECATED RDW RBC AUTO: 41.5 FL (ref 35.1–46.3)
EOSINOPHIL # BLD AUTO: 0.23 X10(3) UL (ref 0–0.7)
EOSINOPHIL NFR BLD AUTO: 2.2 %
ERYTHROCYTE [DISTWIDTH] IN BLOOD BY AUTOMATED COUNT: 12.6 % (ref 11–15)
GLOBULIN PLAS-MCNC: 4.4 G/DL (ref 2.8–4.4)
GLUCOSE BLD-MCNC: 138 MG/DL (ref 70–99)
HCT VFR BLD AUTO: 36.5 % (ref 35–48)
HGB BLD-MCNC: 11.5 G/DL (ref 12–16)
IMM GRANULOCYTES # BLD AUTO: 0.02 X10(3) UL (ref 0–1)
IMM GRANULOCYTES NFR BLD: 0.2 %
LYMPHOCYTES # BLD AUTO: 5.14 X10(3) UL (ref 1–4)
LYMPHOCYTES NFR BLD AUTO: 50.2 %
M PROTEIN MFR SERPL ELPH: 7.9 G/DL (ref 6.4–8.2)
MCH RBC QN AUTO: 28.3 PG (ref 26–34)
MCHC RBC AUTO-ENTMCNC: 31.5 G/DL (ref 31–37)
MCV RBC AUTO: 89.7 FL (ref 80–100)
MONOCYTES # BLD AUTO: 0.92 X10(3) UL (ref 0.1–1)
MONOCYTES NFR BLD AUTO: 9 %
NEUTROPHILS # BLD AUTO: 3.87 X10 (3) UL (ref 1.5–7.7)
NEUTROPHILS # BLD AUTO: 3.87 X10(3) UL (ref 1.5–7.7)
NEUTROPHILS NFR BLD AUTO: 37.9 %
OSMOLALITY SERPL CALC.SUM OF ELEC: 295 MOSM/KG (ref 275–295)
PLATELET # BLD AUTO: 284 10(3)UL (ref 150–450)
POTASSIUM SERPL-SCNC: 3.6 MMOL/L (ref 3.5–5.1)
RBC # BLD AUTO: 4.07 X10(6)UL (ref 3.8–5.3)
SODIUM SERPL-SCNC: 142 MMOL/L (ref 136–145)
TROPONIN I SERPL-MCNC: <0.045 NG/ML (ref ?–0.04)
WBC # BLD AUTO: 10.2 X10(3) UL (ref 4–11)

## 2020-01-09 PROCEDURE — 87070 CULTURE OTHR SPECIMN AEROBIC: CPT

## 2020-01-09 PROCEDURE — 99291 CRITICAL CARE FIRST HOUR: CPT

## 2020-01-09 PROCEDURE — 96365 THER/PROPH/DIAG IV INF INIT: CPT

## 2020-01-09 PROCEDURE — 87205 SMEAR GRAM STAIN: CPT

## 2020-01-09 PROCEDURE — 83880 ASSAY OF NATRIURETIC PEPTIDE: CPT | Performed by: INTERNAL MEDICINE

## 2020-01-09 PROCEDURE — 94640 AIRWAY INHALATION TREATMENT: CPT

## 2020-01-09 PROCEDURE — 84145 PROCALCITONIN (PCT): CPT | Performed by: INTERNAL MEDICINE

## 2020-01-09 PROCEDURE — 96375 TX/PRO/DX INJ NEW DRUG ADDON: CPT

## 2020-01-09 PROCEDURE — 84484 ASSAY OF TROPONIN QUANT: CPT | Performed by: EMERGENCY MEDICINE

## 2020-01-09 PROCEDURE — 80053 COMPREHEN METABOLIC PANEL: CPT | Performed by: EMERGENCY MEDICINE

## 2020-01-09 PROCEDURE — 93010 ELECTROCARDIOGRAM REPORT: CPT

## 2020-01-09 PROCEDURE — 86140 C-REACTIVE PROTEIN: CPT | Performed by: INTERNAL MEDICINE

## 2020-01-09 PROCEDURE — 85025 COMPLETE CBC W/AUTO DIFF WBC: CPT | Performed by: EMERGENCY MEDICINE

## 2020-01-09 PROCEDURE — 93005 ELECTROCARDIOGRAM TRACING: CPT

## 2020-01-09 PROCEDURE — 94644 CONT INHLJ TX 1ST HOUR: CPT

## 2020-01-09 PROCEDURE — 71045 X-RAY EXAM CHEST 1 VIEW: CPT | Performed by: EMERGENCY MEDICINE

## 2020-01-09 RX ORDER — HYDROMORPHONE HYDROCHLORIDE 1 MG/ML
0.5 INJECTION, SOLUTION INTRAMUSCULAR; INTRAVENOUS; SUBCUTANEOUS EVERY 30 MIN PRN
Status: ACTIVE | OUTPATIENT
Start: 2020-01-09 | End: 2020-01-10

## 2020-01-09 RX ORDER — PREDNISONE 20 MG/1
60 TABLET ORAL ONCE
Status: COMPLETED | OUTPATIENT
Start: 2020-01-09 | End: 2020-01-09

## 2020-01-09 RX ORDER — IPRATROPIUM BROMIDE AND ALBUTEROL SULFATE 2.5; .5 MG/3ML; MG/3ML
SOLUTION RESPIRATORY (INHALATION)
Status: COMPLETED
Start: 2020-01-09 | End: 2020-01-09

## 2020-01-09 RX ORDER — ONDANSETRON 2 MG/ML
4 INJECTION INTRAMUSCULAR; INTRAVENOUS EVERY 4 HOURS PRN
Status: DISCONTINUED | OUTPATIENT
Start: 2020-01-09 | End: 2020-01-12

## 2020-01-10 ENCOUNTER — APPOINTMENT (OUTPATIENT)
Dept: CT IMAGING | Facility: HOSPITAL | Age: 75
DRG: 190 | End: 2020-01-10
Attending: INTERNAL MEDICINE
Payer: MEDICARE

## 2020-01-10 LAB
ADENOVIRUS PCR:: NEGATIVE
ATRIAL RATE: 93 BPM
B PERT DNA SPEC QL NAA+PROBE: NEGATIVE
C PNEUM DNA SPEC QL NAA+PROBE: NEGATIVE
CORONAVIRUS 229E PCR:: NEGATIVE
CORONAVIRUS HKU1 PCR:: NEGATIVE
CORONAVIRUS NL63 PCR:: NEGATIVE
CORONAVIRUS OC43 PCR:: NEGATIVE
CRP SERPL-MCNC: 2.02 MG/DL (ref ?–0.3)
FLUAV RNA SPEC QL NAA+PROBE: NEGATIVE
FLUBV RNA SPEC QL NAA+PROBE: NEGATIVE
METAPNEUMOVIRUS PCR:: NEGATIVE
MYCOPLASMA PNEUMONIA PCR:: NEGATIVE
NT-PROBNP SERPL-MCNC: 69 PG/ML (ref ?–125)
P AXIS: 36 DEGREES
P-R INTERVAL: 164 MS
PARAINFLUENZA 1 PCR:: NEGATIVE
PARAINFLUENZA 2 PCR:: NEGATIVE
PARAINFLUENZA 3 PCR:: NEGATIVE
PARAINFLUENZA 4 PCR:: NEGATIVE
POTASSIUM SERPL-SCNC: 4.5 MMOL/L (ref 3.5–5.1)
PROCALCITONIN SERPL-MCNC: 0.07 NG/ML
Q-T INTERVAL: 376 MS
QRS DURATION: 90 MS
QTC CALCULATION (BEZET): 467 MS
R AXIS: 30 DEGREES
RHINOVIRUS/ENTERO PCR:: NEGATIVE
RSV RNA SPEC QL NAA+PROBE: NEGATIVE
SED RATE-ML: 56 MM/HR (ref 0–25)
T AXIS: 38 DEGREES
VENTRICULAR RATE: 93 BPM

## 2020-01-10 PROCEDURE — 71275 CT ANGIOGRAPHY CHEST: CPT | Performed by: INTERNAL MEDICINE

## 2020-01-10 PROCEDURE — 94640 AIRWAY INHALATION TREATMENT: CPT

## 2020-01-10 PROCEDURE — 87633 RESP VIRUS 12-25 TARGETS: CPT | Performed by: INTERNAL MEDICINE

## 2020-01-10 PROCEDURE — 36410 VNPNXR 3YR/> PHY/QHP DX/THER: CPT

## 2020-01-10 PROCEDURE — 85652 RBC SED RATE AUTOMATED: CPT | Performed by: INTERNAL MEDICINE

## 2020-01-10 PROCEDURE — 87798 DETECT AGENT NOS DNA AMP: CPT | Performed by: INTERNAL MEDICINE

## 2020-01-10 PROCEDURE — 87999 UNLISTED MICROBIOLOGY PX: CPT

## 2020-01-10 PROCEDURE — 84132 ASSAY OF SERUM POTASSIUM: CPT | Performed by: SPECIALIST

## 2020-01-10 PROCEDURE — 94667 MNPJ CHEST WALL 1ST: CPT

## 2020-01-10 PROCEDURE — 76937 US GUIDE VASCULAR ACCESS: CPT

## 2020-01-10 PROCEDURE — 87581 M.PNEUMON DNA AMP PROBE: CPT | Performed by: INTERNAL MEDICINE

## 2020-01-10 PROCEDURE — 87486 CHLMYD PNEUM DNA AMP PROBE: CPT | Performed by: INTERNAL MEDICINE

## 2020-01-10 RX ORDER — GUAIFENESIN 600 MG
600 TABLET, EXTENDED RELEASE 12 HR ORAL 2 TIMES DAILY
Status: DISCONTINUED | OUTPATIENT
Start: 2020-01-10 | End: 2020-01-12

## 2020-01-10 RX ORDER — CEFDINIR 300 MG/1
300 CAPSULE ORAL 2 TIMES DAILY
Qty: 14 CAPSULE | Refills: 0 | Status: SHIPPED | OUTPATIENT
Start: 2020-01-10 | End: 2020-01-17

## 2020-01-10 RX ORDER — PREDNISONE 10 MG/1
TABLET ORAL
Qty: 60 TABLET | Refills: 0 | Status: ON HOLD | OUTPATIENT
Start: 2020-01-10 | End: 2021-01-09

## 2020-01-10 RX ORDER — IPRATROPIUM BROMIDE AND ALBUTEROL SULFATE 2.5; .5 MG/3ML; MG/3ML
3 SOLUTION RESPIRATORY (INHALATION)
Status: DISCONTINUED | OUTPATIENT
Start: 2020-01-10 | End: 2020-01-12

## 2020-01-10 RX ORDER — SODIUM CHLORIDE 0.9 % (FLUSH) 0.9 %
10 SYRINGE (ML) INJECTION EVERY 12 HOURS
Status: DISCONTINUED | OUTPATIENT
Start: 2020-01-10 | End: 2020-01-12

## 2020-01-10 RX ORDER — HYDROCODONE BITARTRATE AND ACETAMINOPHEN 10; 325 MG/1; MG/1
1 TABLET ORAL EVERY 6 HOURS PRN
Status: DISCONTINUED | OUTPATIENT
Start: 2020-01-10 | End: 2020-01-10

## 2020-01-10 RX ORDER — IPRATROPIUM BROMIDE AND ALBUTEROL SULFATE 2.5; .5 MG/3ML; MG/3ML
3 SOLUTION RESPIRATORY (INHALATION)
Qty: 90 VIAL | Refills: 2 | Status: SHIPPED | OUTPATIENT
Start: 2020-01-10

## 2020-01-10 RX ORDER — METHYLPREDNISOLONE SODIUM SUCCINATE 40 MG/ML
40 INJECTION, POWDER, LYOPHILIZED, FOR SOLUTION INTRAMUSCULAR; INTRAVENOUS EVERY 8 HOURS
Status: COMPLETED | OUTPATIENT
Start: 2020-01-10 | End: 2020-01-11

## 2020-01-10 RX ORDER — ATORVASTATIN CALCIUM 10 MG/1
10 TABLET, FILM COATED ORAL NIGHTLY
Status: DISCONTINUED | OUTPATIENT
Start: 2020-01-10 | End: 2020-01-12

## 2020-01-10 RX ORDER — METOCLOPRAMIDE 10 MG/1
10 TABLET ORAL
Status: DISCONTINUED | OUTPATIENT
Start: 2020-01-10 | End: 2020-01-12

## 2020-01-10 RX ORDER — SODIUM CHLORIDE 30 MG/ML INHALATION SOLUTION 30 MG/ML
3 SOLUTION INHALANT 3 TIMES DAILY
Qty: 90 VIAL | Refills: 2 | Status: ON HOLD | OUTPATIENT
Start: 2020-01-10 | End: 2021-01-09

## 2020-01-10 RX ORDER — POTASSIUM CHLORIDE 20 MEQ/1
40 TABLET, EXTENDED RELEASE ORAL EVERY 4 HOURS
Status: COMPLETED | OUTPATIENT
Start: 2020-01-10 | End: 2020-01-10

## 2020-01-10 RX ORDER — HYDROCODONE BITARTRATE AND ACETAMINOPHEN 10; 325 MG/1; MG/1
1 TABLET ORAL EVERY 4 HOURS PRN
Status: DISCONTINUED | OUTPATIENT
Start: 2020-01-10 | End: 2020-01-12

## 2020-01-10 RX ORDER — BUDESONIDE AND FORMOTEROL FUMARATE DIHYDRATE 160; 4.5 UG/1; UG/1
2 AEROSOL RESPIRATORY (INHALATION) DAILY
COMMUNITY

## 2020-01-10 RX ORDER — ALBUTEROL SULFATE 2.5 MG/3ML
2.5 SOLUTION RESPIRATORY (INHALATION) EVERY 4 HOURS PRN
Status: DISCONTINUED | OUTPATIENT
Start: 2020-01-10 | End: 2020-01-12

## 2020-01-10 RX ORDER — SODIUM CHLORIDE 30 MG/ML INHALATION SOLUTION 30 MG/ML
3 SOLUTION INHALANT
Status: DISCONTINUED | OUTPATIENT
Start: 2020-01-10 | End: 2020-01-12

## 2020-01-10 NOTE — ED PROVIDER NOTES
Patient Seen in: [facility] Emergency Department      History   Patient presents with:  Dyspnea IRIS SOB    Stated Complaint:     HPI    Is a pleasant 44-year-old female with a longstanding history of sarcoid as well as right intrabronchial mass who Never Used    Alcohol use: Never      Frequency: Never    Drug use: Never             Review of Systems    Positive for stated complaint:   Other systems are as noted in HPI. Constitutional and vital signs reviewed.       All other systems reviewed and neg All other components within normal limits   TROPONIN I - Normal   CBC WITH DIFFERENTIAL WITH PLATELET    Narrative: The following orders were created for panel order CBC WITH DIFFERENTIAL WITH PLATELET.   Procedure                               Abnormal medications. I spoke to Dr. Allison Erickson from her pulmonary service. I will also speak to her primary service to arrange for admission. She was admitted in good condition.   A total of 40 minutes of critical care time (exclusive of billable procedures) was ad

## 2020-01-10 NOTE — PAYOR COMM NOTE
--------------  ADMISSION REVIEW     Payor: [place] [employer]  Subscriber #:  BJORN MIRANDA CHATO  Authorization Number: [id number]    Admit date: 1/9/20  Admit time: 2351       Admitting Physician: Giuliano Augustine MD  Attending Physician:   MD HIMANSHU Ricardo heart disease ([facility] [address]    • COPD (chronic obstructive pulmonary disease) (HCC)    • Esophageal reflux    • Essential hypertension    • High blood pressure    • High cholesterol    • Osteoarthritis    • Pneumonia    • Pneumonia due to organism    • Sarcoidosis Neurologically intact.        ED Course     Labs Reviewed   COMP METABOLIC PANEL (14) - Abnormal; Notable for the following components:       Result Value    Glucose 138 (*)     BUN/CREA Ratio 8.5 (*)     A/G Ratio 0.8 (*)     All other components within no (exclusive of billable procedures) was administered to manage the patient's respiratory instability due to her acute bronchospasm requiring hour-long neb treatment.   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

## 2020-01-10 NOTE — ED INITIAL ASSESSMENT (HPI)
Patient states she started to feel SOB and increased WOB around 3:30pm after taking the garbage can out. She had significant SOB- used her inhaler and nebulizer that didn't seem to work. Patient states \"I almost called 911, but I didn't.  I just got for my

## 2020-01-10 NOTE — ED NOTES
Pt c/o feeling hot through her body after antibiotic was started. No hives or rash, no throat swelling, swallowing fine, MD aware. Pt on pulse ox and tele.

## 2020-01-10 NOTE — PLAN OF CARE
Pt. A&O x4. VSS. Pt. C/o SOB with exertion. Pt. O2 sats 94-96% on room air while resting. Pt. Requesting 1-2 L O2 PRN. O2 walk completed. While ambulating pt. O2 sats were 90-96% on room air.  Pt. C/o SOB during walk; O2 sat staying at 92-94% on room air; a retention  Outcome: Progressing

## 2020-01-10 NOTE — CONSULTS
Ron Dove [address] [facility] [address] Note [facility]  Report of Consultation    Andersonaminta Arizmendikenroy Patient Status:  Inpatient    1945 MRN [id number]   [facility] 4NW-A Attending Margery Favre Sarcoidosis    • Shortness of breath      Past Surgical History:   Procedure Laterality Date   • APPENDECTOMY     • HYSTERECTOMY     • MARIA LUZ BIOPSY STEREO NODULE 2 SITE BILAT (CPT=19081/20937)      2015   • TOTAL ABDOM HYSTERECTOMY       Family History   Pro dysuria, hematuria, urinary retention. Behavioral/Psych: Normal affect, mood, speech. No AVH, No SI/HI    All other review of systems are negative.     Vital signs in last 24 hours:  Patient Vitals for the past 24 hrs:   BP Temp Temp src Pulse Resp SpO2 He No rashes, ulcers, nodules    Lab Data Review:  Recent Labs   Lab 01/09/20 2022   *   BUN 8   CREATSERUM 0.94   GFRAA 70   GFRNAA 60   CA 9.1      K 3.6      CO2 29.0     Recent Labs   Lab 01/09/20 2022   RBC 4.07   HGB 11.5*   HCT 36. continues to decline bronchoscopy  · Bronchiectasis in hypoplastic right lung  · Narrowing of right main PA with LAD on imaging  · H/o massive hemoptysis 7/2017 in [place] - refused bronchoscopy then as well    PLAN    · Continue empiric abx regimen for

## 2020-01-10 NOTE — PROGRESS NOTES
NURSING ADMISSION NOTE      Patient admitted via Cart  Oriented to room. Safety precautions initiated. Bed in low position. Call light in reach. Pt arrived to room 429. Alert and oriented x4. VSS, afebrile. C/o chronic right back pain.  MD notifie

## 2020-01-11 ENCOUNTER — APPOINTMENT (OUTPATIENT)
Dept: GENERAL RADIOLOGY | Facility: HOSPITAL | Age: 75
DRG: 190 | End: 2020-01-11
Attending: INTERNAL MEDICINE
Payer: MEDICARE

## 2020-01-11 PROCEDURE — 87070 CULTURE OTHR SPECIMN AEROBIC: CPT | Performed by: INTERNAL MEDICINE

## 2020-01-11 PROCEDURE — 87205 SMEAR GRAM STAIN: CPT | Performed by: INTERNAL MEDICINE

## 2020-01-11 PROCEDURE — 71045 X-RAY EXAM CHEST 1 VIEW: CPT | Performed by: INTERNAL MEDICINE

## 2020-01-11 PROCEDURE — 94640 AIRWAY INHALATION TREATMENT: CPT

## 2020-01-11 PROCEDURE — 94668 MNPJ CHEST WALL SBSQ: CPT

## 2020-01-11 RX ORDER — DIPHENHYDRAMINE HYDROCHLORIDE 50 MG/ML
25 INJECTION INTRAMUSCULAR; INTRAVENOUS EVERY 6 HOURS PRN
Status: DISCONTINUED | OUTPATIENT
Start: 2020-01-11 | End: 2020-01-12

## 2020-01-11 RX ORDER — ACETAMINOPHEN 325 MG/1
650 TABLET ORAL EVERY 6 HOURS PRN
Status: DISCONTINUED | OUTPATIENT
Start: 2020-01-11 | End: 2020-01-12

## 2020-01-11 RX ORDER — ENOXAPARIN SODIUM 100 MG/ML
40 INJECTION SUBCUTANEOUS DAILY
Status: DISCONTINUED | OUTPATIENT
Start: 2020-01-11 | End: 2020-01-12

## 2020-01-11 RX ORDER — HYDRALAZINE HYDROCHLORIDE 20 MG/ML
10 INJECTION INTRAMUSCULAR; INTRAVENOUS EVERY 6 HOURS PRN
Status: DISCONTINUED | OUTPATIENT
Start: 2020-01-11 | End: 2020-01-12

## 2020-01-11 NOTE — PLAN OF CARE
Problem: PAIN - ADULT  Goal: Verbalizes/displays adequate comfort level or patient's stated pain goal  Description  INTERVENTIONS:  - Encourage pt to monitor pain and request assistance  - Assess pain using appropriate pain scale  - Administer analgesics hypotension and signs of decreased cardiac output  - Evaluate effectiveness of vasoactive medications to optimize hemodynamic stability  - Monitor arterial and/or venous puncture sites for bleeding and/or hematoma  - Assess quality of pulses, skin color an

## 2020-01-11 NOTE — PROGRESS NOTES
[facility]  Progress Note    Phan Alfonso Patient Status:  Inpatient    1945 MRN [id number]   [facility] 4NW-A Attending Delmy Castaneda MD   Hosp Day # 2 PCP Genaro Clark MD         SUBJECTIVE:  Subjective:  Tunde Bush LAT CHEST (CPT=71046)  INDICATIONS:  D86.9 Sarcoidosis, unspecified R06.00 Dyspnea, unspecified R91.8 Other nonspecific abnormal finding of lung field J44.9 Chronic obstructive pulmonary disease, *  COMPARISON:  [facility] , XR, XR CHEST AP PORTABLE  (CPT=7104 attenuated. This may be due to mass effect from adjacent lymphadenopathy. This has an  overall stable appearance.   In the right lung evaluation is slightly limited due to the attenuated vessels, however there remains no convincing evidence of pulmonary e TECHNIQUE:  AP chest radiograph was obtained. COMPARISON:  [facility] , XR, XR CHEST AP PORTABLE  (CPT=71045), 1/09/2020, 20:02. INDICATIONS:  hypoxia  PATIENT STATED HISTORY: (As transcribed by Technologist)  Hypoxia.  Patient offered no additional history a daily)   • guaiFENesin ER  600 mg Oral BID   • sodium chloride  3 mL Nebulization QID   • Fluticasone-Umeclidin-Vilant  1 puff Inhalation Daily   • azithromycin  500 mg Intravenous Q24H   • cefepime  1 g Intravenous Q8H   • predniSONE  60 mg Oral Daily   •

## 2020-01-11 NOTE — PROGRESS NOTES
[facility]  Progress Note    Alpa Bernal Patient Status:  Inpatient    1945 MRN [id number]   [facility] 4NW-A Attending Tracy Barrientos MD   Hosp Day # 2 PCP Kayleen Tomas MD     Subjective:  Alpa Bernal is negative  Sputum not produced    Radiology:  Chest CT 1/10- no significant changes. Narrowing of BI/RML with stent, calcified right hilar and mediastinal nodes, narrowed right PA.   Stable appearance of right opacities    Medications Reviewed:  Enoxaparin healthcare associated pneumonia, possible mucus plugging given known bronchial stenosis of ALEXI/BI  · Dyspnea due to above  · H/o sarcoidosis with airway involvement of the ALEXI/BI s/p metallic stent at [facility] and never removed; this has been suspected to

## 2020-01-11 NOTE — CM/SW NOTE
ECIN response that [facility] home health is able to accept pt.      Fortino Curling RN, [address] [facility] [address]

## 2020-01-11 NOTE — PROGRESS NOTES
01/11/20 1646   Clinical Encounter Type   Visited With Patient   Routine Visit Introduction   Continue Visiting No   Voodoo Encounters   Voodoo Needs Prayer   Patient Spiritual Encounters   Spiritual Needs patient presents spiritual strength   Spi

## 2020-01-11 NOTE — H&P
[facility]    PATIENT'S NAME: Mike Collins Che   ATTENDING PHYSICIAN: Thu Lyn M.D.    PATIENT ACCOUNT#:   [de-identified]    LOCATION:  [address] [place] [address]  MEDICAL RECORD #:   [id number]       YOB: 1945  ADMISSION DATE:       01/0 bronchiectasis; hypertensive heart disease; history of congestive heart failure, left ventricle unknown. PLAN:  Empiric antibiotic, steroid, nebs, pulmonary evaluation. Hopefully, she can be discharged tomorrow.     Dictated By Benigno Gold M.D.

## 2020-01-12 VITALS
HEIGHT: 64 IN | WEIGHT: 213.38 LBS | TEMPERATURE: 98 F | DIASTOLIC BLOOD PRESSURE: 74 MMHG | BODY MASS INDEX: 36.43 KG/M2 | OXYGEN SATURATION: 93 % | HEART RATE: 103 BPM | RESPIRATION RATE: 16 BRPM | SYSTOLIC BLOOD PRESSURE: 134 MMHG

## 2020-01-12 LAB
ALBUMIN SERPL-MCNC: 3.3 G/DL (ref 3.4–5)
ALBUMIN/GLOB SERPL: 0.8 {RATIO} (ref 1–2)
ALP LIVER SERPL-CCNC: 87 U/L (ref 55–142)
ALT SERPL-CCNC: 15 U/L (ref 13–56)
ANION GAP SERPL CALC-SCNC: 5 MMOL/L (ref 0–18)
AST SERPL-CCNC: 22 U/L (ref 15–37)
BASOPHILS # BLD AUTO: 0.02 X10(3) UL (ref 0–0.2)
BASOPHILS NFR BLD AUTO: 0.1 %
BILIRUB SERPL-MCNC: 0.5 MG/DL (ref 0.1–2)
BUN BLD-MCNC: 16 MG/DL (ref 7–18)
BUN/CREAT SERPL: 19.3 (ref 10–20)
CALCIUM BLD-MCNC: 9.4 MG/DL (ref 8.5–10.1)
CHLORIDE SERPL-SCNC: 109 MMOL/L (ref 98–112)
CO2 SERPL-SCNC: 28 MMOL/L (ref 21–32)
CREAT BLD-MCNC: 0.83 MG/DL (ref 0.55–1.02)
DEPRECATED RDW RBC AUTO: 42.5 FL (ref 35.1–46.3)
EOSINOPHIL # BLD AUTO: 0 X10(3) UL (ref 0–0.7)
EOSINOPHIL NFR BLD AUTO: 0 %
ERYTHROCYTE [DISTWIDTH] IN BLOOD BY AUTOMATED COUNT: 12.8 % (ref 11–15)
GLOBULIN PLAS-MCNC: 4.3 G/DL (ref 2.8–4.4)
GLUCOSE BLD-MCNC: 96 MG/DL (ref 70–99)
HCT VFR BLD AUTO: 36.3 % (ref 35–48)
HGB BLD-MCNC: 11.1 G/DL (ref 12–16)
IMM GRANULOCYTES # BLD AUTO: 0.05 X10(3) UL (ref 0–1)
IMM GRANULOCYTES NFR BLD: 0.4 %
LYMPHOCYTES # BLD AUTO: 4.11 X10(3) UL (ref 1–4)
LYMPHOCYTES NFR BLD AUTO: 29.8 %
M PROTEIN MFR SERPL ELPH: 7.6 G/DL (ref 6.4–8.2)
MCH RBC QN AUTO: 27.5 PG (ref 26–34)
MCHC RBC AUTO-ENTMCNC: 30.6 G/DL (ref 31–37)
MCV RBC AUTO: 89.9 FL (ref 80–100)
MONOCYTES # BLD AUTO: 1.41 X10(3) UL (ref 0.1–1)
MONOCYTES NFR BLD AUTO: 10.2 %
NEUTROPHILS # BLD AUTO: 8.2 X10 (3) UL (ref 1.5–7.7)
NEUTROPHILS # BLD AUTO: 8.2 X10(3) UL (ref 1.5–7.7)
NEUTROPHILS NFR BLD AUTO: 59.5 %
OSMOLALITY SERPL CALC.SUM OF ELEC: 295 MOSM/KG (ref 275–295)
PLATELET # BLD AUTO: 316 10(3)UL (ref 150–450)
POTASSIUM SERPL-SCNC: 3.7 MMOL/L (ref 3.5–5.1)
RBC # BLD AUTO: 4.04 X10(6)UL (ref 3.8–5.3)
SODIUM SERPL-SCNC: 142 MMOL/L (ref 136–145)
WBC # BLD AUTO: 13.8 X10(3) UL (ref 4–11)

## 2020-01-12 PROCEDURE — 94640 AIRWAY INHALATION TREATMENT: CPT

## 2020-01-12 PROCEDURE — 94668 MNPJ CHEST WALL SBSQ: CPT

## 2020-01-12 PROCEDURE — 80053 COMPREHEN METABOLIC PANEL: CPT | Performed by: INTERNAL MEDICINE

## 2020-01-12 PROCEDURE — 85025 COMPLETE CBC W/AUTO DIFF WBC: CPT | Performed by: INTERNAL MEDICINE

## 2020-01-12 RX ORDER — POTASSIUM CHLORIDE 20 MEQ/1
40 TABLET, EXTENDED RELEASE ORAL ONCE
Status: COMPLETED | OUTPATIENT
Start: 2020-01-12 | End: 2020-01-12

## 2020-01-12 NOTE — PLAN OF CARE
Patient A+Ox4. IV abt per MAR, afebrile. Ambulated halls a few times last night. SOB upon exertion. Remains on room air C/o back pain and PRN norco given x2.      Problem: PAIN - ADULT  Goal: Verbalizes/displays adequate comfort level or patient's stated pa

## 2020-01-12 NOTE — PROGRESS NOTES
[facility]  Progress Note    Conrado Darnell Patient Status:  Inpatient    1945 MRN [id number]   [facility] 4NW-A Attending Juvencio Grant MD   Hosp Day # 3 PCP Shun Diop MD     Subjective:  Conrado Darnell is TROPONINT, CKMBINDEX    Cultures:  RVP negative  Sputum not produced    Radiology:  Chest CT 1/10- no significant changes. Narrowing of BI/RML with stent, calcified right hilar and mediastinal nodes, narrowed right PA.   Stable appearance of right opacitie opacities on RLL - possible pneumonia/ healthcare associated pneumonia, possible mucus plugging given known bronchial stenosis of ALEXI/BI  · Dyspnea due to above  · H/o sarcoidosis with airway involvement of the ALEXI/BI s/p metallic stent at [facility] and ne

## 2020-01-12 NOTE — PHYSICAL THERAPY NOTE
Order received for PT Evaluation. RN reports to PT that pt is up and walking in hallway with RW multiple times per day, steady gait, baseline level.  PT spoke to pt and pt's daughter who agree that pt is at baseline level, has a RW at home, agree to defer

## 2020-01-12 NOTE — DISCHARGE SUMMARY
[facility]  Discharge Summary    Georgina Keto Patient Status:  Inpatient    1945 MRN [id number]   [facility] 4NW-A Attending Jj Quinones MD   Hosp Day # 3 PCP Thu Lyn MD     Date of Admission: 2020    Rosalio abnormalities at the lung bases, particularly the right lung base are stable.     Dictated by: Shanda Waters MD on 12/27/2019 at 11:04     Approved by: Shanda Waters MD on 12/27/2019 at 11:06          Ct Angiography, Chest (cpt=71275)    Result Da hilar lymph nodes are present. BELLE:  As above. CARDIAC:  No enlargement, pericardial thickening, or significant calcification. PLEURA:  No mass or effusion. CHEST WALL:  No mass or axillary adenopathy.   LIMITED ABDOMEN:  Limited images of the upper abdom 1/9/2020  PROCEDURE:  XR CHEST AP PORTABLE  (CPT=71045)  TECHNIQUE:  AP chest radiograph was obtained. COMPARISON:  [facility] , XR, XR CHEST PA + LAT CHEST (WSI=85662), 12/27/2019, 8:03. [facility] , XR, XR CHEST AP PORTABLE  (CPT=71045), 9/23/2019, 6:08.   YOANA Appearance:    Alert, cooperative, no distress, appears stated age   Head:    Normocephalic,  atraumatic   Neck:   Supple, symmetrical, trachea midline,        thyroid:      no JVD   Lungs:     Wheezes bilaterally, respirations unlabored         Heart: abnormalities at the lung bases are stable. CARDIAC:  Mildly prominent cardiac silhouette is noted. MEDIASTINUM:  Normal. PLEURA:  Normal.  No pleural effusions. BONES:  Normal for age.       CONCLUSION:  Volume loss in the right lung is stable.   Interstit There is a stable appearance of ground-glass opacity noted throughout the right lung. This has a somewhat chronic appearance. Fibrotic changes are favored given their stability. No new focal consolidation is seen.  MEDIASTINUM:  Calcified mediastinal and    CONCLUSION:  Allowing for slight rotational differences, no significant interval change.     Dictated by: Guanaco Sanchez MD on 1/11/2020 at 7:36     Approved by: Guanaco Sanchez MD on 1/11/2020 at 7:37           Xr Chest Ap Portable  (cpt=71045)     Result Da acetaminophen, albuterol sulfate, HYDROcodone-acetaminophen, ondansetron HCl         Assessment/Plan:   Patient Active Problem List:     Community acquired pneumonia of right upper lobe of lung     Sarcoid     Diastolic CHF ([facility] [address]     Benign hypertension Cap  Take 1 capsule (300 mg total) by mouth 2 (two) times daily for 7 days.   Qty: 14 capsule Refills: 0      CONTINUE these medications which have CHANGED    predniSONE 10 MG Oral Tab  Take take 4 tabs for 5 days, then 3 tabs for 5 days, then 2 tabs for 5

## 2020-01-12 NOTE — PROGRESS NOTES
Patient discharged to home with her daughter explained all medications and follow up appts. Also explained to have her scripts filled. all belonging taken with patient.

## 2020-01-12 NOTE — PROGRESS NOTES
[facility]  Progress Note    Geo Daughters Patient Status:  Inpatient    1945 MRN [id number]   [facility] 4NW-A Attending Grey Gordon MD   Hosp Day # 3 PCP Ranulfo Benavides MD         SUBJECTIVE:  Subjective:  Desirae Legacy 01/12/20  0543   ALT 19 15   AST 21 22   ALB 3.5 3.3*       No results for input(s): PGLU in the last 168 hours. No results for input(s): URINE, CULTI, BLDSMR in the last 168 hours. No results found for this visit on 01/09/20.     Xr Chest Pa + Lat Ch Index Registry. PATIENT STATED HISTORY:(As transcribed by Technologist)  Patient having dyspnea and hypoxia.    CONTRAST USED:  74cc of Omnipaque 350  FINDINGS:  VASCULATURE:  There is no pulmonary embolism to the first subsegmental arterial level on the l noted within the right lung. Given the overall appearance and stability, a chronic fibrotic process is favored.    Dictated by: Won Santoro MD on 1/10/2020 at 13:05     Approved by: Won Santoro MD on 1/10/2020 at 13:10          Xr Chest Ap Portable  (cpt=710 lobe.  Stable blunting right CP angle. CONCLUSION:  Slight worsening in the diffuse right lung infiltrate. Details above.     Dictated by: Nico Phillips MD on 1/09/2020 at 20:15     Approved by: Nico Phillips MD on 1/09/2020 at 20:17 bronchiectasis  4.; hypertensive heart disease;  5. history of congestive heart failure,  Oxygenation–steroids, supportive care, she is feeling much better, discharge plan    See tests ordered,  Available and radiology reviewed  All consultant notes review

## 2020-01-17 NOTE — PAYOR COMM NOTE
--------------  DISCHARGE REVIEW    Payor: Rosa Lindsey  Subscriber #:  BJORN MIRANDA CHATO  Authorization Number: [id number]    Admit date: 1/9/20  Admit time:  2351  Discharge Date: 1/12/2020  3:29 PM     Admitting Physician:  Renea Kirk MD  Attending Chronic obstructive pulmonary disease, *  COMPARISON:  [facility] , XR, XR CHEST AP PORTABLE  (CPT=71045), 9/23/2019, 6:08.  TECHNIQUE:  PA and lateral chest radiographs were obtained.   PATIENT STATED HISTORY: (As transcribed by Technologist)  She has a histor slightly limited due to the attenuated vessels, however there remains no convincing evidence of pulmonary embolism. THORACIC AORTA:  No aneurysm or visible dissection on this nongated PE protocol exam.  LUNGS:  Central airways appear patent.   There is a st PATIENT STATED HISTORY: (As transcribed by Technologist)  Hypoxia. Patient offered no additional history at this time. FINDINGS:  There is slight rotation to the right. Allowing for this, no change in the right mid to lower lung infiltrate.   The left alert , cooperative  No nausea , no vomiting, no abdominal pain  No chest pain, + shortness of breath,  + cough, congestion,   No fever  Sitting up, feeling much better, shortness of breath, cough and congestion has improved significantly, she is requestin Chest Pa + Lat Chest (esx=15689)     Result Date: 12/27/2019  PROCEDURE:  XR CHEST PA + LAT CHEST (CPT=71046)  INDICATIONS:  D86.9 Sarcoidosis, unspecified R06.00 Dyspnea, unspecified R91.8 Other nonspecific abnormal finding of lung field J44.9 Chronic obs arterial level on the left. Stable appearance of the right main pulmonary artery which appears attenuated. This may be due to mass effect from adjacent lymphadenopathy. This has an  overall stable appearance.   In the right lung evaluation is slightly li Ap Portable  (cpt=71045)     Result Date: 1/11/2020  PROCEDURE:  XR CHEST AP PORTABLE  (CPT=71045)  TECHNIQUE:  AP chest radiograph was obtained. COMPARISON:  JOSE EDUARDO , XR, XR CHEST AP PORTABLE  (CPT=71045), 1/09/2020, 20:02.   INDICATIONS:  hypoxia  PATIEN 1/09/2020 at 20:17                 Meds:      • enoxaparin  40 mg Subcutaneous Daily   • ipratropium-albuterol  3 mL Nebulization Q4H WA (5 times daily)   • guaiFENesin ER  600 mg Oral BID   • sodium chloride  3 mL Nebulization QID   • Fluticasone-Umeclidi reviewed  All consultant notes reviewed  Discussed with nursing on floor  dvt prophylaxis reviewed  PT and/or OT      Consultations:   please refer to chart for details  Procedures:  Please refer to chart for details    Disposition: [address] MCG/DOSE Inhalation Aerosol Powder, Breath Activated          Follow up Visits: Follow-up with all MD's as per their recommendation/ refer to chart for details. 3 days    Vanesa Mirza  1/12/2020  12:42 PM    Electronically signed by Hasmukh Santacruz MD on 1

## 2020-02-10 ENCOUNTER — HOSPITAL ENCOUNTER (OUTPATIENT)
Dept: MAMMOGRAPHY | Age: 75
Discharge: HOME | End: 2020-02-10
Attending: SPECIALIST
Payer: MEDICARE

## 2020-02-10 DIAGNOSIS — Z12.31 ENCOUNTER FOR SCREENING MAMMOGRAM FOR MALIGNANT NEOPLASM OF BREAST: ICD-10-CM

## 2020-02-10 PROCEDURE — 77067 SCR MAMMO BI INCL CAD: CPT | Performed by: SPECIALIST

## 2020-02-10 PROCEDURE — 77063 BREAST TOMOSYNTHESIS BI: CPT | Performed by: SPECIALIST

## 2020-09-22 NOTE — PROGRESS NOTES
[facility]  Progress Note    Jennifer Gaspar Patient Status:  Inpatient    1945 MRN [id number]   [facility] 5NW-A Attending Vitaliy Madison MD   Hosp Day # 4 PCP Karina Orellana MD         SUBJECTIVE:  Subjective:  Laura Powers results for input(s): PGLU in the last 168 hours. No results for input(s): URINE, CULTI, BLDSMR in the last 168 hours.             Meds:     • ipratropium-albuterol  3 mL Nebulization 6 times per day   • predniSONE  40 mg Oral Daily with breakfast   • ce Topical Clindamycin Pregnancy And Lactation Text: This medication is Pregnancy Category B and is considered safe during pregnancy. It is unknown if it is excreted in breast milk.

## 2020-09-26 ENCOUNTER — APPOINTMENT (OUTPATIENT)
Dept: ULTRASOUND IMAGING | Facility: HOSPITAL | Age: 75
End: 2020-09-26
Attending: EMERGENCY MEDICINE
Payer: MEDICARE

## 2020-09-26 ENCOUNTER — HOSPITAL ENCOUNTER (EMERGENCY)
Facility: HOSPITAL | Age: 75
Discharge: HOME | End: 2020-09-26
Attending: EMERGENCY MEDICINE
Payer: MEDICARE

## 2020-09-26 ENCOUNTER — HOSPITAL ENCOUNTER (OUTPATIENT)
Dept: GENERAL RADIOLOGY | Facility: HOSPITAL | Age: 75
Discharge: HOME | End: 2020-09-26
Attending: SPECIALIST
Payer: MEDICARE

## 2020-09-26 ENCOUNTER — APPOINTMENT (OUTPATIENT)
Dept: CT IMAGING | Facility: HOSPITAL | Age: 75
End: 2020-09-26
Attending: EMERGENCY MEDICINE
Payer: MEDICARE

## 2020-09-26 VITALS
OXYGEN SATURATION: 95 % | RESPIRATION RATE: 16 BRPM | DIASTOLIC BLOOD PRESSURE: 62 MMHG | WEIGHT: 218 LBS | TEMPERATURE: 99 F | HEIGHT: 64 IN | BODY MASS INDEX: 37.22 KG/M2 | SYSTOLIC BLOOD PRESSURE: 128 MMHG | HEART RATE: 108 BPM

## 2020-09-26 DIAGNOSIS — R05.9 COUGH: ICD-10-CM

## 2020-09-26 DIAGNOSIS — M54.15 RADICULOPATHY OF THORACOLUMBAR REGION: ICD-10-CM

## 2020-09-26 DIAGNOSIS — R10.9 ABDOMINAL PAIN, ACUTE: Primary | ICD-10-CM

## 2020-09-26 DIAGNOSIS — R11.2 NAUSEA AND VOMITING, INTRACTABILITY OF VOMITING NOT SPECIFIED, UNSPECIFIED VOMITING TYPE: ICD-10-CM

## 2020-09-26 DIAGNOSIS — I25.10 ATHEROSCLEROSIS OF NATIVE CORONARY ARTERY OF NATIVE HEART WITHOUT ANGINA PECTORIS: ICD-10-CM

## 2020-09-26 DIAGNOSIS — R18.8 ASCITES: ICD-10-CM

## 2020-09-26 LAB
ALBUMIN SERPL-MCNC: 3.1 G/DL (ref 3.4–5)
ALBUMIN/GLOB SERPL: 0.7 {RATIO} (ref 1–2)
ALP LIVER SERPL-CCNC: 79 U/L
ALT SERPL-CCNC: 32 U/L
ANION GAP SERPL CALC-SCNC: 2 MMOL/L (ref 0–18)
APTT PPP: 32.3 SECONDS (ref 25.4–36.1)
AST SERPL-CCNC: 37 U/L (ref 15–37)
BASOPHILS # BLD AUTO: 0.03 X10(3) UL (ref 0–0.2)
BASOPHILS NFR BLD AUTO: 0.3 %
BILIRUB SERPL-MCNC: 0.8 MG/DL (ref 0.1–2)
BILIRUB UR QL STRIP.AUTO: NEGATIVE
BUN BLD-MCNC: 14 MG/DL (ref 7–18)
BUN/CREAT SERPL: 11.2 (ref 10–20)
CALCIUM BLD-MCNC: 9.3 MG/DL (ref 8.5–10.1)
CHLORIDE SERPL-SCNC: 101 MMOL/L (ref 98–112)
CLARITY UR REFRACT.AUTO: CLEAR
CO2 SERPL-SCNC: 37 MMOL/L (ref 21–32)
COLOR UR AUTO: YELLOW
CREAT BLD-MCNC: 1.25 MG/DL
DEPRECATED RDW RBC AUTO: 42.5 FL (ref 35.1–46.3)
EOSINOPHIL # BLD AUTO: 0.07 X10(3) UL (ref 0–0.7)
EOSINOPHIL NFR BLD AUTO: 0.6 %
ERYTHROCYTE [DISTWIDTH] IN BLOOD BY AUTOMATED COUNT: 12.5 % (ref 11–15)
GLOBULIN PLAS-MCNC: 4.5 G/DL (ref 2.8–4.4)
GLUCOSE BLD-MCNC: 123 MG/DL (ref 70–99)
GLUCOSE UR STRIP.AUTO-MCNC: NEGATIVE MG/DL
HCT VFR BLD AUTO: 39.2 %
HGB BLD-MCNC: 12.2 G/DL
IMM GRANULOCYTES # BLD AUTO: 0.02 X10(3) UL (ref 0–1)
IMM GRANULOCYTES NFR BLD: 0.2 %
INR BLD: 0.96 (ref 0.89–1.11)
LEUKOCYTE ESTERASE UR QL STRIP.AUTO: NEGATIVE
LIPASE SERPL-CCNC: 47 U/L (ref 73–393)
LYMPHOCYTES # BLD AUTO: 6.45 X10(3) UL (ref 1–4)
LYMPHOCYTES NFR BLD AUTO: 54.8 %
M PROTEIN MFR SERPL ELPH: 7.6 G/DL (ref 6.4–8.2)
MCH RBC QN AUTO: 29 PG (ref 26–34)
MCHC RBC AUTO-ENTMCNC: 31.1 G/DL (ref 31–37)
MCV RBC AUTO: 93.3 FL
MONOCYTES # BLD AUTO: 1.17 X10(3) UL (ref 0.1–1)
MONOCYTES NFR BLD AUTO: 9.9 %
NEUTROPHILS # BLD AUTO: 4.03 X10 (3) UL (ref 1.5–7.7)
NEUTROPHILS # BLD AUTO: 4.03 X10(3) UL (ref 1.5–7.7)
NEUTROPHILS NFR BLD AUTO: 34.2 %
NITRITE UR QL STRIP.AUTO: NEGATIVE
OSMOLALITY SERPL CALC.SUM OF ELEC: 292 MOSM/KG (ref 275–295)
PH UR STRIP.AUTO: 6 [PH] (ref 4.5–8)
PLATELET # BLD AUTO: 266 10(3)UL (ref 150–450)
POTASSIUM SERPL-SCNC: 3.8 MMOL/L (ref 3.5–5.1)
PROT UR STRIP.AUTO-MCNC: NEGATIVE MG/DL
PSA SERPL DL<=0.01 NG/ML-MCNC: 13.1 SECONDS (ref 12.4–14.6)
RBC # BLD AUTO: 4.2 X10(6)UL
RBC UR QL AUTO: NEGATIVE
SODIUM SERPL-SCNC: 140 MMOL/L (ref 136–145)
SP GR UR STRIP.AUTO: 1.01 (ref 1–1.03)
UROBILINOGEN UR STRIP.AUTO-MCNC: 2 MG/DL
WBC # BLD AUTO: 11.8 X10(3) UL (ref 4–11)

## 2020-09-26 PROCEDURE — 99284 EMERGENCY DEPT VISIT MOD MDM: CPT

## 2020-09-26 PROCEDURE — 85610 PROTHROMBIN TIME: CPT | Performed by: EMERGENCY MEDICINE

## 2020-09-26 PROCEDURE — 85025 COMPLETE CBC W/AUTO DIFF WBC: CPT | Performed by: EMERGENCY MEDICINE

## 2020-09-26 PROCEDURE — 76700 US EXAM ABDOM COMPLETE: CPT | Performed by: EMERGENCY MEDICINE

## 2020-09-26 PROCEDURE — 96374 THER/PROPH/DIAG INJ IV PUSH: CPT

## 2020-09-26 PROCEDURE — 72110 X-RAY EXAM L-2 SPINE 4/>VWS: CPT | Performed by: SPECIALIST

## 2020-09-26 PROCEDURE — 83690 ASSAY OF LIPASE: CPT | Performed by: EMERGENCY MEDICINE

## 2020-09-26 PROCEDURE — 85730 THROMBOPLASTIN TIME PARTIAL: CPT | Performed by: EMERGENCY MEDICINE

## 2020-09-26 PROCEDURE — 80053 COMPREHEN METABOLIC PANEL: CPT | Performed by: EMERGENCY MEDICINE

## 2020-09-26 PROCEDURE — 81003 URINALYSIS AUTO W/O SCOPE: CPT | Performed by: EMERGENCY MEDICINE

## 2020-09-26 PROCEDURE — 74176 CT ABD & PELVIS W/O CONTRAST: CPT | Performed by: EMERGENCY MEDICINE

## 2020-09-26 PROCEDURE — 96361 HYDRATE IV INFUSION ADD-ON: CPT

## 2020-09-26 PROCEDURE — 71046 X-RAY EXAM CHEST 2 VIEWS: CPT | Performed by: SPECIALIST

## 2020-09-26 RX ORDER — SODIUM CHLORIDE 9 MG/ML
1000 INJECTION, SOLUTION INTRAVENOUS ONCE
Status: COMPLETED | OUTPATIENT
Start: 2020-09-26 | End: 2020-09-26

## 2020-09-26 RX ORDER — ONDANSETRON 4 MG/1
4 TABLET, ORALLY DISINTEGRATING ORAL EVERY 4 HOURS PRN
Qty: 10 TABLET | Refills: 0 | Status: SHIPPED | OUTPATIENT
Start: 2020-09-26 | End: 2020-10-03

## 2020-09-26 RX ORDER — ONDANSETRON 2 MG/ML
4 INJECTION INTRAMUSCULAR; INTRAVENOUS ONCE
Status: COMPLETED | OUTPATIENT
Start: 2020-09-26 | End: 2020-09-26

## 2020-09-26 RX ORDER — PANTOPRAZOLE SODIUM 40 MG/1
40 TABLET, DELAYED RELEASE ORAL DAILY
Qty: 21 TABLET | Refills: 0 | Status: SHIPPED | OUTPATIENT
Start: 2020-09-26

## 2020-09-26 RX ORDER — ACETAMINOPHEN 500 MG
1000 TABLET ORAL ONCE
Status: COMPLETED | OUTPATIENT
Start: 2020-09-26 | End: 2020-09-26

## 2020-09-26 NOTE — ED PROVIDER NOTES
Patient Seen in: [facility] Emergency Department      History   Patient presents with:  Abdomen/Flank Pain    Stated Complaint:     HPI    Patient is a 80-year-old female presents emergency room with a history of some mid abdominal pain and pain to negative except as noted above.     Physical Exam     ED Triage Vitals [09/26/20 1522]   /76   Pulse 104   Resp 20   Temp 98.5 °F (36.9 °C)   Temp src Temporal   SpO2 96 %   O2 Device None (Room air)       Current:/62   Pulse 108   Temp 98.5 °F (*)     Globulin  4.5 (*)     A/G Ratio 0.7 (*)     All other components within normal limits   LIPASE - Abnormal; Notable for the following components:    Lipase 47 (*)     All other components within normal limits   URINALYSIS WITH CULTURE REFLEX - Abnor denies history of any other complaints of discomfort and is declining admission to the hospital and wants to go home at this time. Patient again denies history of any chest pain or shortness of breath.   Patient states she has had some vomiting after eatin this time she is declining admission and wants to go home. Patient instructed to follow-up with primary care return to ER if any problems arise. Patient discharged home with improvement in symptoms and no further complaints.         Disposition and Plan

## 2020-09-26 NOTE — ED INITIAL ASSESSMENT (HPI)
Pt to ED with c/o back pain that radiates to abd pain x1 month. +Vomiting if she eats too much. Denies diarrhea. Afebrile.

## 2020-09-30 ENCOUNTER — HOSPITAL ENCOUNTER (OUTPATIENT)
Dept: CV DIAGNOSTICS | Facility: HOSPITAL | Age: 75
Discharge: HOME | End: 2020-09-30
Attending: SPECIALIST
Payer: MEDICARE

## 2020-09-30 DIAGNOSIS — R18.8 ASCITES: ICD-10-CM

## 2020-09-30 DIAGNOSIS — M54.15 RADICULOPATHY OF THORACOLUMBAR REGION: ICD-10-CM

## 2020-09-30 DIAGNOSIS — R05.9 COUGH: ICD-10-CM

## 2020-09-30 DIAGNOSIS — I25.10 ATHEROSCLEROSIS OF NATIVE CORONARY ARTERY OF NATIVE HEART WITHOUT ANGINA PECTORIS: ICD-10-CM

## 2020-09-30 PROCEDURE — 93306 TTE W/DOPPLER COMPLETE: CPT | Performed by: SPECIALIST

## 2020-12-17 ENCOUNTER — HOSPITAL ENCOUNTER (EMERGENCY)
Facility: HOSPITAL | Age: 75
Discharge: HOME | End: 2020-12-17
Attending: EMERGENCY MEDICINE
Payer: MEDICARE

## 2020-12-17 ENCOUNTER — APPOINTMENT (OUTPATIENT)
Dept: CT IMAGING | Facility: HOSPITAL | Age: 75
End: 2020-12-17
Attending: EMERGENCY MEDICINE
Payer: MEDICARE

## 2020-12-17 VITALS
SYSTOLIC BLOOD PRESSURE: 136 MMHG | BODY MASS INDEX: 34.4 KG/M2 | DIASTOLIC BLOOD PRESSURE: 62 MMHG | RESPIRATION RATE: 17 BRPM | HEIGHT: 64.5 IN | HEART RATE: 110 BPM | OXYGEN SATURATION: 100 % | WEIGHT: 204 LBS | TEMPERATURE: 99 F

## 2020-12-17 DIAGNOSIS — R10.13 EPIGASTRIC PAIN: Primary | ICD-10-CM

## 2020-12-17 PROCEDURE — 96361 HYDRATE IV INFUSION ADD-ON: CPT

## 2020-12-17 PROCEDURE — 93005 ELECTROCARDIOGRAM TRACING: CPT

## 2020-12-17 PROCEDURE — 99285 EMERGENCY DEPT VISIT HI MDM: CPT

## 2020-12-17 PROCEDURE — 81003 URINALYSIS AUTO W/O SCOPE: CPT | Performed by: EMERGENCY MEDICINE

## 2020-12-17 PROCEDURE — 80053 COMPREHEN METABOLIC PANEL: CPT | Performed by: EMERGENCY MEDICINE

## 2020-12-17 PROCEDURE — 84484 ASSAY OF TROPONIN QUANT: CPT | Performed by: EMERGENCY MEDICINE

## 2020-12-17 PROCEDURE — 96374 THER/PROPH/DIAG INJ IV PUSH: CPT

## 2020-12-17 PROCEDURE — 74177 CT ABD & PELVIS W/CONTRAST: CPT | Performed by: EMERGENCY MEDICINE

## 2020-12-17 PROCEDURE — 93010 ELECTROCARDIOGRAM REPORT: CPT

## 2020-12-17 PROCEDURE — 85025 COMPLETE CBC W/AUTO DIFF WBC: CPT | Performed by: EMERGENCY MEDICINE

## 2020-12-17 PROCEDURE — 99284 EMERGENCY DEPT VISIT MOD MDM: CPT

## 2020-12-17 PROCEDURE — 83690 ASSAY OF LIPASE: CPT | Performed by: EMERGENCY MEDICINE

## 2020-12-17 RX ORDER — MORPHINE SULFATE 4 MG/ML
4 INJECTION, SOLUTION INTRAMUSCULAR; INTRAVENOUS EVERY 30 MIN PRN
Status: DISCONTINUED | OUTPATIENT
Start: 2020-12-17 | End: 2020-12-17

## 2020-12-17 RX ORDER — PANTOPRAZOLE SODIUM 20 MG/1
20 TABLET, DELAYED RELEASE ORAL DAILY
Qty: 30 TABLET | Refills: 0 | Status: ON HOLD | OUTPATIENT
Start: 2020-12-17 | End: 2021-01-12

## 2020-12-17 RX ORDER — ONDANSETRON 2 MG/ML
4 INJECTION INTRAMUSCULAR; INTRAVENOUS ONCE
Status: DISCONTINUED | OUTPATIENT
Start: 2020-12-17 | End: 2020-12-17

## 2020-12-17 RX ORDER — ONDANSETRON 2 MG/ML
4 INJECTION INTRAMUSCULAR; INTRAVENOUS ONCE
Status: COMPLETED | OUTPATIENT
Start: 2020-12-17 | End: 2020-12-17

## 2020-12-17 RX ORDER — METOCLOPRAMIDE 10 MG/1
10 TABLET ORAL 3 TIMES DAILY PRN
Qty: 20 TABLET | Refills: 0 | Status: ON HOLD | OUTPATIENT
Start: 2020-12-17 | End: 2021-01-12

## 2020-12-17 NOTE — ED PROVIDER NOTES
Patient Seen in: [facility] Emergency Department      History   Patient presents with:  Nausea/Vomiting/Diarrhea    Stated Complaint: nvd    HPI    Patient is a 71-year-old woman here with epigastric pain nausea vomiting about 3 weeks.   Patient stat Physical Exam  Vitals signs and nursing note reviewed. Constitutional:       General: She is not in acute distress. Appearance: She is well-developed. She is not toxic-appearing. HENT:      Head: Normocephalic and atraumatic.    Eyes:      G PLATELET.   Procedure                               Abnormality         Status                     ---------                               -----------         ------                     CBC W/ DIFFERENTIAL[[id number]]          Abnormal            Final resul cm.  There is a 4 mm nonobstructing calculus upper pole left     kidney. No hydronephrosis. ADRENALS:  No mass or enlargement. AORTA/VASCULAR:  No aneurysm or dissection. RETROPERITONEUM:  No mass or adenopathy.       BOWEL/MESENTERY:  No free underwent CT scanning of the abdomen pelvis. No acute findings see detailed report above. Reexamination findings discussed with patient. She is tolerating p.o. here. Abdomen is soft no peritoneal signs.   We will try her on PPI as she reports she is n

## 2020-12-17 NOTE — ED INITIAL ASSESSMENT (HPI)
Pt to ED with c/o lower abd pain and vomiting x 3 weeks. Pt presents to ED with sputum production. Denies fevers at home.

## 2020-12-28 PROBLEM — R59.0 HILAR LYMPHADENOPATHY: Status: ACTIVE | Noted: 2018-01-08

## 2020-12-30 ENCOUNTER — LAB ENCOUNTER (OUTPATIENT)
Dept: LAB | Facility: HOSPITAL | Age: 75
End: 2020-12-30
Attending: INTERNAL MEDICINE
Payer: MEDICARE

## 2020-12-30 DIAGNOSIS — R11.12 PROJECTILE VOMITING WITH NAUSEA: ICD-10-CM

## 2020-12-31 LAB — SARS-COV-2 RNA RESP QL NAA+PROBE: NOT DETECTED

## 2021-01-02 ENCOUNTER — HOSPITAL ENCOUNTER (OUTPATIENT)
Dept: GENERAL RADIOLOGY | Facility: HOSPITAL | Age: 76
Discharge: HOME | End: 2021-01-02
Attending: INTERNAL MEDICINE
Payer: MEDICARE

## 2021-01-02 DIAGNOSIS — R11.12 PROJECTILE VOMITING WITH NAUSEA: ICD-10-CM

## 2021-01-02 PROCEDURE — 74240 X-RAY XM UPR GI TRC 1CNTRST: CPT | Performed by: INTERNAL MEDICINE

## 2021-01-09 ENCOUNTER — APPOINTMENT (OUTPATIENT)
Dept: CT IMAGING | Facility: HOSPITAL | Age: 76
DRG: 074 | End: 2021-01-09
Attending: EMERGENCY MEDICINE
Payer: MEDICARE

## 2021-01-09 ENCOUNTER — HOSPITAL ENCOUNTER (INPATIENT)
Facility: HOSPITAL | Age: 76
LOS: 1 days | Discharge: HOME HEALTH | DRG: 074 | End: 2021-01-12
Attending: EMERGENCY MEDICINE | Admitting: STUDENT IN AN ORGANIZED HEALTH CARE EDUCATION/TRAINING PROGRAM
Payer: MEDICARE

## 2021-01-09 DIAGNOSIS — R11.2 NAUSEA AND VOMITING, INTRACTABILITY OF VOMITING NOT SPECIFIED, UNSPECIFIED VOMITING TYPE: ICD-10-CM

## 2021-01-09 DIAGNOSIS — R10.9 ABDOMINAL PAIN OF UNKNOWN ETIOLOGY: Primary | ICD-10-CM

## 2021-01-09 LAB
ALBUMIN SERPL-MCNC: 2.9 G/DL (ref 3.4–5)
ALBUMIN/GLOB SERPL: 0.7 {RATIO} (ref 1–2)
ALP LIVER SERPL-CCNC: 92 U/L
ALT SERPL-CCNC: 13 U/L
ANION GAP SERPL CALC-SCNC: 5 MMOL/L (ref 0–18)
AST SERPL-CCNC: 30 U/L (ref 15–37)
ATRIAL RATE: 103 BPM
BASOPHILS # BLD AUTO: 0.05 X10(3) UL (ref 0–0.2)
BASOPHILS NFR BLD AUTO: 0.4 %
BILIRUB SERPL-MCNC: 0.8 MG/DL (ref 0.1–2)
BILIRUB UR QL STRIP.AUTO: NEGATIVE
BUN BLD-MCNC: 6 MG/DL (ref 7–18)
BUN/CREAT SERPL: 6.5 (ref 10–20)
CALCIUM BLD-MCNC: 8.9 MG/DL (ref 8.5–10.1)
CHLORIDE SERPL-SCNC: 109 MMOL/L (ref 98–112)
CLARITY UR REFRACT.AUTO: CLEAR
CO2 SERPL-SCNC: 28 MMOL/L (ref 21–32)
COLOR UR AUTO: COLORLESS
CREAT BLD-MCNC: 0.92 MG/DL
DEPRECATED RDW RBC AUTO: 43.8 FL (ref 35.1–46.3)
EOSINOPHIL # BLD AUTO: 0.25 X10(3) UL (ref 0–0.7)
EOSINOPHIL NFR BLD AUTO: 1.9 %
ERYTHROCYTE [DISTWIDTH] IN BLOOD BY AUTOMATED COUNT: 13.6 % (ref 11–15)
GLOBULIN PLAS-MCNC: 4.3 G/DL (ref 2.8–4.4)
GLUCOSE BLD-MCNC: 101 MG/DL (ref 70–99)
GLUCOSE BLD-MCNC: 102 MG/DL (ref 70–99)
GLUCOSE BLD-MCNC: 124 MG/DL (ref 70–99)
GLUCOSE BLD-MCNC: 86 MG/DL (ref 70–99)
GLUCOSE BLD-MCNC: 89 MG/DL (ref 70–99)
GLUCOSE BLD-MCNC: 93 MG/DL (ref 70–99)
GLUCOSE UR STRIP.AUTO-MCNC: NEGATIVE MG/DL
HCT VFR BLD AUTO: 34.5 %
HGB BLD-MCNC: 10.7 G/DL
IMM GRANULOCYTES # BLD AUTO: 0.05 X10(3) UL (ref 0–1)
IMM GRANULOCYTES NFR BLD: 0.4 %
KETONES UR STRIP.AUTO-MCNC: NEGATIVE MG/DL
LEUKOCYTE ESTERASE UR QL STRIP.AUTO: NEGATIVE
LIPASE SERPL-CCNC: 49 U/L (ref 73–393)
LYMPHOCYTES # BLD AUTO: 4.87 X10(3) UL (ref 1–4)
LYMPHOCYTES NFR BLD AUTO: 37 %
M PROTEIN MFR SERPL ELPH: 7.2 G/DL (ref 6.4–8.2)
MCH RBC QN AUTO: 27.4 PG (ref 26–34)
MCHC RBC AUTO-ENTMCNC: 31 G/DL (ref 31–37)
MCV RBC AUTO: 88.2 FL
MONOCYTES # BLD AUTO: 1.26 X10(3) UL (ref 0.1–1)
MONOCYTES NFR BLD AUTO: 9.6 %
NEUTROPHILS # BLD AUTO: 6.69 X10 (3) UL (ref 1.5–7.7)
NEUTROPHILS # BLD AUTO: 6.69 X10(3) UL (ref 1.5–7.7)
NEUTROPHILS NFR BLD AUTO: 50.7 %
NITRITE UR QL STRIP.AUTO: NEGATIVE
OSMOLALITY SERPL CALC.SUM OF ELEC: 292 MOSM/KG (ref 275–295)
P AXIS: 56 DEGREES
P-R INTERVAL: 172 MS
PH UR STRIP.AUTO: 7 [PH] (ref 4.5–8)
PLATELET # BLD AUTO: 349 10(3)UL (ref 150–450)
POTASSIUM SERPL-SCNC: 3.4 MMOL/L (ref 3.5–5.1)
PROT UR STRIP.AUTO-MCNC: NEGATIVE MG/DL
Q-T INTERVAL: 366 MS
QRS DURATION: 90 MS
QTC CALCULATION (BEZET): 479 MS
R AXIS: 28 DEGREES
RBC # BLD AUTO: 3.91 X10(6)UL
RBC UR QL AUTO: NEGATIVE
SARS-COV-2 RNA RESP QL NAA+PROBE: NOT DETECTED
SODIUM SERPL-SCNC: 142 MMOL/L (ref 136–145)
SP GR UR STRIP.AUTO: 1.02 (ref 1–1.03)
T AXIS: 50 DEGREES
UROBILINOGEN UR STRIP.AUTO-MCNC: <2 MG/DL
VENTRICULAR RATE: 103 BPM
WBC # BLD AUTO: 13.2 X10(3) UL (ref 4–11)

## 2021-01-09 PROCEDURE — 81003 URINALYSIS AUTO W/O SCOPE: CPT | Performed by: EMERGENCY MEDICINE

## 2021-01-09 PROCEDURE — 83036 HEMOGLOBIN GLYCOSYLATED A1C: CPT | Performed by: STUDENT IN AN ORGANIZED HEALTH CARE EDUCATION/TRAINING PROGRAM

## 2021-01-09 PROCEDURE — C9113 INJ PANTOPRAZOLE SODIUM, VIA: HCPCS | Performed by: EMERGENCY MEDICINE

## 2021-01-09 PROCEDURE — 74177 CT ABD & PELVIS W/CONTRAST: CPT | Performed by: EMERGENCY MEDICINE

## 2021-01-09 PROCEDURE — 99285 EMERGENCY DEPT VISIT HI MDM: CPT | Performed by: EMERGENCY MEDICINE

## 2021-01-09 PROCEDURE — 96375 TX/PRO/DX INJ NEW DRUG ADDON: CPT | Performed by: EMERGENCY MEDICINE

## 2021-01-09 PROCEDURE — 85025 COMPLETE CBC W/AUTO DIFF WBC: CPT | Performed by: EMERGENCY MEDICINE

## 2021-01-09 PROCEDURE — 80053 COMPREHEN METABOLIC PANEL: CPT | Performed by: EMERGENCY MEDICINE

## 2021-01-09 PROCEDURE — 96376 TX/PRO/DX INJ SAME DRUG ADON: CPT | Performed by: EMERGENCY MEDICINE

## 2021-01-09 PROCEDURE — 84132 ASSAY OF SERUM POTASSIUM: CPT | Performed by: INTERNAL MEDICINE

## 2021-01-09 PROCEDURE — 96361 HYDRATE IV INFUSION ADD-ON: CPT | Performed by: EMERGENCY MEDICINE

## 2021-01-09 PROCEDURE — 96374 THER/PROPH/DIAG INJ IV PUSH: CPT | Performed by: EMERGENCY MEDICINE

## 2021-01-09 PROCEDURE — 93005 ELECTROCARDIOGRAM TRACING: CPT

## 2021-01-09 PROCEDURE — 82962 GLUCOSE BLOOD TEST: CPT

## 2021-01-09 PROCEDURE — 83690 ASSAY OF LIPASE: CPT | Performed by: EMERGENCY MEDICINE

## 2021-01-09 PROCEDURE — 93010 ELECTROCARDIOGRAM REPORT: CPT | Performed by: EMERGENCY MEDICINE

## 2021-01-09 RX ORDER — METOCLOPRAMIDE 10 MG/1
10 TABLET ORAL 3 TIMES DAILY PRN
Status: DISCONTINUED | OUTPATIENT
Start: 2021-01-09 | End: 2021-01-09

## 2021-01-09 RX ORDER — SODIUM CHLORIDE 9 MG/ML
INJECTION, SOLUTION INTRAVENOUS CONTINUOUS
Status: DISCONTINUED | OUTPATIENT
Start: 2021-01-09 | End: 2021-01-12

## 2021-01-09 RX ORDER — ONDANSETRON 2 MG/ML
4 INJECTION INTRAMUSCULAR; INTRAVENOUS ONCE
Status: COMPLETED | OUTPATIENT
Start: 2021-01-09 | End: 2021-01-09

## 2021-01-09 RX ORDER — ACETAMINOPHEN 325 MG/1
650 TABLET ORAL EVERY 6 HOURS PRN
Status: DISCONTINUED | OUTPATIENT
Start: 2021-01-09 | End: 2021-01-12

## 2021-01-09 RX ORDER — DIPHENHYDRAMINE HYDROCHLORIDE 50 MG/ML
25 INJECTION INTRAMUSCULAR; INTRAVENOUS EVERY 6 HOURS PRN
Status: DISCONTINUED | OUTPATIENT
Start: 2021-01-09 | End: 2021-01-12

## 2021-01-09 RX ORDER — ONDANSETRON 2 MG/ML
INJECTION INTRAMUSCULAR; INTRAVENOUS
Status: DISPENSED
Start: 2021-01-09 | End: 2021-01-09

## 2021-01-09 RX ORDER — MULTIPLE VITAMINS W/ MINERALS TAB 9MG-400MCG
1 TAB ORAL DAILY
Status: DISCONTINUED | OUTPATIENT
Start: 2021-01-09 | End: 2021-01-12

## 2021-01-09 RX ORDER — PANTOPRAZOLE SODIUM 40 MG/1
40 TABLET, DELAYED RELEASE ORAL DAILY
Status: DISCONTINUED | OUTPATIENT
Start: 2021-01-09 | End: 2021-01-12

## 2021-01-09 RX ORDER — MORPHINE SULFATE 4 MG/ML
1 INJECTION, SOLUTION INTRAMUSCULAR; INTRAVENOUS EVERY 4 HOURS PRN
Status: DISCONTINUED | OUTPATIENT
Start: 2021-01-09 | End: 2021-01-12

## 2021-01-09 RX ORDER — HYDROCODONE BITARTRATE AND ACETAMINOPHEN 10; 325 MG/1; MG/1
1 TABLET ORAL EVERY 6 HOURS PRN
Status: DISCONTINUED | OUTPATIENT
Start: 2021-01-09 | End: 2021-01-12

## 2021-01-09 RX ORDER — DEXTROSE MONOHYDRATE 25 G/50ML
50 INJECTION, SOLUTION INTRAVENOUS
Status: DISCONTINUED | OUTPATIENT
Start: 2021-01-09 | End: 2021-01-12

## 2021-01-09 RX ORDER — ACETAMINOPHEN 325 MG/1
650 TABLET ORAL EVERY 6 HOURS PRN
Status: DISCONTINUED | OUTPATIENT
Start: 2021-01-09 | End: 2021-01-09

## 2021-01-09 RX ORDER — METOCLOPRAMIDE 10 MG/1
10 TABLET ORAL
Status: DISCONTINUED | OUTPATIENT
Start: 2021-01-09 | End: 2021-01-12

## 2021-01-09 RX ORDER — LIDOCAINE HYDROCHLORIDE 20 MG/ML
10 SOLUTION OROPHARYNGEAL ONCE
Status: COMPLETED | OUTPATIENT
Start: 2021-01-09 | End: 2021-01-09

## 2021-01-09 RX ORDER — ALBUTEROL SULFATE 90 UG/1
2 AEROSOL, METERED RESPIRATORY (INHALATION) EVERY 4 HOURS PRN
Status: DISCONTINUED | OUTPATIENT
Start: 2021-01-09 | End: 2021-01-12

## 2021-01-09 RX ORDER — ATORVASTATIN CALCIUM 10 MG/1
10 TABLET, FILM COATED ORAL NIGHTLY
Status: DISCONTINUED | OUTPATIENT
Start: 2021-01-09 | End: 2021-01-12

## 2021-01-09 RX ORDER — MAGNESIUM HYDROXIDE/ALUMINUM HYDROXICE/SIMETHICONE 120; 1200; 1200 MG/30ML; MG/30ML; MG/30ML
30 SUSPENSION ORAL ONCE
Status: COMPLETED | OUTPATIENT
Start: 2021-01-09 | End: 2021-01-09

## 2021-01-09 RX ORDER — PANTOPRAZOLE SODIUM 20 MG/1
20 TABLET, DELAYED RELEASE ORAL DAILY
Status: DISCONTINUED | OUTPATIENT
Start: 2021-01-09 | End: 2021-01-09

## 2021-01-09 RX ORDER — POTASSIUM CHLORIDE 20 MEQ/1
40 TABLET, EXTENDED RELEASE ORAL EVERY 4 HOURS
Status: COMPLETED | OUTPATIENT
Start: 2021-01-09 | End: 2021-01-09

## 2021-01-09 RX ORDER — ONDANSETRON 2 MG/ML
4 INJECTION INTRAMUSCULAR; INTRAVENOUS EVERY 6 HOURS PRN
Status: DISCONTINUED | OUTPATIENT
Start: 2021-01-09 | End: 2021-01-12

## 2021-01-09 RX ORDER — HEPARIN SODIUM 5000 [USP'U]/ML
5000 INJECTION, SOLUTION INTRAVENOUS; SUBCUTANEOUS EVERY 8 HOURS SCHEDULED
Status: DISCONTINUED | OUTPATIENT
Start: 2021-01-09 | End: 2021-01-12

## 2021-01-09 NOTE — CONSULTS
[address]  Report of GI Consultation    Jona Lara Patient Status:  Observation    1945 MRN [id number]   [facility] 0SW-A Attending Mary Anne Patel MD   Hosp Day # 0 PCP Kayleen Tomas MD     Date of Admission:   TONSILLECTOMY     • TOTAL ABDOM HYSTERECTOMY         Family History  Family History   Problem Relation Age of Onset   • Ovarian Cancer Mother 68   • Uterine Cancer Mother    • Stroke Mother    • Breast Cancer Sister 61   • Breast Cancer Sister 36   • Joesph chewable tab 8 tablet, 8 tablet, Oral, Q15 Min PRN    •  Insulin Aspart Pen (NOVOLOG) 100 UNIT/ML flexpen 1-5 Units, 1-5 Units, Subcutaneous, TID CC and HS      Allergies    Augmentin [Amoxicil*    ANGIOEDEMA    Comment:Per patient, tolerates cefdinir  [facility] or pelvic process noted. 2. Suggestion of cirrhotic changes of the liver with multiple calcified granulomas. 3. Chronic scarring and bronchiectasis right lower lobe suggested with mild effusion. Findings are similar to prior imaging.   Superimposed infecti

## 2021-01-09 NOTE — H&P
[facility] Patient Status:  Observation    1945 MRN [id number]   [facility] 0SW-A Attending Magi Gonzalez MD   Hosp Day # 0 PCP Thu Lyn MD     Date:  2021  Date of Admissi HYSTERECTOMY     • MARIA LUZ BIOPSY STEREO NODULE 2 SITE BILAT (CPT=19081/99679)      2015   • TONSILLECTOMY     • TOTAL ABDOM HYSTERECTOMY       Family History   Problem Relation Age of Onset   • Ovarian Cancer Mother 68   • Uterine Cancer Mother    • Stroke Mo Sodium 40 MG Oral Tab EC, Take 1 tablet (40 mg total) by mouth daily. Review of Systems:   Pertinent items are noted in HPI. A comprehensive 10 point review of systems was completed. Pertinent positives and negatives noted in the the HPI.    Vital 12/17/2020       Ct Abdomen Pelvis Iv Contrast, No Oral (er)    Result Date: 1/9/2021  CONCLUSION:  1. No acute intra-abdominal or pelvic process noted. 2. Suggestion of cirrhotic changes of the liver with multiple calcified granulomas.  3. Chronic scarring without mass or duct dilation. SPLEEN:  No enlargement or focal lesion. KIDNEYS:  There are small bilateral renal cysts the largest on the right measuring 1.8 cm. There is a 4 mm nonobstructing calculus upper pole left kidney. No hydronephrosis.  ADRENAL nausea and vomiting for about 3 months. She has a hard time keeping foods down and has lost about 20 pounds in 3 months. History of COPD. FLUOROSCOPY IMAGES OBTAINED:  10 FLUOROSCOPY TIME:  1 minute, 28 seconds RADIATION DOSE (AIR KERMA PRODUCT):  429. includes the Dose Index Registry. PATIENT STATED HISTORY:(As transcribed by Technologist)  Right and left upper quadrant pain with vomiting for 2 weeks.    CONTRAST USED:  100cc of Omnipaque 350  FINDINGS:  LIVER:  There are multiple calcified granulomas o 3. Chronic scarring and bronchiectasis right lower lobe suggested with mild effusion. Findings are similar to prior imaging. Superimposed infection/pneumonia cannot be excluded based on imaging. Please correlate clinically.  4. Please see the body of the reviewed  Discussed with nursing on floor  dvt prophylaxis reviewed  PT and/or OT  Yazan Bonilla MD  1/9/2021

## 2021-01-09 NOTE — PLAN OF CARE
Problem: Diabetes/Glucose Control  Goal: Glucose maintained within prescribed range  Description: INTERVENTIONS:  - Monitor Blood Glucose as ordered  - Assess for signs and symptoms of hyperglycemia and hypoglycemia  - Administer ordered medications to m adequate comfort level or patient's stated pain goal  Description: INTERVENTIONS:  - Encourage pt to monitor pain and request assistance  - Assess pain using appropriate pain scale  - Administer analgesics based on type and severity of pain and evaluate re their own health  - Refer to Case Management Department for coordinating discharge planning if the patient needs post-hospital services based on physician/LIP order or complex needs related to functional status, cognitive ability or social support system

## 2021-01-09 NOTE — ED PROVIDER NOTES
Patient Seen in: [facility] Emergency Department      History   Patient presents with:  Abdomen/Flank Pain  Nausea/Vomiting/Diarrhea    Stated Complaint: abdominal pain, vomiting.      HPI/Subjective:   HPI    Patient is a 29-year-old female presents Never Used    Alcohol use: Never      Frequency: Never    Drug use: Never             Review of Systems    Positive for stated complaint: abdominal pain, vomiting. Other systems are as noted in HPI. Constitutional and vital signs reviewed.       All othe 0.7 (*)     All other components within normal limits   LIPASE - Abnormal; Notable for the following components:    Lipase 49 (*)     All other components within normal limits   CBC W/ DIFFERENTIAL - Abnormal; Notable for the following components:    WBC 1 Nonsurgical abdomen. Offered admission to hospital versus going home and patient preferred to come to the hospital.  Case discussed with primary, Dr. Lissy Benavides. MDM      Patient is a 67-year-old female presents with abdominal pain, vomiting.   Diff

## 2021-01-09 NOTE — PLAN OF CARE
NURSING ADMISSION NOTE      Patient admitted via Wheelchair  Oriented to room. Safety precautions initiated. Bed in low position. Call light in reach. Pt a&ox4 on assessment, VSS, pt afebrile.  Reporting severe pain to abdomen that she's been expe

## 2021-01-10 ENCOUNTER — APPOINTMENT (OUTPATIENT)
Dept: NUCLEAR MEDICINE | Facility: HOSPITAL | Age: 76
DRG: 074 | End: 2021-01-10
Attending: STUDENT IN AN ORGANIZED HEALTH CARE EDUCATION/TRAINING PROGRAM
Payer: MEDICARE

## 2021-01-10 LAB
GLUCOSE BLD-MCNC: 76 MG/DL (ref 70–99)
GLUCOSE BLD-MCNC: 79 MG/DL (ref 70–99)
GLUCOSE BLD-MCNC: 83 MG/DL (ref 70–99)
GLUCOSE BLD-MCNC: 86 MG/DL (ref 70–99)
POTASSIUM SERPL-SCNC: 3.7 MMOL/L (ref 3.5–5.1)

## 2021-01-10 PROCEDURE — 78227 HEPATOBIL SYST IMAGE W/DRUG: CPT | Performed by: STUDENT IN AN ORGANIZED HEALTH CARE EDUCATION/TRAINING PROGRAM

## 2021-01-10 PROCEDURE — 82962 GLUCOSE BLOOD TEST: CPT

## 2021-01-10 PROCEDURE — 78226 HEPATOBILIARY SYSTEM IMAGING: CPT | Performed by: STUDENT IN AN ORGANIZED HEALTH CARE EDUCATION/TRAINING PROGRAM

## 2021-01-10 RX ORDER — POTASSIUM CHLORIDE 20 MEQ/1
40 TABLET, EXTENDED RELEASE ORAL ONCE
Status: COMPLETED | OUTPATIENT
Start: 2021-01-10 | End: 2021-01-10

## 2021-01-10 RX ORDER — LORAZEPAM 2 MG/ML
0.5 INJECTION INTRAMUSCULAR EVERY 6 HOURS PRN
Status: DISCONTINUED | OUTPATIENT
Start: 2021-01-10 | End: 2021-01-12

## 2021-01-10 NOTE — PLAN OF CARE
A & o x4, denies any nausea. Passing gas, unable to have bm. Aware sample needed. Voiding freely. Rt IV PIV infusing 0.9, clear liquid diet. 2L/min NC baseline. Ambulate with assist. C/o chronic back pain- Norco given.  Made NPO for HIDA scan; notified vitaliy ADULT  Goal: Verbalizes/displays adequate comfort level or patient's stated pain goal  Description: INTERVENTIONS:  - Encourage pt to monitor pain and request assistance  - Assess pain using appropriate pain scale  - Administer analgesics based on type and to be responsible for managing their own health  - Refer to Case Management Department for coordinating discharge planning if the patient needs post-hospital services based on physician/LIP order or complex needs related to functional status, cognitive irvin

## 2021-01-10 NOTE — PROGRESS NOTES
[facility]  Progress Note    Chirag Markham Patient Status:  Observation    1945 MRN [id number]   [facility] 0SW-A Attending Leah Quintana MD   Hosp Day # 0 PCP Amparo Hampton MD         SUBJECTIVE:  Subjective:  Tracy Schwab 01/09/21.     Ct Abdomen+pelvis(contrast Only)(cpt=74177)    Result Date: 12/17/2020  PROCEDURE:  CT ABDOMEN+PELVIS (CONTRAST ONLY) (CPT=74177)  COMPARISON:  [facility] , CT, CT ABDOMEN+PELVIS(CPT=74176), 9/26/2020, 5:16 PM.  INDICATIONS:  nvd  TECHNIQUE:  CT s and to a lesser extent hip joints. No fracture. LUNG BASES:  Again noted are low lung volumes within the right lung base with chronic parenchymal changes. There is mild elevation of the right hemidiaphragm. OTHER:  Negative. CONCLUSION:  1.  Nathalie Barkley mucosal pattern. There is no evidence of ulcers or intrinsic or extrinsic masses. The duodenal bulb is without deformity or ulceration. The remainder of the visualized proximal small bowel is unremarkable. CONCLUSION: 1.  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 Exophytic simple left midpole renal cyst measuring 1.0 cm. Nonobstructing calcified stone along the superior pole left kidney measuring 3 mm. ADRENALS:  No mass or enlargement.   AORTA/VASCULAR:  No Subcutaneous Q8H [address]   • Insulin Aspart Pen  1-5 Units Subcutaneous TID CC and HS     • sodium chloride 83 mL/hr at 01/10/21 0649     ondansetron HCl, morphINE sulfate, acetaminophen, Albuterol Sulfate HFA, HYDROcodone-acetaminophen, diphenhydrAMINE HCl, g necessary        Hypertension–losartan     COPD–brep     GERD–pantoprazole     Dyslipidemia–statin     DVT prophylaxis–subcu heparin     Leukocytosis–rule out infection, monitor     Anemia–anemia of chronic disease, monitor H&H     Hypokalemia–replace per

## 2021-01-10 NOTE — PROGRESS NOTES
[facility]  Report of GI Progress Note    Mumtaz Interiano Patient Status:  Observation    1945 MRN [id number]   [facility] 0SW-A Attending Jayleen Delgado MD   Hosp Day # 0 PCP Juanita Royal MD     Date of Admission:   tablet, 4 tablet, Oral, Q15 Min PRN    Or    •  dextrose 50 % injection 50 mL, 50 mL, Intravenous, Q15 Min PRN    Or    •  glucose (DEX4) oral liquid 30 g, 30 g, Oral, Q15 Min PRN    Or    •  Glucose-Vitamin C (DEX-4) chewable tab 8 tablet, 8 tablet, Oral, 2. Suggestion of cirrhotic changes of the liver with multiple calcified granulomas. 3. Chronic scarring and bronchiectasis right lower lobe suggested with mild effusion. Findings are similar to prior imaging.   Superimposed infection/pneumonia cannot be ex

## 2021-01-10 NOTE — PLAN OF CARE
Pt a/ox4 upon assessment, 2L NC per baseline, C/o abd pain - meds given see mar. Accu QID no coverage needed at this time. Bowel sounds present, lungs clear and diminished. Voids. Awaiting stool sample. Denies n/v at this time. Tolerating diet.  Pt complian adequate hydration with IV or PO as ordered and tolerated  - Evaluate effectiveness of GI medications  - Encourage mobilization and activity  - Obtain nutritional consult as needed  - Establish a toileting routine/schedule  - Consider collaborating with ph planning  - Arrange for needed discharge resources and transportation as appropriate  - Identify discharge learning needs (meds, wound care, etc)  - Arrange for interpreters to assist at discharge as needed  - Consider post-discharge preferences of patient

## 2021-01-11 LAB
ALBUMIN SERPL-MCNC: 2.7 G/DL (ref 3.4–5)
ALBUMIN/GLOB SERPL: 0.7 {RATIO} (ref 1–2)
ALP LIVER SERPL-CCNC: 93 U/L
ALT SERPL-CCNC: 15 U/L
ANION GAP SERPL CALC-SCNC: 5 MMOL/L (ref 0–18)
AST SERPL-CCNC: 27 U/L (ref 15–37)
BASOPHILS # BLD AUTO: 0.05 X10(3) UL (ref 0–0.2)
BASOPHILS NFR BLD AUTO: 0.5 %
BILIRUB SERPL-MCNC: 0.6 MG/DL (ref 0.1–2)
BUN BLD-MCNC: 4 MG/DL (ref 7–18)
BUN/CREAT SERPL: 5.6 (ref 10–20)
CALCIUM BLD-MCNC: 8.6 MG/DL (ref 8.5–10.1)
CHLORIDE SERPL-SCNC: 109 MMOL/L (ref 98–112)
CO2 SERPL-SCNC: 28 MMOL/L (ref 21–32)
CREAT BLD-MCNC: 0.71 MG/DL
DEPRECATED RDW RBC AUTO: 47 FL (ref 35.1–46.3)
EOSINOPHIL # BLD AUTO: 0.32 X10(3) UL (ref 0–0.7)
EOSINOPHIL NFR BLD AUTO: 2.9 %
ERYTHROCYTE [DISTWIDTH] IN BLOOD BY AUTOMATED COUNT: 13.8 % (ref 11–15)
GLOBULIN PLAS-MCNC: 4.1 G/DL (ref 2.8–4.4)
GLUCOSE BLD-MCNC: 108 MG/DL (ref 70–99)
GLUCOSE BLD-MCNC: 83 MG/DL (ref 70–99)
GLUCOSE BLD-MCNC: 87 MG/DL (ref 70–99)
GLUCOSE BLD-MCNC: 91 MG/DL (ref 70–99)
GLUCOSE BLD-MCNC: 92 MG/DL (ref 70–99)
HCT VFR BLD AUTO: 36.4 %
HGB BLD-MCNC: 10.8 G/DL
IMM GRANULOCYTES # BLD AUTO: 0.02 X10(3) UL (ref 0–1)
IMM GRANULOCYTES NFR BLD: 0.2 %
LYMPHOCYTES # BLD AUTO: 6.37 X10(3) UL (ref 1–4)
LYMPHOCYTES NFR BLD AUTO: 57.7 %
M PROTEIN MFR SERPL ELPH: 6.8 G/DL (ref 6.4–8.2)
MCH RBC QN AUTO: 27.7 PG (ref 26–34)
MCHC RBC AUTO-ENTMCNC: 29.7 G/DL (ref 31–37)
MCV RBC AUTO: 93.3 FL
MONOCYTES # BLD AUTO: 0.78 X10(3) UL (ref 0.1–1)
MONOCYTES NFR BLD AUTO: 7.1 %
NEUTROPHILS # BLD AUTO: 3.5 X10 (3) UL (ref 1.5–7.7)
NEUTROPHILS # BLD AUTO: 3.5 X10(3) UL (ref 1.5–7.7)
NEUTROPHILS NFR BLD AUTO: 31.6 %
OSMOLALITY SERPL CALC.SUM OF ELEC: 290 MOSM/KG (ref 275–295)
PLATELET # BLD AUTO: 307 10(3)UL (ref 150–450)
POTASSIUM SERPL-SCNC: 3.4 MMOL/L (ref 3.5–5.1)
POTASSIUM SERPL-SCNC: 3.9 MMOL/L (ref 3.5–5.1)
RBC # BLD AUTO: 3.9 X10(6)UL
SODIUM SERPL-SCNC: 142 MMOL/L (ref 136–145)
WBC # BLD AUTO: 11 X10(3) UL (ref 4–11)

## 2021-01-11 PROCEDURE — 80053 COMPREHEN METABOLIC PANEL: CPT | Performed by: INTERNAL MEDICINE

## 2021-01-11 PROCEDURE — 85025 COMPLETE CBC W/AUTO DIFF WBC: CPT | Performed by: INTERNAL MEDICINE

## 2021-01-11 PROCEDURE — 84132 ASSAY OF SERUM POTASSIUM: CPT | Performed by: INTERNAL MEDICINE

## 2021-01-11 PROCEDURE — 82962 GLUCOSE BLOOD TEST: CPT

## 2021-01-11 RX ORDER — DOCUSATE SODIUM 100 MG/1
100 CAPSULE, LIQUID FILLED ORAL 2 TIMES DAILY
Status: DISCONTINUED | OUTPATIENT
Start: 2021-01-11 | End: 2021-01-12

## 2021-01-11 RX ORDER — POLYETHYLENE GLYCOL 3350 17 G/17G
17 POWDER, FOR SOLUTION ORAL 2 TIMES DAILY
Status: DISCONTINUED | OUTPATIENT
Start: 2021-01-11 | End: 2021-01-12

## 2021-01-11 RX ORDER — POTASSIUM CHLORIDE 20 MEQ/1
40 TABLET, EXTENDED RELEASE ORAL EVERY 4 HOURS
Status: COMPLETED | OUTPATIENT
Start: 2021-01-11 | End: 2021-01-11

## 2021-01-11 NOTE — PLAN OF CARE
A&Ox4, VSS, lungs clear bilaterally/diminished in bases, breathing regular/non-labored on 2L NC same as home, abd is soft, flat, non-tender, bowel sounds active, pt reports passing gas/unable to produce bm at this time, denies nausea at this time.  Voiding side effects  - Notify MD/LIP if interventions unsuccessful or patient reports new pain  - Anticipate increased pain with activity and pre-medicate as appropriate  Outcome: Progressing     Problem: SAFETY ADULT - FALL  Goal: Free from fall injury  Descript

## 2021-01-11 NOTE — PROGRESS NOTES
Gastroenterology Progress Note  Aj Harika Patient Status:  Observation    1945 MRN [id number]   [facility] 0SW-A Attending Ramon Ballard MD   Hosp Day # 0 PCP Magalie Chen, 2.0 mcg CCK     FINDINGS:    There is radiotracer uptake throughout the liver with biliary, gallbladder and small bowel uptake by 60 minutes.      GBEF:                     83 % (Normal >35%)  COMMENTS:                 Patient's pain level was a 9 on a sca

## 2021-01-11 NOTE — CM/SW NOTE
01/11/21 1000   CM/SW Referral Data   Referral Source Physician   Reason for Referral Discharge planning   Informant Patient   Social History   Recreational Drug/Alcohol Use no   Major Changes Last 6 Months no   Domestic/Partner Violence no   Suicidal I Lanny is a 8 month old F with medical history of recent hospitalizations for R/E bronchiolitis and aspiration pneumonia who is currently being managed for difficulty feeding s/p PICU stay for respiratory distress requiring CPAP in the setting of negative RVP and CXR findings likely 2/2 aspiration pneumonitis. 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 She is overall clinically stable with significant improvement in respiratory status. She is currently on an oral diet of puree consistency with formula thickened (ratio 1.5 teaspoons cereal to 1 ounce fluids - as per nutrition) that she is able to tolerate, however, is unable to take the entire volume of 24 oz for the day.

## 2021-01-11 NOTE — PLAN OF CARE
Problem: Diabetes/Glucose Control  Goal: Glucose maintained within prescribed range  Description: INTERVENTIONS:  - Monitor Blood Glucose as ordered  - Assess for signs and symptoms of hyperglycemia and hypoglycemia  - Administer ordered medications to m or patient's stated pain goal  Description: INTERVENTIONS:  - Encourage pt to monitor pain and request assistance  - Assess pain using appropriate pain scale  - Administer analgesics based on type and severity of pain and evaluate response  - Implement non Refer to Case Management Department for coordinating discharge planning if the patient needs post-hospital services based on physician/LIP order or complex needs related to functional status, cognitive ability or social support system  Outcome: Not Progres

## 2021-01-11 NOTE — DIETARY NOTE
[facility]     Admitting diagnosis:  Abdominal pain of unknown etiology [R10.9]  Nausea and vomiting, intractability of vomiting not specified, unspecified vomiting type [R11.2]    Ht:  5' 4.5\"  Wt: 90 kg (

## 2021-01-11 NOTE — PROGRESS NOTES
[address] [facility] INTERNIST  Progress Note     Kenyatta Jim Patient Status:  Observation    1945 MRN [id number]   [facility] 0SW-A Attending Richard Ndiaye MD   Hosp Day # 0 PCP Estela Pennington MD     Chief Complaint: abdominal pain at 4:34 PM     Finalized by (CST): Nadia Cook MD on 1/10/2021 at 4:36 PM         Medications:   • Potassium Chloride ER  40 mEq Oral Q4H   • PEG 3350  17 g Oral BID   • docusate sodium  100 mg Oral BID   • atorvastatin  10 mg Oral Nightly   • Metoclopr

## 2021-01-12 VITALS
SYSTOLIC BLOOD PRESSURE: 141 MMHG | TEMPERATURE: 98 F | HEART RATE: 105 BPM | DIASTOLIC BLOOD PRESSURE: 75 MMHG | RESPIRATION RATE: 18 BRPM | OXYGEN SATURATION: 97 % | WEIGHT: 198.44 LBS | BODY MASS INDEX: 34 KG/M2

## 2021-01-12 LAB
ALBUMIN SERPL-MCNC: 2.8 G/DL (ref 3.4–5)
ALBUMIN/GLOB SERPL: 0.7 {RATIO} (ref 1–2)
ALP LIVER SERPL-CCNC: 99 U/L
ALT SERPL-CCNC: 14 U/L
ANION GAP SERPL CALC-SCNC: 4 MMOL/L (ref 0–18)
AST SERPL-CCNC: 24 U/L (ref 15–37)
BASOPHILS # BLD AUTO: 0.06 X10(3) UL (ref 0–0.2)
BASOPHILS NFR BLD AUTO: 0.5 %
BILIRUB SERPL-MCNC: 0.5 MG/DL (ref 0.1–2)
BUN BLD-MCNC: 4 MG/DL (ref 7–18)
BUN/CREAT SERPL: 5.1 (ref 10–20)
CALCIUM BLD-MCNC: 8.7 MG/DL (ref 8.5–10.1)
CHLORIDE SERPL-SCNC: 109 MMOL/L (ref 98–112)
CO2 SERPL-SCNC: 25 MMOL/L (ref 21–32)
CREAT BLD-MCNC: 0.79 MG/DL
DEPRECATED RDW RBC AUTO: 47.8 FL (ref 35.1–46.3)
EOSINOPHIL # BLD AUTO: 0.24 X10(3) UL (ref 0–0.7)
EOSINOPHIL NFR BLD AUTO: 2.1 %
ERYTHROCYTE [DISTWIDTH] IN BLOOD BY AUTOMATED COUNT: 13.9 % (ref 11–15)
ESTIMATED AVERAGE GLUCOSE: 111 MG/DL
GLOBULIN PLAS-MCNC: 4.1 G/DL (ref 2.8–4.4)
GLUCOSE BLD-MCNC: 100 MG/DL (ref 70–99)
GLUCOSE BLD-MCNC: 95 MG/DL (ref 70–99)
GLUCOSE BLD-MCNC: 97 MG/DL (ref 70–99)
HCT VFR BLD AUTO: 36.9 %
HEMOGLOBIN A1C: 5.5 %
HGB BLD-MCNC: 11 G/DL
IMM GRANULOCYTES # BLD AUTO: 0.03 X10(3) UL (ref 0–1)
IMM GRANULOCYTES NFR BLD: 0.3 %
LYMPHOCYTES # BLD AUTO: 6.26 X10(3) UL (ref 1–4)
LYMPHOCYTES NFR BLD AUTO: 55.3 %
M PROTEIN MFR SERPL ELPH: 6.9 G/DL (ref 6.4–8.2)
MCH RBC QN AUTO: 27.4 PG (ref 26–34)
MCHC RBC AUTO-ENTMCNC: 29.8 G/DL (ref 31–37)
MCV RBC AUTO: 92 FL
MONOCYTES # BLD AUTO: 0.92 X10(3) UL (ref 0.1–1)
MONOCYTES NFR BLD AUTO: 8.1 %
NEUTROPHILS # BLD AUTO: 3.82 X10 (3) UL (ref 1.5–7.7)
NEUTROPHILS # BLD AUTO: 3.82 X10(3) UL (ref 1.5–7.7)
NEUTROPHILS NFR BLD AUTO: 33.7 %
OSMOLALITY SERPL CALC.SUM OF ELEC: 283 MOSM/KG (ref 275–295)
PLATELET # BLD AUTO: 302 10(3)UL (ref 150–450)
POTASSIUM SERPL-SCNC: 4 MMOL/L (ref 3.5–5.1)
RBC # BLD AUTO: 4.01 X10(6)UL
SODIUM SERPL-SCNC: 138 MMOL/L (ref 136–145)
WBC # BLD AUTO: 11.3 X10(3) UL (ref 4–11)

## 2021-01-12 PROCEDURE — 87338 HPYLORI STOOL AG IA: CPT | Performed by: STUDENT IN AN ORGANIZED HEALTH CARE EDUCATION/TRAINING PROGRAM

## 2021-01-12 PROCEDURE — 85025 COMPLETE CBC W/AUTO DIFF WBC: CPT | Performed by: INTERNAL MEDICINE

## 2021-01-12 PROCEDURE — 80053 COMPREHEN METABOLIC PANEL: CPT | Performed by: STUDENT IN AN ORGANIZED HEALTH CARE EDUCATION/TRAINING PROGRAM

## 2021-01-12 PROCEDURE — 82962 GLUCOSE BLOOD TEST: CPT

## 2021-01-12 RX ORDER — PSEUDOEPHEDRINE HCL 30 MG
100 TABLET ORAL 2 TIMES DAILY
Qty: 60 CAPSULE | Refills: 3 | Status: SHIPPED | OUTPATIENT
Start: 2021-01-12

## 2021-01-12 RX ORDER — METFORMIN HYDROCHLORIDE 500 MG/1
500 TABLET, EXTENDED RELEASE ORAL
COMMUNITY

## 2021-01-12 RX ORDER — METOCLOPRAMIDE 10 MG/1
10 TABLET ORAL
Qty: 90 TABLET | Refills: 0 | Status: SHIPPED | OUTPATIENT
Start: 2021-01-12 | End: 2021-02-02 | Stop reason: ALTCHOICE

## 2021-01-12 RX ORDER — POLYETHYLENE GLYCOL 3350 17 G/17G
17 POWDER, FOR SOLUTION ORAL DAILY PRN
Qty: 30 EACH | Refills: 3 | Status: SHIPPED | OUTPATIENT
Start: 2021-01-12

## 2021-01-12 NOTE — PROGRESS NOTES
Pt resting in bed, easy non labored on 02 2l nc. cpox in place. Pt tolerating soft diet. Voids adequate amount. Iv fluids infusing without difficulty. Plan of care discussed. Instructed to call if any needs arise. Call light within reach.

## 2021-01-12 NOTE — PROGRESS NOTES
[facility] INTERNIST  Progress Note     Levora Analia Patient Status:  Observation    1945 MRN [id number]   [facility] 0SW-A Attending Steff Ashton MD   Hosp Day # 1 PCP Génesis Benton MD     Chief Complaint: abdominal pain (cpt=78227)    Result Date: 1/10/2021  CONCLUSION:  Normal gallbladder ejection fraction of 83%. Patient was experiencing pain prior to CCK infusion which did not change following CCK infusion.    Dictated by (CST): Jorge Arita MD on 1/10/2021 at 4:34 P H&H     Hypokalemia–replace per protocol    Disposition per      See tests ordered,  Available and radiology reviewed  All consultant notes reviewed  Discussed with nursing on floor  dvt prophylaxis reviewed  PT and/or OT     ABBY Wolfe

## 2021-01-12 NOTE — PLAN OF CARE
Patient A/O X 4, VSS, up ad sukumar in room. Patient having multiple bowel movements today. Denies pain. DC planned for today.      Problem: Diabetes/Glucose Control  Goal: Glucose maintained within prescribed range  Description: INTERVENTIONS:  - Monitor Blood profile  Outcome: Progressing     Problem: PAIN - ADULT  Goal: Verbalizes/displays adequate comfort level or patient's stated pain goal  Description: INTERVENTIONS:  - Encourage pt to monitor pain and request assistance  - Assess pain using appropriate cal

## 2021-01-12 NOTE — DISCHARGE SUMMARY
[facility]  Discharge Summary    [place] Cynthia Patient Status:  Inpatient    1945 MRN [id number]   [facility] 0SW-A Attending Oleksandr Messina MD   [facility] Day # 1 PCP Russ Hwang MD     Date of Admission: 2021    Date CONTRAST USED:  100cc of Omnipaque 350  FINDINGS:  LIVER:  There are small calcifications within the liver consistent with previous granulomatous disease. There is mild irregularity of the contour of the liver may represent cirrhotic changes.   No suspicio (CPT=74240)  TECHNIQUE:  A single contrast upper gastrointestinal series was performed in the usual manner. A  abdominal radiograph was performed.   COMPARISON:  [facility] , XR, UGI W/ SM BOWEL (WITH AIR), 10/06/2005, 8:23 AM.  INDICATIONS:  R11.12 Proje mebrofenin was injected IV, and dynamic images of the abdomen were obtained in the anterior projection for one hour.   This was followed by IV administration of CCK over 40 minutes followed by additional total of 45 minutes of dynamic anterior abdominal silver somewhat nodular contour to the liver suspicious for potential hepatic cirrhosis/hepatocellular disease. Please correlate clinically. BILIARY:  The gallbladder is mildly distended without evidence of genia dilatation or gallbladder wall thickening.   No bi Rich Pettit DO on 1/09/2021 at 8:18 AM       Reason for Admission: see below    Physical Exam: see below    HVital signs:  Temp:  [98.2 °F (36.8 °C)-98.4 °F (36.9 °C)] 98.3 °F (36.8 °C)  Pulse:  [] 105  Resp:  [18] 18  BP: (140-146)/(57-80) 141/75 BID   • docusate sodium  100 mg Oral BID   • atorvastatin  10 mg Oral Nightly   • Metoclopramide HCl  10 mg Oral TID AC and HS   • losartan-hydrochlorothiazide (HYZAAR 100/25) combination tablet ([facility] only)   Oral Daily   • multivitamin with minerals  1 tab times daily. Qty: 60 capsule Refills: 3    PEG 3350 17 g Oral Powd Pack  Take 17 g by mouth daily as needed. Qty: 30 each Refills: 3      CONTINUE these medications which have CHANGED    !!  Metoclopramide HCl 10 MG Oral Tab  Take 1 tablet (10 mg total) b

## 2021-01-12 NOTE — CM/SW NOTE
Spoke with pt over her room phone to follow up on Magaly Akbar at discharge. Pt stated that she is now agreeable to having home health arranged upon discharge, and requests [facility]. Order/F2F entered in [facility] for home care.      Referral sent to [facility]

## 2021-01-12 NOTE — PLAN OF CARE
Pt resting in bed. On 2L oxygen via nc. Lungs clear,no cough noted. Abd soft and tender, pt states she feels bloated, reports flatus, np bm. Ambulating. Poc updated, pt verbalized understanding.       Problem: Diabetes/Glucose Control  Goal: Glucose maintai

## 2021-01-13 NOTE — PAYOR COMM NOTE
--------------  ADMISSION REVIEW     Payor: Carlos Valdez  Subscriber #Adama Mcwilliams  Authorization Number: [id number]    Admit date: 1/9/2021  Admit time: 9777     Admitting Physician:  Consuelo García MD  Attending Physician:  No att. providers found  P • Irregular bowel habits    • Leg swelling    • Nausea    • Osteoarthritis    • Pain in joints    • Pneumonia    • Pneumonia due to organism    • Sarcoidosis    • Shortness of breath    • Sputum production    • Vomiting    • Wears glasses    • Weight loss NEUROLOGICAL:  Motor strength intact all groups.   normal sensation, speech intact    ED Course     Labs Reviewed   URINALYSIS WITH CULTURE REFLEX - Abnormal; Notable for the following components:       Result Value    Urine Color Colorless (*)     All othe ER course: Patient reexamined. Still complaining of 9/10 pain. Nonsurgical abdomen. Offered admission to hospital versus going home and patient preferred to come to the hospital.  Case discussed with primary, Dr. Lissy Benavides.     MDM      Patient is a 72-year- Patient presents with:  Abdomen/Flank Pain  Nausea/Vomiting/Diarrhea    HPI:     41-year-old female presents to ED for evaluation of abdominal pain. Patient complains of intermittent abdominal pain over the last 3 weeks.   Patient had ED visit with a negat Heart:    Regular rate and rhythm, S1 and S2 normal,    Abdomen:     Soft, non-tender, bowel sounds active all four quadrants,                Extremities:    no cyanosis, icterus or edema         Neurologic :                                  General weakne CONCLUSION:  1. No acute intra-abdominal or pelvic process. 2. Incidental findings include evidence of previous granulomatous disease, benign renal cysts, nonobstructing 4 mm calculus upper pole left kidney, uncomplicated diverticulosis.        Xr Upper Gi IV fluids, clear liquids and advance as tolerated, GI consultation, proton pump inhibitor, Zofran when necessary    Hypertension–losartan    COPD–brep    GERD–pantoprazole    Dyslipidemia–statin    DVT prophylaxis–subcu heparin    Leukocytosis–rule out inf  For GI MD patient not interested in invasive testing at this point, because of her pulmonary status, CT scan negative, upper GI barium study neg  GI recommends H. Pylori stool test, HIDA scan, hemoglobin A1c, conservative management, but eventually recomm 0602   WBC 13.2* 11.0   HGB 10.7* 10.8*   MCV 88.2 93.3   .0 307.0     Lab 01/09/21  0047 01/09/21  2340 01/11/21  0602   *  --  87   BUN 6*  --  4*   CREATSERUM 0.92  --  0.71   GFRAA 71  --  97   GFRNAA 62  --  84   CA 8.9  --  8.6   ALB 2. 1/11/2021    INTERNIST Progress Note  Temp:  [98.2 °F (36.8 °C)-98.4 °F (36.9 °C)] 98.3 °F (36.8 °C)  Pulse:  [] 105  Resp:  [18] 18  BP: (140-146)/(57-80) 141/75  Nasal cannula 2 L/min      01/09/21  0047 01/11/21  0602 01/12/21  0757   WBC 13.2* 11 Anemia–anemia of chronic disease, monitor H&H   Hypokalemia–replace per protocol  Disposition per   See tests ordered,  Available and radiology reviewed  All consultant notes reviewed  Discussed with nursing on floor  dvt prophylaxis reviewed Start: 01/09/21 0830 End: 01/12/21 1813    0851-Given        0811-Given        0806-Given        0943-Given   1813-D/C'd      Metoclopramide HCl (REGLAN) tab 10 mg   Dose: 10 mg  Freq: 3 times daily before meals and nightly Route: OR  Start: 01/09/21 1100 (0900)-Not Given   1813-D/C'd      Potassium Chloride ER (K-DUR M20) CR tab 40 mEq   Dose: 40 mEq  Freq: Every 4 hours Route: OR  Start: 01/11/21 0800 End: 01/11/21 1226    Admin Instructions:   Do not crush      0805-Given   1226-Given         Potassium Freq: Every 6 hours PRN Route: IV  PRN Reasons: Anxiety,Agitation  PRN Comment: mild   Start: 01/10/21 1336 End: 01/12/21 1813    Admin Instructions:    For IV use dilute 1:1 with saline     1346-Given         1813-D/C'd        morphINE sulfate (PF) 4 MG/ML

## 2021-01-14 LAB — H PYLORI AG STL QL IA: NEGATIVE

## 2021-01-26 DIAGNOSIS — Z23 NEED FOR VACCINATION: ICD-10-CM

## 2021-04-12 ENCOUNTER — IMMUNIZATION (OUTPATIENT)
Dept: LAB | Age: 76
End: 2021-04-12
Attending: HOSPITALIST
Payer: MEDICARE

## 2021-04-12 DIAGNOSIS — Z23 NEED FOR VACCINATION: Primary | ICD-10-CM

## 2021-04-12 PROCEDURE — 0001A SARSCOV2 VAC 30MCG/0.3ML IM: CPT

## 2021-05-03 ENCOUNTER — IMMUNIZATION (OUTPATIENT)
Dept: LAB | Age: 76
End: 2021-05-03
Attending: HOSPITALIST
Payer: MEDICARE

## 2021-05-03 DIAGNOSIS — Z23 NEED FOR VACCINATION: Primary | ICD-10-CM

## 2021-05-03 PROCEDURE — 0002A SARSCOV2 VAC 30MCG/0.3ML IM: CPT

## 2021-08-10 ENCOUNTER — LAB ENCOUNTER (OUTPATIENT)
Dept: LAB | Age: 76
End: 2021-08-10
Attending: SPECIALIST
Payer: MEDICARE

## 2021-08-10 ENCOUNTER — HOSPITAL ENCOUNTER (OUTPATIENT)
Dept: MAMMOGRAPHY | Age: 76
Discharge: HOME | End: 2021-08-10
Attending: SPECIALIST
Payer: MEDICARE

## 2021-08-10 DIAGNOSIS — Z12.31 ENCOUNTER FOR SCREENING MAMMOGRAM FOR MALIGNANT NEOPLASM OF BREAST: ICD-10-CM

## 2021-08-10 DIAGNOSIS — E07.9 DISEASE OF THYROID GLAND: ICD-10-CM

## 2021-08-10 DIAGNOSIS — N18.9 CHRONIC KIDNEY DISEASE, UNSPECIFIED: ICD-10-CM

## 2021-08-10 DIAGNOSIS — D64.9 ANEMIA, UNSPECIFIED: Primary | ICD-10-CM

## 2021-08-10 DIAGNOSIS — E78.2 MIXED HYPERLIPIDEMIA: ICD-10-CM

## 2021-08-10 LAB
ANION GAP SERPL CALC-SCNC: 4 MMOL/L (ref 0–18)
BASOPHILS # BLD AUTO: 0.05 X10(3) UL (ref 0–0.2)
BASOPHILS NFR BLD AUTO: 0.5 %
BUN BLD-MCNC: 10 MG/DL (ref 7–18)
CALCIUM BLD-MCNC: 9.5 MG/DL (ref 8.5–10.1)
CHLORIDE SERPL-SCNC: 105 MMOL/L (ref 98–112)
CHOLEST SMN-MCNC: 132 MG/DL (ref ?–200)
CO2 SERPL-SCNC: 31 MMOL/L (ref 21–32)
CREAT BLD-MCNC: 0.83 MG/DL
EOSINOPHIL # BLD AUTO: 0.27 X10(3) UL (ref 0–0.7)
EOSINOPHIL NFR BLD AUTO: 2.5 %
ERYTHROCYTE [DISTWIDTH] IN BLOOD BY AUTOMATED COUNT: 13.5 %
GLUCOSE BLD-MCNC: 115 MG/DL (ref 70–99)
HCT VFR BLD AUTO: 35.3 %
HDLC SERPL-MCNC: 45 MG/DL (ref 40–59)
HGB BLD-MCNC: 10.8 G/DL
IMM GRANULOCYTES # BLD AUTO: 0.02 X10(3) UL (ref 0–1)
IMM GRANULOCYTES NFR BLD: 0.2 %
LDLC SERPL CALC-MCNC: 65 MG/DL (ref ?–100)
LYMPHOCYTES # BLD AUTO: 6.04 X10(3) UL (ref 1–4)
LYMPHOCYTES NFR BLD AUTO: 55.3 %
MCH RBC QN AUTO: 27.8 PG (ref 26–34)
MCHC RBC AUTO-ENTMCNC: 30.6 G/DL (ref 31–37)
MCV RBC AUTO: 90.7 FL
MONOCYTES # BLD AUTO: 0.7 X10(3) UL (ref 0.1–1)
MONOCYTES NFR BLD AUTO: 6.4 %
NEUTROPHILS # BLD AUTO: 3.85 X10 (3) UL (ref 1.5–7.7)
NEUTROPHILS # BLD AUTO: 3.85 X10(3) UL (ref 1.5–7.7)
NEUTROPHILS NFR BLD AUTO: 35.1 %
NONHDLC SERPL-MCNC: 87 MG/DL (ref ?–130)
OSMOLALITY SERPL CALC.SUM OF ELEC: 290 MOSM/KG (ref 275–295)
PATIENT FASTING Y/N/NP: YES
PATIENT FASTING Y/N/NP: YES
PLATELET # BLD AUTO: 239 10(3)UL (ref 150–450)
POTASSIUM SERPL-SCNC: 3.9 MMOL/L (ref 3.5–5.1)
RBC # BLD AUTO: 3.89 X10(6)UL
SODIUM SERPL-SCNC: 140 MMOL/L (ref 136–145)
TRIGL SERPL-MCNC: 125 MG/DL (ref 30–149)
TSI SER-ACNC: 1.2 MIU/ML (ref 0.36–3.74)
VLDLC SERPL CALC-MCNC: 19 MG/DL (ref 0–30)
WBC # BLD AUTO: 10.9 X10(3) UL (ref 4–11)

## 2021-08-10 PROCEDURE — 80061 LIPID PANEL: CPT

## 2021-08-10 PROCEDURE — 80048 BASIC METABOLIC PNL TOTAL CA: CPT

## 2021-08-10 PROCEDURE — 85025 COMPLETE CBC W/AUTO DIFF WBC: CPT

## 2021-08-10 PROCEDURE — 84443 ASSAY THYROID STIM HORMONE: CPT

## 2021-08-10 PROCEDURE — 77067 SCR MAMMO BI INCL CAD: CPT | Performed by: SPECIALIST

## 2021-08-10 PROCEDURE — 77063 BREAST TOMOSYNTHESIS BI: CPT | Performed by: SPECIALIST

## 2021-08-10 PROCEDURE — 36415 COLL VENOUS BLD VENIPUNCTURE: CPT

## 2021-08-12 DIAGNOSIS — J18.9 PNEUMONIA DUE TO INFECTIOUS ORGANISM, UNSPECIFIED LATERALITY, UNSPECIFIED PART OF LUNG: Primary | ICD-10-CM

## 2021-08-13 ENCOUNTER — HOSPITAL ENCOUNTER (OUTPATIENT)
Dept: GENERAL RADIOLOGY | Facility: HOSPITAL | Age: 76
Discharge: HOME | End: 2021-08-13
Attending: STUDENT IN AN ORGANIZED HEALTH CARE EDUCATION/TRAINING PROGRAM
Payer: MEDICARE

## 2021-08-13 DIAGNOSIS — J18.9 PNEUMONIA DUE TO INFECTIOUS ORGANISM, UNSPECIFIED LATERALITY, UNSPECIFIED PART OF LUNG: ICD-10-CM

## 2021-08-13 PROCEDURE — 71046 X-RAY EXAM CHEST 2 VIEWS: CPT | Performed by: STUDENT IN AN ORGANIZED HEALTH CARE EDUCATION/TRAINING PROGRAM

## 2021-08-19 NOTE — ED NOTES
Attempting report rn off floor will call back in 10 min
Pt still wheezing, appears less sob, comfortable, vss, no respiratory distress noted
Report given to aj vasques, no changes, no distress noted
stretcher

## 2021-10-12 ENCOUNTER — HOSPITAL ENCOUNTER (OUTPATIENT)
Age: 76
Discharge: HOME | End: 2021-10-12
Payer: MEDICARE

## 2021-10-12 VITALS
HEART RATE: 86 BPM | RESPIRATION RATE: 18 BRPM | DIASTOLIC BLOOD PRESSURE: 63 MMHG | TEMPERATURE: 98 F | OXYGEN SATURATION: 99 % | SYSTOLIC BLOOD PRESSURE: 138 MMHG

## 2021-10-12 DIAGNOSIS — H61.21 IMPACTED CERUMEN OF RIGHT EAR: Primary | ICD-10-CM

## 2021-10-12 PROCEDURE — 99212 OFFICE O/P EST SF 10 MIN: CPT

## 2021-10-12 PROCEDURE — 69209 REMOVE IMPACTED EAR WAX UNI: CPT

## 2021-10-12 NOTE — ED PROVIDER NOTES
Patient Seen in: [facility]      History   Patient presents with:  Ear Pain    Stated Complaint: right ear pain    Subjective:   HPI  Patient is 52-year-old female past medical history of COPD, pneumonia, sarcoidosis, hypertension, CHF, h Laterality Date   • APPENDECTOMY     • HYSTERECTOMY     • MARIA LUZ BIOPSY STEREO NODULE 2 SITE BILAT (CPT=19081/57669)      2015   • TONSILLECTOMY     • TOTAL ABDOM HYSTERECTOMY                  Social History    Tobacco Use      Smoking status: Never Smoker tenderness of face  No obvious dental infections/ abscesses  Eyes:      General:         Right eye: No discharge. Left eye: No discharge.       Conjunctiva/sclera: Conjunctivae normal.   Cardiovascular:      Rate and Rhythm: Normal rate and regular diagnosis)     Disposition:  Discharge  10/12/2021 12:05 pm    Follow-up:   Stephane Ash  [phone]      if symptoms persist or worsen    Kasandra German02 [facility] [address] [phone]

## 2021-11-29 NOTE — PHYSICAL THERAPY NOTE
PHYSICAL THERAPY QUICK EVALUATION - INPATIENT    Room Number: 416/416-A  Evaluation Date: 5/3/2018  Presenting Problem: pneumonia  Physician Order: PT Eval and Treat    Problem List  Principal Problem:    Community acquired pneumonia of right upper lobe sheets and blankets)?: None   -   Sitting down on and standing up from a chair with arms (e.g., wheelchair, bedside commode, etc.): None   -   Moving from lying on back to sitting on the side of the bed?: None   How much help from another person does the p moderate. PT Discharge Recommendations: Home    PLAN  Patient has been evaluated and presents with no skilled Physical Therapy needs at this time. Patient discharged from Physical Therapy services.   Please re-order if a new functional limitation presents Sofia Stephenson, PGY-1, EM:   paged ortho Sofia Stephenson, PGY-1, EM:  paged hospitalist.

## 2022-08-03 DIAGNOSIS — Z12.31 ENCOUNTER FOR MAMMOGRAM TO ESTABLISH BASELINE MAMMOGRAM: Primary | ICD-10-CM

## 2022-11-11 PROBLEM — J18.9 PNEUMONIA DUE TO INFECTIOUS ORGANISM, UNSPECIFIED LATERALITY, UNSPECIFIED PART OF LUNG: Status: ACTIVE | Noted: 2022-11-11

## 2022-11-11 PROBLEM — J44.9 CHRONIC OBSTRUCTIVE PULMONARY DISEASE (HCC): Status: ACTIVE | Noted: 2019-02-03

## 2022-11-11 PROBLEM — I10 PRIMARY HYPERTENSION: Status: ACTIVE | Noted: 2018-05-03

## 2022-11-11 NOTE — ED INITIAL ASSESSMENT (HPI)
PT states that she has been experiencing difficulty breathing, cough, pain on her lung when she is taking a deep breath for the last week. PT states that she was directed to come to the E.D. by her PCP for further evaluation due to her history of pneumonia.  PT states that she has been using her oxygen at home \"often\"

## 2022-11-11 NOTE — PROGRESS NOTES
NURSING ADMISSION NOTE      Patient admitted via Cart  Oriented to room. Safety precautions initiated. Bed in low position. Call light in reach. Assumed care at 1300, pt alert and oriented x4. RA. IV solumedrol. Tele, SR. C/o pain, see MAR. Cardiac diet. QID accucheck. Saline-locked. Pt resting in bed. Pt updated on poc. Admission navigator completed. All needs are met at this time.

## 2022-11-11 NOTE — ED QUICK NOTES
Orders for admission, patient is aware of plan and ready to go upstairs.  Any questions, please call ED RN Ej Leon at extension [phone]      Patient Covid vaccination status: Fully vaccinated     COVID Test Ordered in ED: SARS-CoV-2/Flu A and B/RSV by PCR (GeneXpert)    COVID Suspicion at Admission: N/A    Running Infusions:      Mental Status/LOC at time of transport: Alert      Other pertinent information:     CIWA score: N/A   NIH score:  N/A

## 2022-11-12 NOTE — PLAN OF CARE
See flowsheets. Axo x 4, glasses, partial upper and lower dentures. 1.5L-2L/BL at home. On 2L NC.  sating well > 98%. No c/o of sob. Afebrile. Tele-SR/ST. BM 11/10. Purewick. Lovenox. Refused SCDs. x1 with walker. Cardiac diet. IV RFA-SL. Approx. 2000 pt c/o of nausea and x1 emesis. Unable to tolerate dinner. Spit up pain medication. Notified MD. Savanna Deng order for PRN IV pain meds. Pt stated she felt clammy. Afebrile VSS. Upon assessment. Pt receive IV Levaquin. Pt stated she had a reaction to IV Levaquin 2 years ago. She stated she broke out in hives and was sweating all over, then received benadryl. Hospitalist notified, pulm notified. IV Levaquin changed to rocephin. See orders. Order for benadryl. Pt continues to have nausea and generalized pain with little relief. See MAR. C/o of chest pain, EKG done-NSR. MD aware. C/o of severe abdominal pain with little relief from IV morphine PRN. MD notified. See new orders. STAT obstructive series complete. Pt NPO. Update patient on plan of care. Frequent roundings, needs met. Call light within reach [facility]. Bed alarm on. VSS.        Problem: Patient/Family Goals  Goal: Patient/Family Long Term Goal  Description: Patient's Long Term Goal: Discharge home with proper resources    Interventions:  - Follow plan of care  - See additional Care Plan goals for specific interventions  Outcome: Progressing  Goal: Patient/Family Short Term Goal  Description: Patient's Short Term Goal:   11/11(NOC): Able to sleep well, relieve pain, relieve nausea    Interventions:   - Antiemetics  -PRN IV pain meds  - See additional Care Plan goals for specific interventions  Outcome: Progressing

## 2022-11-12 NOTE — PHYSICAL THERAPY NOTE
Orders received, chart reviewed. Attempted to see patient for PT Evaluation, patient refused stating \"I cannot do it right now, I have a bad headache\". Will continue to follow-up, RN aware.

## 2022-11-12 NOTE — PLAN OF CARE
Patient is A&Ox4, states \"not feeling good, very queasy. \" BP elevated, SBP in 160s. Rest of vital signs wnl, afebrile. On 1.5L baseline O2, with sats >92%. Having productive cough per patient. NSR on tele. Endorses severe nausea, constant abdominal pain does not radiate anywhere. PRN meds provided- See MAR. Rested in bed entire shift. Refused PT today. Voids. Fall risk. Safety precautions in place. Call light within reach. SCDs refused. Receiving IV steroids and IV abx. On lovenox for VTE prophylaxis. Patient and pt's family at bedside updated on plan of care, both verbalized understanding. Questions addressed. Rounded on frequently. Will continue to monitor.         Problem: Patient/Family Goals  Goal: Patient/Family Long Term Goal  Description: Patient's Long Term Goal: Discharge home with proper resources    Interventions:  - Follow plan of care  - See additional Care Plan goals for specific interventions  Outcome: Progressing  Goal: Patient/Family Short Term Goal  Description: Patient's Short Term Goal:   11/11(NOC): Able to sleep well, relieve pain, relieve nausea  11/12 am: manage nausea and abd pain    Interventions:   - Antiemetics  -PRN IV pain meds  - See additional Care Plan goals for specific interventions  Outcome: Progressing     Problem: Diabetes/Glucose Control  Goal: Glucose maintained within prescribed range  Description: INTERVENTIONS:  - Monitor Blood Glucose as ordered  - Assess for signs and symptoms of hyperglycemia and hypoglycemia  - Administer ordered medications to maintain glucose within target range  - Assess barriers to adequate nutritional intake and initiate nutrition consult as needed  - Instruct patient on self management of diabetes  Outcome: Progressing

## 2022-11-13 NOTE — PLAN OF CARE
Problem: Patient/Family Goals  Goal: Patient/Family Long Term Goal  Description: Patient's Long Term Goal: Discharge home with proper resources    Interventions:  - Follow plan of care  - See additional Care Plan goals for specific interventions  Outcome: Progressing  Goal: Patient/Family Short Term Goal  Description: Patient's Short Term Goal:   11/11(NOC): Able to sleep well, relieve pain, relieve nausea  11/12 am: manage nausea and abd pain  11/12NOC; control nausea and get sleep    Interventions:   - Antiemetics  -PRN IV pain meds  - See additional Care Plan goals for specific interventions  Outcome: Progressing     Problem: Diabetes/Glucose Control  Goal: Glucose maintained within prescribed range  Description: INTERVENTIONS:  - Monitor Blood Glucose as ordered  - Assess for signs and symptoms of hyperglycemia and hypoglycemia  - Administer ordered medications to maintain glucose within target range  - Assess barriers to adequate nutritional intake and initiate nutrition consult as needed  - Instruct patient on self management of diabetes  Outcome: Progressing

## 2022-11-13 NOTE — PROGRESS NOTES
Problem: PNA and COPD exacerbation     Data: Pt is A&Ox4. , on 1L NC with O2 sats at 97%. 1L is patients baseline. On Tele NSR/ST. E.P cardiac. Pt get's lovenox. Pt c/o of nausea that get's worse while taking medication or eating or drinking. Per pt this started after Levaquin infusion. Palpated abdomen but pt denies any tenderness. Abdomen is soft. PRN antiemetic offered. Clear liquid diet ADAT. Pt c/o of back pain and chronic arthritic pain in her legs and back. I asked pt if pain was worse or different. Per pt pain is the same as it is usually at home, per pt she states she usually sits and cries and nothing helps. Gave PRN Pain medication see MAR. Purewick and brief in place. PT/OT on. QID accu checks. Intervention: IV abx, IV steroids, PRN pain medicine, PRN antiemetic. Edu: Pt updated on POC. All questions were answered. Call light within reach. Bed alarms on. Safety precautions in place. DISCHARGE

## 2022-11-13 NOTE — PROGRESS NOTES
11/13/22 0012   Provider Notification   Reason for Communication Other (comment)   Provider Name Other (comment)  (Dr. Giovanni Mccrary)   Method of Communication Page   Response Waiting for response     pt in 501 Blood pressure was 169/73 rechecked BP and got 170/80. Pt here PNA and COPD exacerbation. Any new orders? thanks.        0018- see new orders

## 2022-11-13 NOTE — PROGRESS NOTES
11/13/22 0222   Provider Notification   Reason for Communication Other (comment)  (Sepsis BPA)   Provider Name Other (comment)  (Dr. Sultana Carter)   Method of Communication Page   Response Waiting for response       Dr. Sultana Carter aware of Sepsis BPA, Updated that pt is still having nausea but no worsening symptoms. no new orders at the moment.

## 2022-11-14 NOTE — PLAN OF CARE
Pt AOX4. Glasses. Dentures. VSS on RA. 1.5-2 L with activity which is baseline. Solumedrol, po prednisone starting tomorrow. Tele, NSR. Prn hydralazine for SBP>160. Lovenox. Refusing scds. E.p. bladder scan Q6. C/o generalized pain and epigastric pain/nausea. Prn medication given. SBA w walker. PT recommending home. Iv abx. Saline locked. Full liquid diet. QID accu checks. Poor appetite. Pending CT abdomen and pelvis. Pt updated on poc. No further needs.       Problem: Diabetes/Glucose Control  Goal: Glucose maintained within prescribed range  Description: INTERVENTIONS:  - Monitor Blood Glucose as ordered  - Assess for signs and symptoms of hyperglycemia and hypoglycemia  - Administer ordered medications to maintain glucose within target range  - Assess barriers to adequate nutritional intake and initiate nutrition consult as needed  - Instruct patient on self management of diabetes  Outcome: Progressing

## 2022-11-14 NOTE — OCCUPATIONAL THERAPY NOTE
OT received evaluation order and checked in with nursing. Patient politely declined OT services due to fatigue and requesting to rest. OT to continue to follow and see patient as scheduling permits.

## 2022-11-14 NOTE — PLAN OF CARE
Problem: Patient/Family Goals  Goal: Patient/Family Long Term Goal  Description: Patient's Long Term Goal: Discharge home with proper resources    Interventions:  - Follow plan of care  - See additional Care Plan goals for specific interventions  Outcome: Progressing  Goal: Patient/Family Short Term Goal  Description: Patient's Short Term Goal:   11/11(NOC): Able to sleep well, relieve pain, relieve nausea  11/12 am: manage nausea and abd pain  11/12NOC; control nausea and get sleep  11/13 am: manage nausea and abd pain    Interventions:   - Antiemetics  -PRN IV pain meds  - See additional Care Plan goals for specific interventions  Outcome: Progressing   Dx: PNA/COPD  Plan of care: IV antbx/steroids, prn pain meds  Patient communicates understanding, complaining of 8/10 pain requesting morphine and norco. Bladder scan revealed 335 ml, continue to monitor.

## 2022-11-15 PROBLEM — R11.2 INTRACTABLE NAUSEA AND VOMITING: Status: ACTIVE | Noted: 2021-01-09

## 2022-11-15 NOTE — PLAN OF CARE
Pt A&Ox4. Glasses and dentures at bedside. Room air, . 1-2 liters with activity, which is pt's baseline. Start po steroids in am. Tele, NSR. Afebrile. Elevated BP overnight, prn hydralazine given. Lovenox. IV abx. Retaining urine. Able to void on own overnight. Bladder scans as needed. C/o of generalized/lower abdominal pain, pain management per MAR. No c/o of sob, n/v/d. Up SBA with walker to BR. Full liquid diet, QID accuchecks. Poor appetite noted. CT abd/pevis w/ contrat done overnight. Pt updated on poc. No further needs at this time. Safety/fall precautions in place. WCTM.        Problem: Patient/Family Goals  Goal: Patient/Family Long Term Goal  Description: Patient's Long Term Goal: Discharge home with proper resources    Interventions:  - Follow plan of care  - See additional Care Plan goals for specific interventions  Outcome: Progressing  Goal: Patient/Family Short Term Goal  Description: Patient's Short Term Goal:   11/11(NOC): Able to sleep well, relieve pain, relieve nausea  11/12 am: manage nausea and abd pain  11/12NOC; control nausea and get sleep  11/13 am: manage nausea and abd pain  11/14 AM: manage pain  11/14 NOC: manage pain, sleep well     Interventions:   - Antiemetics  -PRN IV pain meds  - See additional Care Plan goals for specific interventions  Outcome: Progressing     Problem: Diabetes/Glucose Control  Goal: Glucose maintained within prescribed range  Description: INTERVENTIONS:  - Monitor Blood Glucose as ordered  - Assess for signs and symptoms of hyperglycemia and hypoglycemia  - Administer ordered medications to maintain glucose within target range  - Assess barriers to adequate nutritional intake and initiate nutrition consult as needed  - Instruct patient on self management of diabetes  Outcome: Progressing     Problem: PAIN - ADULT  Goal: Verbalizes/displays adequate comfort level or patient's stated pain goal  Description: INTERVENTIONS:  - Encourage pt to monitor pain and request assistance  - Assess pain using appropriate pain scale  - Administer analgesics based on type and severity of pain and evaluate response  - Implement non-pharmacological measures as appropriate and evaluate response  - Consider cultural and social influences on pain and pain management  - Manage/alleviate anxiety  - Utilize distraction and/or relaxation techniques  - Monitor for opioid side effects  - Notify MD/LIP if interventions unsuccessful or patient reports new pain  - Anticipate increased pain with activity and pre-medicate as appropriate  Outcome: Progressing

## 2022-11-15 NOTE — CM/SW NOTE
Gave pt [facility] agency choice list - she will discuss choices with her dtr Vivienne Mirza and let us know who she wants for [facility].

## 2022-11-15 NOTE — PLAN OF CARE
Problem: Patient/Family Goals  Goal: Patient/Family Long Term Goal  Description: Patient's Long Term Goal: Discharge home with proper resources    Interventions:  - Follow plan of care  - See additional Care Plan goals for specific interventions  Outcome: Progressing  Goal: Patient/Family Short Term Goal  Description: Patient's Short Term Goal:   11/11(NOC): Able to sleep well, relieve pain, relieve nausea  11/12 am: manage nausea and abd pain  11/12NOC; control nausea and get sleep  11/13 am: manage nausea and abd pain  11/14 AM: manage pain  11/14 NOC: manage pain, sleep well     Interventions:   - Antiemetics  -PRN IV pain meds  - See additional Care Plan goals for specific interventions  Outcome: Progressing     Problem: Diabetes/Glucose Control  Goal: Glucose maintained within prescribed range  Description: INTERVENTIONS:  - Monitor Blood Glucose as ordered  - Assess for signs and symptoms of hyperglycemia and hypoglycemia  - Administer ordered medications to maintain glucose within target range  - Assess barriers to adequate nutritional intake and initiate nutrition consult as needed  - Instruct patient on self management of diabetes  Outcome: Progressing     Problem: PAIN - ADULT  Goal: Verbalizes/displays adequate comfort level or patient's stated pain goal  Description: INTERVENTIONS:  - Encourage pt to monitor pain and request assistance  - Assess pain using appropriate pain scale  - Administer analgesics based on type and severity of pain and evaluate response  - Implement non-pharmacological measures as appropriate and evaluate response  - Consider cultural and social influences on pain and pain management  - Manage/alleviate anxiety  - Utilize distraction and/or relaxation techniques  - Monitor for opioid side effects  - Notify MD/LIP if interventions unsuccessful or patient reports new pain  - Anticipate increased pain with activity and pre-medicate as appropriate  Outcome: Progressing     A&Ox4. VSS. 2L O2 . Telemetry: NSR  GI: Abdomen soft, nondistended. Denies nausea. : Voids. Pain controlled with PRN pain medications  Up with standby assist with walker. Diet:full liquids  IV saline locked, receiving IV rocephin  All appropriate safety measures in place. All questions and concerns addressed.  Will continue to monitor

## 2022-11-16 NOTE — PLAN OF CARE
Pt is admitted for PNA . Pt is AOx4. VSS, afebrile and c/o abd pain after ate. Pt has chronic bilateral knee pain . PRN Norco. SpO2 maintained on RA. . Denies any SOB and cough. Tele-NSR. Lovenox. Pt id not tolerate reg diet. Pt has nausea and abd cramp. Full liquid diet. Pt had three loose movements. Voids. Up with SBA/walker. PO steroid. WCTM. Pt is updated with plan of care.         Problem: Patient/Family Goals  Goal: Patient/Family Long Term Goal  Description: Patient's Long Term Goal: Discharge home with proper resources    Interventions:  - Follow plan of care  - See additional Care Plan goals for specific interventions  Outcome: Progressing  Goal: Patient/Family Short Term Goal  Description: Patient's Short Term Goal:   11/11(NOC): Able to sleep well, relieve pain, relieve nausea  11/12 am: manage nausea and abd pain  11/12NOC; control nausea and get sleep  11/13 am: manage nausea and abd pain  11/14 AM: manage pain  11/14 NOC: manage pain, sleep well   11/15 NOC: manage pain, no n/v  11/16: go home     Interventions:   - Antiemetics  -PRN IV pain meds  - See additional Care Plan goals for specific interventions  Outcome: Progressing     Problem: Diabetes/Glucose Control  Goal: Glucose maintained within prescribed range  Description: INTERVENTIONS:  - Monitor Blood Glucose as ordered  - Assess for signs and symptoms of hyperglycemia and hypoglycemia  - Administer ordered medications to maintain glucose within target range  - Assess barriers to adequate nutritional intake and initiate nutrition consult as needed  - Instruct patient on self management of diabetes  Outcome: Progressing     Problem: PAIN - ADULT  Goal: Verbalizes/displays adequate comfort level or patient's stated pain goal  Description: INTERVENTIONS:  - Encourage pt to monitor pain and request assistance  - Assess pain using appropriate pain scale  - Administer analgesics based on type and severity of pain and evaluate response  - Implement non-pharmacological measures as appropriate and evaluate response  - Consider cultural and social influences on pain and pain management  - Manage/alleviate anxiety  - Utilize distraction and/or relaxation techniques  - Monitor for opioid side effects  - Notify MD/LIP if interventions unsuccessful or patient reports new pain  - Anticipate increased pain with activity and pre-medicate as appropriate  Outcome: Progressing

## 2022-11-16 NOTE — CM/SW NOTE
SW met with pt at bedside regarding [facility] choice. Pt stated she reviewed the list but does not want to make a [facility] selection until she speaks with her dtr this afternoon. SW will f/u with pt regarding [facility] choice.      NOY Kenney  Discharge Planner

## 2022-11-16 NOTE — PLAN OF CARE
Pt A&Ox4. Glasses and dentures at bedside. Room air, . 1-2 liters with activity, which is pt's baseline. PO steroids. Tele, NSR. Afebrile. Lovenox. IV abx. Voids, up SBA with walker. Pt c/o of constipation, nightly prn senokot given. C/o of generalized/lower abdominal pain, pain management per MAR. No c/o of sob, v/d. C/o of nausea, did not want antiemetic. Full liquid diet, QID accuchecks. Poor appetite noted. Pt updated on poc. No further needs at this time. Safety/fall precautions in place. WCTM.        Problem: Patient/Family Goals  Goal: Patient/Family Long Term Goal  Description: Patient's Long Term Goal: Discharge home with proper resources    Interventions:  - Follow plan of care  - See additional Care Plan goals for specific interventions  Outcome: Progressing  Goal: Patient/Family Short Term Goal  Description: Patient's Short Term Goal:   11/11(NOC): Able to sleep well, relieve pain, relieve nausea  11/12 am: manage nausea and abd pain  11/12NOC; control nausea and get sleep  11/13 am: manage nausea and abd pain  11/14 AM: manage pain  11/14 NOC: manage pain, sleep well   11/15 NOC: manage pain, no n/v    Interventions:   - Antiemetics  -PRN IV pain meds  - See additional Care Plan goals for specific interventions  Outcome: Progressing     Problem: Diabetes/Glucose Control  Goal: Glucose maintained within prescribed range  Description: INTERVENTIONS:  - Monitor Blood Glucose as ordered  - Assess for signs and symptoms of hyperglycemia and hypoglycemia  - Administer ordered medications to maintain glucose within target range  - Assess barriers to adequate nutritional intake and initiate nutrition consult as needed  - Instruct patient on self management of diabetes  Outcome: Progressing     Problem: PAIN - ADULT  Goal: Verbalizes/displays adequate comfort level or patient's stated pain goal  Description: INTERVENTIONS:  - Encourage pt to monitor pain and request assistance  - Assess pain using appropriate pain scale  - Administer analgesics based on type and severity of pain and evaluate response  - Implement non-pharmacological measures as appropriate and evaluate response  - Consider cultural and social influences on pain and pain management  - Manage/alleviate anxiety  - Utilize distraction and/or relaxation techniques  - Monitor for opioid side effects  - Notify MD/LIP if interventions unsuccessful or patient reports new pain  - Anticipate increased pain with activity and pre-medicate as appropriate  Outcome: Progressing

## 2022-11-16 NOTE — PHYSICAL THERAPY NOTE
Attempted to see Pt this PM - RN aware of attempt. Pt requesting to rest after lunch. Will f/u later today if time permits, after all other patients are attempted per tentative schedule.

## 2022-11-17 NOTE — CM/SW NOTE
11/17/22 1237   Choice of Post-Acute Provider   Informed patient of right to choose their preferred provider Yes   List of appropriate post-acute services provided to patient/family with quality data Yes   Patient/family choice [facility]   Information given to Patient     SW met with pt at bedside regarding [facility] choice. Pt selected [facility], reserved in [address]. SW notified [facility] via [address] of pt's anticipated discharge today. SW will continue to follow for any additional discharge needs.      NOY Kenney  Discharge Planner

## 2022-11-17 NOTE — PROGRESS NOTES
NURSING DISCHARGE NOTE    Discharged Home via Wheelchair. Accompanied by Spouse  Belongings Taken by patient/family. IV taken out. Discharge education given. Medications given per MAR. Pt going home via private car.

## 2022-11-17 NOTE — DISCHARGE INSTRUCTIONS
Sometimes managing your health at home requires assistance. The [facility] team has recognized your preference to use [facility]. They can be reached at [phone]. The fax number for your reference is [phone]  A representative from the home health agency will contact you or your family to schedule your first visit.

## 2022-11-17 NOTE — PLAN OF CARE
Problem: Patient/Family Goals  Goal: Patient/Family Long Term Goal  Description: Patient's Long Term Goal: Discharge home with proper resources    Interventions:  - Follow plan of care  - See additional Care Plan goals for specific interventions  Outcome: Progressing  Goal: Patient/Family Short Term Goal  Description: Patient's Short Term Goal:   11/11(NOC): Able to sleep well, relieve pain, relieve nausea  11/12 am: manage nausea and abd pain  11/12NOC; control nausea and get sleep  11/13 am: manage nausea and abd pain  11/14 AM: manage pain  11/14 NOC: manage pain, sleep well   11/15 NOC: manage pain, no n/v  11/16: go home   11/16 NOC: manage pain, go home tomorrow   11/17: tolerate diet , and control pain     Interventions:   - Antiemetics  -PRN IV pain meds  - See additional Care Plan goals for specific interventions  Outcome: Progressing     Problem: Diabetes/Glucose Control  Goal: Glucose maintained within prescribed range  Description: INTERVENTIONS:  - Monitor Blood Glucose as ordered  - Assess for signs and symptoms of hyperglycemia and hypoglycemia  - Administer ordered medications to maintain glucose within target range  - Assess barriers to adequate nutritional intake and initiate nutrition consult as needed  - Instruct patient on self management of diabetes  Outcome: Progressing     Problem: PAIN - ADULT  Goal: Verbalizes/displays adequate comfort level or patient's stated pain goal  Description: INTERVENTIONS:  - Encourage pt to monitor pain and request assistance  - Assess pain using appropriate pain scale  - Administer analgesics based on type and severity of pain and evaluate response  - Implement non-pharmacological measures as appropriate and evaluate response  - Consider cultural and social influences on pain and pain management  - Manage/alleviate anxiety  - Utilize distraction and/or relaxation techniques  - Monitor for opioid side effects  - Notify MD/LIP if interventions unsuccessful or patient reports new pain  - Anticipate increased pain with activity and pre-medicate as appropriate  Outcome: Progressing

## 2022-12-08 NOTE — PATIENT INSTRUCTIONS
Please obtain a CT chest scan in February/ March 2023  Continue your inhalers - breztri twice a day and albuterol 2 puffs every 6 hours as needed. Use your nebulizers - albuterol - every6 hours as needed.  If you have phlegm, we should resume the salt (saline) nebulizers to help cough out the phlegm  See me in March 2023 after the CT scan or earlier if needed

## 2023-02-21 ENCOUNTER — HOSPITAL ENCOUNTER (OUTPATIENT)
Dept: CT IMAGING | Facility: HOSPITAL | Age: 78
Discharge: HOME | End: 2023-02-21
Attending: INTERNAL MEDICINE
Payer: MEDICARE

## 2023-02-21 DIAGNOSIS — J98.09 BRONCHIAL STENOSIS: ICD-10-CM

## 2023-02-21 DIAGNOSIS — J18.9 PNEUMONIA OF RIGHT LUNG DUE TO INFECTIOUS ORGANISM, UNSPECIFIED PART OF LUNG: ICD-10-CM

## 2023-02-21 PROCEDURE — 71250 CT THORAX DX C-: CPT | Performed by: INTERNAL MEDICINE

## 2023-03-08 ENCOUNTER — OFFICE VISIT (OUTPATIENT)
Facility: CLINIC | Age: 78
End: 2023-03-08
Payer: COMMERCIAL

## 2023-03-08 VITALS
HEIGHT: 64 IN | WEIGHT: 193 LBS | RESPIRATION RATE: 16 BRPM | SYSTOLIC BLOOD PRESSURE: 130 MMHG | OXYGEN SATURATION: 98 % | HEART RATE: 82 BPM | DIASTOLIC BLOOD PRESSURE: 60 MMHG | BODY MASS INDEX: 32.95 KG/M2

## 2023-03-08 DIAGNOSIS — J44.9 CHRONIC OBSTRUCTIVE PULMONARY DISEASE, UNSPECIFIED COPD TYPE (HCC): ICD-10-CM

## 2023-03-08 DIAGNOSIS — J98.09 BRONCHIAL STENOSIS: ICD-10-CM

## 2023-03-08 DIAGNOSIS — J18.9 PNEUMONIA OF RIGHT LUNG DUE TO INFECTIOUS ORGANISM, UNSPECIFIED PART OF LUNG: ICD-10-CM

## 2023-03-08 DIAGNOSIS — R06.00 DYSPNEA, UNSPECIFIED TYPE: Primary | ICD-10-CM

## 2023-03-08 DIAGNOSIS — J96.11 CHRONIC RESPIRATORY FAILURE WITH HYPOXIA (HCC): ICD-10-CM

## 2023-03-08 PROCEDURE — 3008F BODY MASS INDEX DOCD: CPT | Performed by: INTERNAL MEDICINE

## 2023-03-08 PROCEDURE — 3075F SYST BP GE 130 - 139MM HG: CPT | Performed by: INTERNAL MEDICINE

## 2023-03-08 PROCEDURE — 3078F DIAST BP <80 MM HG: CPT | Performed by: INTERNAL MEDICINE

## 2023-03-08 PROCEDURE — 99214 OFFICE O/P EST MOD 30 MIN: CPT | Performed by: INTERNAL MEDICINE

## 2023-03-08 RX ORDER — BUDESONIDE, GLYCOPYRROLATE, AND FORMOTEROL FUMARATE 160; 9; 4.8 UG/1; UG/1; UG/1
AEROSOL, METERED RESPIRATORY (INHALATION)
COMMUNITY

## 2023-03-08 RX ORDER — PANTOPRAZOLE SODIUM 40 MG/1
40 TABLET, DELAYED RELEASE ORAL DAILY
COMMUNITY
Start: 2023-01-31

## 2023-03-08 RX ORDER — HYDROCHLOROTHIAZIDE 12.5 MG/1
12.5 TABLET ORAL DAILY
COMMUNITY
Start: 2023-02-14

## 2023-03-08 RX ORDER — CEFDINIR 300 MG/1
1 CAPSULE ORAL 2 TIMES DAILY
COMMUNITY
Start: 2023-02-25

## 2023-03-08 RX ORDER — BENZONATATE 200 MG/1
CAPSULE ORAL
COMMUNITY

## 2023-03-08 NOTE — PATIENT INSTRUCTIONS
Finish the antibiotics. Just use breztri inhaler 2 puffs twice a day. Rinse your mouth out after using. Use your nebulizers - albuterol - every6 hours as needed.  If you have phlegm, we should resume the salt (saline) nebulizers to help cough out the phlegm   Please obtain a breathing test in the next few weeks  Consider pulmonary rehab  Follow up in 2-3months with me or [facility] (nurse practitioner)

## 2023-04-02 ENCOUNTER — LAB ENCOUNTER (OUTPATIENT)
Dept: LAB | Facility: HOSPITAL | Age: 78
End: 2023-04-02
Attending: INTERNAL MEDICINE
Payer: MEDICARE

## 2023-04-02 DIAGNOSIS — R06.00 DYSPNEA, UNSPECIFIED TYPE: ICD-10-CM

## 2023-04-03 LAB — SARS-COV-2 RNA RESP QL NAA+PROBE: NOT DETECTED

## 2023-04-05 ENCOUNTER — RT VISIT (OUTPATIENT)
Dept: RESPIRATORY THERAPY | Facility: HOSPITAL | Age: 78
End: 2023-04-05
Attending: INTERNAL MEDICINE
Payer: MEDICARE

## 2023-04-05 DIAGNOSIS — R06.00 DYSPNEA, UNSPECIFIED TYPE: ICD-10-CM

## 2023-04-06 PROCEDURE — 94010 BREATHING CAPACITY TEST: CPT | Performed by: INTERNAL MEDICINE

## 2023-04-06 PROCEDURE — 94729 DIFFUSING CAPACITY: CPT | Performed by: INTERNAL MEDICINE

## 2023-04-06 NOTE — PROCEDURES
Findings:  FEV1 is 0.96L, 55% predicted. FVC is 1.27L, 57% predicted. FEV1/ FVC ratio is 0.75. The flow-volume loop demonstrates a restrictive pattern. Lung volumes were not able to be obtained - per technician notes, the patient was unable to obtain due to claustrophobia. The diffusion capacity is 29% predicted and 55% predicted when corrected for alveolar volume. Impression:  There is no airway obstruction on spirometry and visualized on flow-volume loop. The reduced spirometric volumes and flow-volume loop suggest restriction, however the patient was not able to perform bodyplethysmographhy due to claustrophobia. Diffusion capacity is severely reduced with DLCO of 29% but improves to moderate when considering alveolar volume. This may relate to underlying restrictive lung disease, interstitial lung disease or another process, such as pulmonary vascular disease and anemia. If not already performed, would suggest further evaluation as determined clinically. Additionally, given the severity of the reduced diffusion capacity, would recommend evaluation for hypoxia and supplemental oxygenation if not already performed. There are no previous pulmonary function tests available for comparison.

## 2023-04-10 ENCOUNTER — APPOINTMENT (OUTPATIENT)
Dept: CT IMAGING | Facility: HOSPITAL | Age: 78
End: 2023-04-10
Attending: EMERGENCY MEDICINE
Payer: MEDICARE

## 2023-04-10 ENCOUNTER — APPOINTMENT (OUTPATIENT)
Dept: CV DIAGNOSTICS | Facility: HOSPITAL | Age: 78
End: 2023-04-10
Attending: INTERNAL MEDICINE
Payer: MEDICARE

## 2023-04-10 ENCOUNTER — HOSPITAL ENCOUNTER (OUTPATIENT)
Facility: HOSPITAL | Age: 78
Setting detail: OBSERVATION
Discharge: HOME HEALTH | End: 2023-04-13
Attending: EMERGENCY MEDICINE | Admitting: HOSPITALIST
Payer: MEDICARE

## 2023-04-10 DIAGNOSIS — K52.9 GASTROENTERITIS: Primary | ICD-10-CM

## 2023-04-10 LAB
ALBUMIN SERPL-MCNC: 3.6 G/DL (ref 3.4–5)
ALBUMIN/GLOB SERPL: 0.7 {RATIO} (ref 1–2)
ALP LIVER SERPL-CCNC: 109 U/L
ALT SERPL-CCNC: 17 U/L
ANION GAP SERPL CALC-SCNC: 9 MMOL/L (ref 0–18)
AST SERPL-CCNC: 37 U/L (ref 15–37)
BASOPHILS # BLD AUTO: 0.02 X10(3) UL (ref 0–0.2)
BASOPHILS NFR BLD AUTO: 0.1 %
BILIRUB SERPL-MCNC: 0.7 MG/DL (ref 0.1–2)
BUN BLD-MCNC: 14 MG/DL (ref 7–18)
CALCIUM BLD-MCNC: 9.6 MG/DL (ref 8.5–10.1)
CHLORIDE SERPL-SCNC: 103 MMOL/L (ref 98–112)
CHOLEST SERPL-MCNC: 137 MG/DL (ref ?–200)
CO2 SERPL-SCNC: 27 MMOL/L (ref 21–32)
CREAT BLD-MCNC: 0.79 MG/DL
EOSINOPHIL # BLD AUTO: 0 X10(3) UL (ref 0–0.7)
EOSINOPHIL NFR BLD AUTO: 0 %
ERYTHROCYTE [DISTWIDTH] IN BLOOD BY AUTOMATED COUNT: 13 %
EST. AVERAGE GLUCOSE BLD GHB EST-MCNC: 128 MG/DL (ref 68–126)
GFR SERPLBLD BASED ON 1.73 SQ M-ARVRAT: 77 ML/MIN/1.73M2 (ref 60–?)
GLOBULIN PLAS-MCNC: 4.9 G/DL (ref 2.8–4.4)
GLUCOSE BLD-MCNC: 119 MG/DL (ref 70–99)
GLUCOSE BLD-MCNC: 136 MG/DL (ref 70–99)
GLUCOSE BLD-MCNC: 189 MG/DL (ref 70–99)
HBA1C MFR BLD: 6.1 % (ref ?–5.7)
HCT VFR BLD AUTO: 33.3 %
HDLC SERPL-MCNC: 60 MG/DL (ref 40–59)
HGB BLD-MCNC: 10.5 G/DL
IMM GRANULOCYTES # BLD AUTO: 0.06 X10(3) UL (ref 0–1)
IMM GRANULOCYTES NFR BLD: 0.4 %
LDLC SERPL CALC-MCNC: 62 MG/DL (ref ?–100)
LYMPHOCYTES # BLD AUTO: 1.91 X10(3) UL (ref 1–4)
LYMPHOCYTES NFR BLD AUTO: 14 %
MCH RBC QN AUTO: 27.3 PG (ref 26–34)
MCHC RBC AUTO-ENTMCNC: 31.5 G/DL (ref 31–37)
MCV RBC AUTO: 86.7 FL
MONOCYTES # BLD AUTO: 0.84 X10(3) UL (ref 0.1–1)
MONOCYTES NFR BLD AUTO: 6.2 %
NEUTROPHILS # BLD AUTO: 10.79 X10 (3) UL (ref 1.5–7.7)
NEUTROPHILS # BLD AUTO: 10.79 X10(3) UL (ref 1.5–7.7)
NEUTROPHILS NFR BLD AUTO: 79.3 %
NONHDLC SERPL-MCNC: 77 MG/DL (ref ?–130)
OSMOLALITY SERPL CALC.SUM OF ELEC: 294 MOSM/KG (ref 275–295)
PLATELET # BLD AUTO: 317 10(3)UL (ref 150–450)
POTASSIUM SERPL-SCNC: 3.5 MMOL/L (ref 3.5–5.1)
PROT SERPL-MCNC: 8.5 G/DL (ref 6.4–8.2)
RBC # BLD AUTO: 3.84 X10(6)UL
SARS-COV-2 RNA RESP QL NAA+PROBE: NOT DETECTED
SODIUM SERPL-SCNC: 139 MMOL/L (ref 136–145)
TRIGL SERPL-MCNC: 76 MG/DL (ref 30–149)
TROPONIN I HIGH SENSITIVITY: 108 NG/L
TROPONIN I HIGH SENSITIVITY: 147 NG/L
TROPONIN I HIGH SENSITIVITY: 48 NG/L
TROPONIN I HIGH SENSITIVITY: 66 NG/L
VLDLC SERPL CALC-MCNC: 11 MG/DL (ref 0–30)
WBC # BLD AUTO: 13.6 X10(3) UL (ref 4–11)

## 2023-04-10 PROCEDURE — 99223 1ST HOSP IP/OBS HIGH 75: CPT | Performed by: INTERNAL MEDICINE

## 2023-04-10 PROCEDURE — 74177 CT ABD & PELVIS W/CONTRAST: CPT | Performed by: EMERGENCY MEDICINE

## 2023-04-10 RX ORDER — ALBUTEROL SULFATE 90 UG/1
2 AEROSOL, METERED RESPIRATORY (INHALATION) 2 TIMES DAILY
Status: DISCONTINUED | OUTPATIENT
Start: 2023-04-10 | End: 2023-04-13

## 2023-04-10 RX ORDER — ONDANSETRON 2 MG/ML
4 INJECTION INTRAMUSCULAR; INTRAVENOUS ONCE
Status: COMPLETED | OUTPATIENT
Start: 2023-04-10 | End: 2023-04-10

## 2023-04-10 RX ORDER — KETOROLAC TROMETHAMINE 15 MG/ML
15 INJECTION, SOLUTION INTRAMUSCULAR; INTRAVENOUS ONCE
Status: COMPLETED | OUTPATIENT
Start: 2023-04-10 | End: 2023-04-10

## 2023-04-10 RX ORDER — IPRATROPIUM BROMIDE AND ALBUTEROL SULFATE 2.5; .5 MG/3ML; MG/3ML
3 SOLUTION RESPIRATORY (INHALATION) EVERY 4 HOURS PRN
Status: DISCONTINUED | OUTPATIENT
Start: 2023-04-10 | End: 2023-04-13

## 2023-04-10 RX ORDER — FLUTICASONE FUROATE AND VILANTEROL 200; 25 UG/1; UG/1
1 POWDER RESPIRATORY (INHALATION) DAILY
Status: DISCONTINUED | OUTPATIENT
Start: 2023-04-10 | End: 2023-04-10

## 2023-04-10 RX ORDER — NICOTINE POLACRILEX 4 MG
30 LOZENGE BUCCAL
Status: DISCONTINUED | OUTPATIENT
Start: 2023-04-10 | End: 2023-04-13

## 2023-04-10 RX ORDER — ONDANSETRON 2 MG/ML
INJECTION INTRAMUSCULAR; INTRAVENOUS
Status: COMPLETED
Start: 2023-04-10 | End: 2023-04-10

## 2023-04-10 RX ORDER — HYDROMORPHONE HYDROCHLORIDE 1 MG/ML
0.5 INJECTION, SOLUTION INTRAMUSCULAR; INTRAVENOUS; SUBCUTANEOUS EVERY 30 MIN PRN
Status: DISCONTINUED | OUTPATIENT
Start: 2023-04-10 | End: 2023-04-10

## 2023-04-10 RX ORDER — SENNOSIDES 8.6 MG
17.2 TABLET ORAL NIGHTLY PRN
Status: DISCONTINUED | OUTPATIENT
Start: 2023-04-10 | End: 2023-04-13

## 2023-04-10 RX ORDER — DEXTROSE MONOHYDRATE 25 G/50ML
50 INJECTION, SOLUTION INTRAVENOUS
Status: DISCONTINUED | OUTPATIENT
Start: 2023-04-10 | End: 2023-04-13

## 2023-04-10 RX ORDER — IPRATROPIUM BROMIDE AND ALBUTEROL SULFATE 2.5; .5 MG/3ML; MG/3ML
3 SOLUTION RESPIRATORY (INHALATION)
Status: DISCONTINUED | OUTPATIENT
Start: 2023-04-10 | End: 2023-04-10

## 2023-04-10 RX ORDER — ALBUTEROL SULFATE 90 UG/1
2 AEROSOL, METERED RESPIRATORY (INHALATION) EVERY 4 HOURS PRN
Status: DISCONTINUED | OUTPATIENT
Start: 2023-04-10 | End: 2023-04-10

## 2023-04-10 RX ORDER — NICOTINE POLACRILEX 4 MG
15 LOZENGE BUCCAL
Status: DISCONTINUED | OUTPATIENT
Start: 2023-04-10 | End: 2023-04-13

## 2023-04-10 RX ORDER — SODIUM PHOSPHATE, DIBASIC AND SODIUM PHOSPHATE, MONOBASIC 7; 19 G/133ML; G/133ML
1 ENEMA RECTAL ONCE AS NEEDED
Status: DISCONTINUED | OUTPATIENT
Start: 2023-04-10 | End: 2023-04-13

## 2023-04-10 RX ORDER — MELATONIN
3 NIGHTLY PRN
Status: DISCONTINUED | OUTPATIENT
Start: 2023-04-10 | End: 2023-04-13

## 2023-04-10 RX ORDER — ACETAMINOPHEN 500 MG
500 TABLET ORAL EVERY 4 HOURS PRN
Status: DISCONTINUED | OUTPATIENT
Start: 2023-04-10 | End: 2023-04-13

## 2023-04-10 RX ORDER — HYDROCODONE BITARTRATE AND ACETAMINOPHEN 10; 325 MG/1; MG/1
1 TABLET ORAL EVERY 6 HOURS PRN
Status: DISCONTINUED | OUTPATIENT
Start: 2023-04-10 | End: 2023-04-13

## 2023-04-10 RX ORDER — BISACODYL 10 MG
10 SUPPOSITORY, RECTAL RECTAL
Status: DISCONTINUED | OUTPATIENT
Start: 2023-04-10 | End: 2023-04-13

## 2023-04-10 RX ORDER — POLYETHYLENE GLYCOL 3350 17 G/17G
17 POWDER, FOR SOLUTION ORAL DAILY PRN
Status: DISCONTINUED | OUTPATIENT
Start: 2023-04-10 | End: 2023-04-13

## 2023-04-10 RX ORDER — SODIUM CHLORIDE, SODIUM LACTATE, POTASSIUM CHLORIDE, CALCIUM CHLORIDE 600; 310; 30; 20 MG/100ML; MG/100ML; MG/100ML; MG/100ML
INJECTION, SOLUTION INTRAVENOUS CONTINUOUS
Status: DISCONTINUED | OUTPATIENT
Start: 2023-04-10 | End: 2023-04-11

## 2023-04-10 RX ORDER — HYDROCHLOROTHIAZIDE 12.5 MG/1
12.5 TABLET ORAL DAILY
Status: DISCONTINUED | OUTPATIENT
Start: 2023-04-10 | End: 2023-04-13

## 2023-04-10 RX ORDER — ATORVASTATIN CALCIUM 10 MG/1
10 TABLET, FILM COATED ORAL NIGHTLY
Status: DISCONTINUED | OUTPATIENT
Start: 2023-04-10 | End: 2023-04-13

## 2023-04-10 RX ORDER — ONDANSETRON 2 MG/ML
4 INJECTION INTRAMUSCULAR; INTRAVENOUS EVERY 6 HOURS PRN
Status: DISCONTINUED | OUTPATIENT
Start: 2023-04-10 | End: 2023-04-13

## 2023-04-10 RX ORDER — KETOROLAC TROMETHAMINE 30 MG/ML
15 INJECTION, SOLUTION INTRAMUSCULAR; INTRAVENOUS EVERY 6 HOURS PRN
Status: DISPENSED | OUTPATIENT
Start: 2023-04-10 | End: 2023-04-12

## 2023-04-10 RX ORDER — ENOXAPARIN SODIUM 100 MG/ML
40 INJECTION SUBCUTANEOUS DAILY
Status: DISCONTINUED | OUTPATIENT
Start: 2023-04-10 | End: 2023-04-13

## 2023-04-10 RX ORDER — FLUTICASONE FUROATE AND VILANTEROL 200; 25 UG/1; UG/1
1 POWDER RESPIRATORY (INHALATION) DAILY
Status: DISCONTINUED | OUTPATIENT
Start: 2023-04-10 | End: 2023-04-13

## 2023-04-10 RX ORDER — ASPIRIN 325 MG
325 TABLET ORAL DAILY
Status: DISCONTINUED | OUTPATIENT
Start: 2023-04-10 | End: 2023-04-13

## 2023-04-10 RX ORDER — BENZONATATE 200 MG/1
200 CAPSULE ORAL EVERY 4 HOURS PRN
Status: DISCONTINUED | OUTPATIENT
Start: 2023-04-10 | End: 2023-04-13

## 2023-04-10 NOTE — ED INITIAL ASSESSMENT (HPI)
Pt arrived via EMS from home for c/o abdominal pain, n/v/d. Pt states woke up around midnight with vomiting and diarrhea.

## 2023-04-10 NOTE — ED QUICK NOTES
Orders for admission, patient is aware of plan and ready to go upstairs. Any questions, please call ED IRINA James  at extension [phone]. Vaccinated?  Yes  Type of COVID test sent: Rapid  COVID Suspicion level: None      Titratable drug(s) infusin.9NaCl  Rate: bolus    LOC at time of transport: A/Ox4    Other pertinent information: N/A    CIWA score= N/A  NIH score= N/A

## 2023-04-10 NOTE — PLAN OF CARE
Patient admitted from ED for n/v/d. Multiple episodes of emesis upon admission. Pt alert and oriented x4. On 2L/min O2 with sats in high 90s. Vital signs stable with elevated BP. Afebrile. During admission, Pt complained of chest and epigastric pain, 8/10. EKG performed per order x2. Echocardiogram ordered, not completed. Troponin series ordered and completed, to be repeated in AM per hospitalist. Scheduled medications given per [facility]. PRN pain medications given per MAR. Able to ambulate with walker and standby assist. Safety precautions in place. Call light within reach. Family at bedside. Will continue to monitor.     Problem: Diabetes/Glucose Control  Goal: Glucose maintained within prescribed range  Description: INTERVENTIONS:  - Monitor Blood Glucose as ordered  - Assess for signs and symptoms of hyperglycemia and hypoglycemia  - Administer ordered medications to maintain glucose within target range  - Assess barriers to adequate nutritional intake and initiate nutrition consult as needed  - Instruct patient on self management of diabetes  4/10/2023 1713 by Megan Fleming RN  Outcome: Progressing  4/10/2023 1711 by Megan Fleming RN  Outcome: Progressing     Problem: RESPIRATORY - ADULT  Goal: Achieves optimal ventilation and oxygenation  Description: INTERVENTIONS:  - Assess for changes in respiratory status  - Assess for changes in mentation and behavior  - Position to facilitate oxygenation and minimize respiratory effort  - Oxygen supplementation based on oxygen saturation or ABGs  - Provide Smoking Cessation handout, if applicable  - Encourage broncho-pulmonary hygiene including cough, deep breathe, Incentive Spirometry  - Assess the need for suctioning and perform as needed  - Assess and instruct to report SOB or any respiratory difficulty  - Respiratory Therapy support as indicated  - Manage/alleviate anxiety  - Monitor for signs/symptoms of CO2 retention  Outcome: Progressing     Problem: PAIN - ADULT  Goal: Verbalizes/displays adequate comfort level or patient's stated pain goal  Description: INTERVENTIONS:  - Encourage pt to monitor pain and request assistance  - Assess pain using appropriate pain scale  - Administer analgesics based on type and severity of pain and evaluate response  - Implement non-pharmacological measures as appropriate and evaluate response  - Consider cultural and social influences on pain and pain management  - Manage/alleviate anxiety  - Utilize distraction and/or relaxation techniques  - Monitor for opioid side effects  - Notify MD/LIP if interventions unsuccessful or patient reports new pain  - Anticipate increased pain with activity and pre-medicate as appropriate  Outcome: Not Progressing     Problem: GASTROINTESTINAL - ADULT  Goal: Minimal or absence of nausea and vomiting  Description: INTERVENTIONS:  - Maintain adequate hydration with IV or PO as ordered and tolerated  - Nasogastric tube to low intermittent suction as ordered  - Evaluate effectiveness of ordered antiemetic medications  - Provide nonpharmacologic comfort measures as appropriate  - Advance diet as tolerated, if ordered  - Obtain nutritional consult as needed  - Evaluate fluid balance  Outcome: Not Progressing  Goal: Maintains or returns to baseline bowel function  Description: INTERVENTIONS:  - Assess bowel function  - Maintain adequate hydration with IV or PO as ordered and tolerated  - Evaluate effectiveness of GI medications  - Encourage mobilization and activity  - Obtain nutritional consult as needed  - Establish a toileting routine/schedule  - Consider collaborating with pharmacy to review patient's medication profile  Outcome: Not Progressing  Goal: Maintains adequate nutritional intake (undernourished)  Description: INTERVENTIONS:  - Monitor percentage of each meal consumed  - Identify factors contributing to decreased intake, treat as appropriate  - Assist with meals as needed  - Monitor I&O, WT and lab values  - Obtain nutritional consult as needed  - Optimize oral hygiene and moisture  - Encourage food from home; allow for food preferences  - Enhance eating environment  Outcome: Not Progressing

## 2023-04-11 ENCOUNTER — APPOINTMENT (OUTPATIENT)
Dept: CV DIAGNOSTICS | Facility: HOSPITAL | Age: 78
End: 2023-04-11
Attending: INTERNAL MEDICINE
Payer: MEDICARE

## 2023-04-11 LAB
ANION GAP SERPL CALC-SCNC: 7 MMOL/L (ref 0–18)
ATRIAL RATE: 100 BPM
ATRIAL RATE: 97 BPM
BASOPHILS # BLD AUTO: 0.03 X10(3) UL (ref 0–0.2)
BASOPHILS NFR BLD AUTO: 0.2 %
BUN BLD-MCNC: 10 MG/DL (ref 7–18)
CALCIUM BLD-MCNC: 9.4 MG/DL (ref 8.5–10.1)
CHLORIDE SERPL-SCNC: 101 MMOL/L (ref 98–112)
CO2 SERPL-SCNC: 28 MMOL/L (ref 21–32)
CREAT BLD-MCNC: 0.76 MG/DL
EOSINOPHIL # BLD AUTO: 0.01 X10(3) UL (ref 0–0.7)
EOSINOPHIL NFR BLD AUTO: 0.1 %
ERYTHROCYTE [DISTWIDTH] IN BLOOD BY AUTOMATED COUNT: 13.2 %
GFR SERPLBLD BASED ON 1.73 SQ M-ARVRAT: 81 ML/MIN/1.73M2 (ref 60–?)
GLUCOSE BLD-MCNC: 100 MG/DL (ref 70–99)
GLUCOSE BLD-MCNC: 110 MG/DL (ref 70–99)
GLUCOSE BLD-MCNC: 131 MG/DL (ref 70–99)
GLUCOSE BLD-MCNC: 136 MG/DL (ref 70–99)
GLUCOSE BLD-MCNC: 92 MG/DL (ref 70–99)
HCT VFR BLD AUTO: 35.6 %
HGB BLD-MCNC: 11.1 G/DL
IMM GRANULOCYTES # BLD AUTO: 0.04 X10(3) UL (ref 0–1)
IMM GRANULOCYTES NFR BLD: 0.3 %
LYMPHOCYTES # BLD AUTO: 3.75 X10(3) UL (ref 1–4)
LYMPHOCYTES NFR BLD AUTO: 29.6 %
MCH RBC QN AUTO: 27 PG (ref 26–34)
MCHC RBC AUTO-ENTMCNC: 31.2 G/DL (ref 31–37)
MCV RBC AUTO: 86.6 FL
MONOCYTES # BLD AUTO: 1.22 X10(3) UL (ref 0.1–1)
MONOCYTES NFR BLD AUTO: 9.6 %
NEUTROPHILS # BLD AUTO: 7.61 X10 (3) UL (ref 1.5–7.7)
NEUTROPHILS # BLD AUTO: 7.61 X10(3) UL (ref 1.5–7.7)
NEUTROPHILS NFR BLD AUTO: 60.2 %
OSMOLALITY SERPL CALC.SUM OF ELEC: 282 MOSM/KG (ref 275–295)
P AXIS: 39 DEGREES
P AXIS: 51 DEGREES
P-R INTERVAL: 174 MS
P-R INTERVAL: 188 MS
PLATELET # BLD AUTO: 326 10(3)UL (ref 150–450)
POTASSIUM SERPL-SCNC: 3.3 MMOL/L (ref 3.5–5.1)
Q-T INTERVAL: 378 MS
Q-T INTERVAL: 380 MS
QRS DURATION: 90 MS
QRS DURATION: 90 MS
QTC CALCULATION (BEZET): 482 MS
QTC CALCULATION (BEZET): 487 MS
R AXIS: 35 DEGREES
R AXIS: 37 DEGREES
RBC # BLD AUTO: 4.11 X10(6)UL
SODIUM SERPL-SCNC: 136 MMOL/L (ref 136–145)
T AXIS: 44 DEGREES
T AXIS: 45 DEGREES
TROPONIN I HIGH SENSITIVITY: 156 NG/L
VENTRICULAR RATE: 100 BPM
VENTRICULAR RATE: 97 BPM
WBC # BLD AUTO: 12.7 X10(3) UL (ref 4–11)

## 2023-04-11 PROCEDURE — 93306 TTE W/DOPPLER COMPLETE: CPT | Performed by: INTERNAL MEDICINE

## 2023-04-11 PROCEDURE — 99232 SBSQ HOSP IP/OBS MODERATE 35: CPT | Performed by: INTERNAL MEDICINE

## 2023-04-11 RX ORDER — POTASSIUM CHLORIDE 20 MEQ/1
40 TABLET, EXTENDED RELEASE ORAL ONCE
Status: COMPLETED | OUTPATIENT
Start: 2023-04-11 | End: 2023-04-11

## 2023-04-11 RX ORDER — AMLODIPINE BESYLATE 5 MG/1
5 TABLET ORAL DAILY
Status: DISCONTINUED | OUTPATIENT
Start: 2023-04-11 | End: 2023-04-12

## 2023-04-11 NOTE — PROGRESS NOTES
Pt A&Ox4 on room air, uses 2L at night. Complaining of abdomen pain, administered prn pain medications as needed. Troponin was elevated during the day they did a repeat at midnight and still elevated at 147. Paged md and to notify. Call light within reach, frequent checks made, needs met. Spoke with MD placed order for cardiology to see pt.

## 2023-04-12 LAB
ANION GAP SERPL CALC-SCNC: 9 MMOL/L (ref 0–18)
BASOPHILS # BLD AUTO: 0.05 X10(3) UL (ref 0–0.2)
BASOPHILS NFR BLD AUTO: 0.4 %
BUN BLD-MCNC: 12 MG/DL (ref 7–18)
CALCIUM BLD-MCNC: 9.2 MG/DL (ref 8.5–10.1)
CHLORIDE SERPL-SCNC: 97 MMOL/L (ref 98–112)
CO2 SERPL-SCNC: 27 MMOL/L (ref 21–32)
CREAT BLD-MCNC: 0.68 MG/DL
EOSINOPHIL # BLD AUTO: 0.01 X10(3) UL (ref 0–0.7)
EOSINOPHIL NFR BLD AUTO: 0.1 %
ERYTHROCYTE [DISTWIDTH] IN BLOOD BY AUTOMATED COUNT: 13.2 %
GFR SERPLBLD BASED ON 1.73 SQ M-ARVRAT: 90 ML/MIN/1.73M2 (ref 60–?)
GLUCOSE BLD-MCNC: 109 MG/DL (ref 70–99)
GLUCOSE BLD-MCNC: 110 MG/DL (ref 70–99)
GLUCOSE BLD-MCNC: 113 MG/DL (ref 70–99)
GLUCOSE BLD-MCNC: 121 MG/DL (ref 70–99)
GLUCOSE BLD-MCNC: 94 MG/DL (ref 70–99)
HCT VFR BLD AUTO: 35 %
HGB BLD-MCNC: 11.1 G/DL
IMM GRANULOCYTES # BLD AUTO: 0.06 X10(3) UL (ref 0–1)
IMM GRANULOCYTES NFR BLD: 0.5 %
LYMPHOCYTES # BLD AUTO: 5.41 X10(3) UL (ref 1–4)
LYMPHOCYTES NFR BLD AUTO: 40.7 %
MCH RBC QN AUTO: 27.1 PG (ref 26–34)
MCHC RBC AUTO-ENTMCNC: 31.7 G/DL (ref 31–37)
MCV RBC AUTO: 85.6 FL
MONOCYTES # BLD AUTO: 1.19 X10(3) UL (ref 0.1–1)
MONOCYTES NFR BLD AUTO: 8.9 %
NEUTROPHILS # BLD AUTO: 6.58 X10 (3) UL (ref 1.5–7.7)
NEUTROPHILS # BLD AUTO: 6.58 X10(3) UL (ref 1.5–7.7)
NEUTROPHILS NFR BLD AUTO: 49.4 %
OSMOLALITY SERPL CALC.SUM OF ELEC: 276 MOSM/KG (ref 275–295)
PLATELET # BLD AUTO: 323 10(3)UL (ref 150–450)
POTASSIUM SERPL-SCNC: 3.9 MMOL/L (ref 3.5–5.1)
POTASSIUM SERPL-SCNC: 3.9 MMOL/L (ref 3.5–5.1)
RBC # BLD AUTO: 4.09 X10(6)UL
SODIUM SERPL-SCNC: 133 MMOL/L (ref 136–145)
WBC # BLD AUTO: 13.3 X10(3) UL (ref 4–11)

## 2023-04-12 PROCEDURE — 99232 SBSQ HOSP IP/OBS MODERATE 35: CPT | Performed by: INTERNAL MEDICINE

## 2023-04-12 RX ORDER — AMLODIPINE BESYLATE 10 MG/1
10 TABLET ORAL DAILY
Status: DISCONTINUED | OUTPATIENT
Start: 2023-04-13 | End: 2023-04-13

## 2023-04-12 RX ORDER — AMLODIPINE BESYLATE 5 MG/1
5 TABLET ORAL ONCE
Status: COMPLETED | OUTPATIENT
Start: 2023-04-12 | End: 2023-04-12

## 2023-04-12 RX ORDER — DICYCLOMINE HYDROCHLORIDE 10 MG/1
10 CAPSULE ORAL
Status: DISCONTINUED | OUTPATIENT
Start: 2023-04-12 | End: 2023-04-13

## 2023-04-12 RX ORDER — ASPIRIN 325 MG
325 TABLET ORAL DAILY
Refills: 0 | Status: SHIPPED | COMMUNITY
Start: 2023-04-13

## 2023-04-12 RX ORDER — AMLODIPINE BESYLATE 5 MG/1
5 TABLET ORAL DAILY
Qty: 30 TABLET | Refills: 0 | Status: SHIPPED | OUTPATIENT
Start: 2023-04-13 | End: 2023-04-13

## 2023-04-12 RX ORDER — PANTOPRAZOLE SODIUM 40 MG/1
40 TABLET, DELAYED RELEASE ORAL
Status: DISCONTINUED | OUTPATIENT
Start: 2023-04-12 | End: 2023-04-13

## 2023-04-12 NOTE — PLAN OF CARE
Patient alert and oriented x4. On room air with occasional 1L O2 for comfort, sats in high 90s. Vital signs stable. Afebrile. Pt complains of pain in abdomen. PRN pain medications given. Troponin levels continue to rise, cardiology notified and will see today. Scheduled medications given per MAR. Pt able to ambulate with walker and standby assist. Safety precautions in place. Call light within reach. Family at bedside. Will continue to monitor. Update at 1800: Echocardiogram performed, awaiting results.     Problem: Diabetes/Glucose Control  Goal: Glucose maintained within prescribed range  Description: INTERVENTIONS:  - Monitor Blood Glucose as ordered  - Assess for signs and symptoms of hyperglycemia and hypoglycemia  - Administer ordered medications to maintain glucose within target range  - Assess barriers to adequate nutritional intake and initiate nutrition consult as needed  - Instruct patient on self management of diabetes  Outcome: Progressing     Problem: PAIN - ADULT  Goal: Verbalizes/displays adequate comfort level or patient's stated pain goal  Description: INTERVENTIONS:  - Encourage pt to monitor pain and request assistance  - Assess pain using appropriate pain scale  - Administer analgesics based on type and severity of pain and evaluate response  - Implement non-pharmacological measures as appropriate and evaluate response  - Consider cultural and social influences on pain and pain management  - Manage/alleviate anxiety  - Utilize distraction and/or relaxation techniques  - Monitor for opioid side effects  - Notify MD/LIP if interventions unsuccessful or patient reports new pain  - Anticipate increased pain with activity and pre-medicate as appropriate  Outcome: Progressing     Problem: RESPIRATORY - ADULT  Goal: Achieves optimal ventilation and oxygenation  Description: INTERVENTIONS:  - Assess for changes in respiratory status  - Assess for changes in mentation and behavior  - Position to facilitate oxygenation and minimize respiratory effort  - Oxygen supplementation based on oxygen saturation or ABGs  - Provide Smoking Cessation handout, if applicable  - Encourage broncho-pulmonary hygiene including cough, deep breathe, Incentive Spirometry  - Assess the need for suctioning and perform as needed  - Assess and instruct to report SOB or any respiratory difficulty  - Respiratory Therapy support as indicated  - Manage/alleviate anxiety  - Monitor for signs/symptoms of CO2 retention  Outcome: Progressing     Problem: GASTROINTESTINAL - ADULT  Goal: Minimal or absence of nausea and vomiting  Description: INTERVENTIONS:  - Maintain adequate hydration with IV or PO as ordered and tolerated  - Nasogastric tube to low intermittent suction as ordered  - Evaluate effectiveness of ordered antiemetic medications  - Provide nonpharmacologic comfort measures as appropriate  - Advance diet as tolerated, if ordered  - Obtain nutritional consult as needed  - Evaluate fluid balance  Outcome: Progressing  Goal: Maintains or returns to baseline bowel function  Description: INTERVENTIONS:  - Assess bowel function  - Maintain adequate hydration with IV or PO as ordered and tolerated  - Evaluate effectiveness of GI medications  - Encourage mobilization and activity  - Obtain nutritional consult as needed  - Establish a toileting routine/schedule  - Consider collaborating with pharmacy to review patient's medication profile  Outcome: Progressing  Goal: Maintains adequate nutritional intake (undernourished)  Description: INTERVENTIONS:  - Monitor percentage of each meal consumed  - Identify factors contributing to decreased intake, treat as appropriate  - Assist with meals as needed  - Monitor I&O, WT and lab values  - Obtain nutritional consult as needed  - Optimize oral hygiene and moisture  - Encourage food from home; allow for food preferences  - Enhance eating environment  Outcome: Progressing

## 2023-04-12 NOTE — PLAN OF CARE
Mild dyspnea with exertion. Saturation 94% on room air. Sating 98-99% on 2 LNC, baseline at home. C/o back pain. Prn norco/ toradol given with good relief. Poor appetite. Encouraged po intake. B/p 179/81 MD notified. Rechecked 161/83. Denies chest pain overnight. Up to the bathroom with a walker, standby assist. Sits in the chair in the morning.    Problem: PAIN - ADULT  Goal: Verbalizes/displays adequate comfort level or patient's stated pain goal  Description: INTERVENTIONS:  - Encourage pt to monitor pain and request assistance  - Assess pain using appropriate pain scale  - Administer analgesics based on type and severity of pain and evaluate response  - Implement non-pharmacological measures as appropriate and evaluate response  - Consider cultural and social influences on pain and pain management  - Manage/alleviate anxiety  - Utilize distraction and/or relaxation techniques  - Monitor for opioid side effects  - Notify MD/LIP if interventions unsuccessful or patient reports new pain  - Anticipate increased pain with activity and pre-medicate as appropriate  Outcome: Progressing

## 2023-04-13 VITALS
OXYGEN SATURATION: 97 % | WEIGHT: 194 LBS | SYSTOLIC BLOOD PRESSURE: 142 MMHG | DIASTOLIC BLOOD PRESSURE: 64 MMHG | HEART RATE: 96 BPM | RESPIRATION RATE: 18 BRPM | BODY MASS INDEX: 33 KG/M2 | TEMPERATURE: 98 F

## 2023-04-13 LAB
GLUCOSE BLD-MCNC: 93 MG/DL (ref 70–99)
GLUCOSE BLD-MCNC: 97 MG/DL (ref 70–99)

## 2023-04-13 PROCEDURE — 99239 HOSP IP/OBS DSCHRG MGMT >30: CPT | Performed by: INTERNAL MEDICINE

## 2023-04-13 RX ORDER — IPRATROPIUM BROMIDE AND ALBUTEROL SULFATE 2.5; .5 MG/3ML; MG/3ML
3 SOLUTION RESPIRATORY (INHALATION) 4 TIMES DAILY PRN
Status: SHIPPED | COMMUNITY
Start: 2023-04-13

## 2023-04-13 RX ORDER — AMLODIPINE BESYLATE 10 MG/1
10 TABLET ORAL DAILY
Qty: 30 TABLET | Refills: 1 | Status: SHIPPED | OUTPATIENT
Start: 2023-04-14

## 2023-04-13 RX ORDER — ONDANSETRON 4 MG/1
4 TABLET, FILM COATED ORAL ONCE
Status: COMPLETED | OUTPATIENT
Start: 2023-04-13 | End: 2023-04-13

## 2023-04-13 RX ORDER — DICYCLOMINE HYDROCHLORIDE 10 MG/1
10 CAPSULE ORAL
Qty: 20 CAPSULE | Refills: 0 | Status: SHIPPED | OUTPATIENT
Start: 2023-04-13 | End: 2023-04-18

## 2023-04-13 NOTE — PLAN OF CARE
Problem: Diabetes/Glucose Control  Goal: Glucose maintained within prescribed range  Description: INTERVENTIONS:  - Monitor Blood Glucose as ordered  - Assess for signs and symptoms of hyperglycemia and hypoglycemia  - Administer ordered medications to maintain glucose within target range  - Assess barriers to adequate nutritional intake and initiate nutrition consult as needed  - Instruct patient on self management of diabetes  Outcome: Progressing     Problem: Patient/Family Goals  Goal: Patient/Family Long Term Goal  Description: Patient's Long Term Goal:     Interventions:  -   - See additional Care Plan goals for specific interventions  Outcome: Progressing  Goal: Patient/Family Short Term Goal  Description: Patient's Short Term Goal:     Interventions:   -   - See additional Care Plan goals for specific interventions  Outcome: Progressing     Problem: PAIN - ADULT  Goal: Verbalizes/displays adequate comfort level or patient's stated pain goal  Description: INTERVENTIONS:  - Encourage pt to monitor pain and request assistance  - Assess pain using appropriate pain scale  - Administer analgesics based on type and severity of pain and evaluate response  - Implement non-pharmacological measures as appropriate and evaluate response  - Consider cultural and social influences on pain and pain management  - Manage/alleviate anxiety  - Utilize distraction and/or relaxation techniques  - Monitor for opioid side effects  - Notify MD/LIP if interventions unsuccessful or patient reports new pain  - Anticipate increased pain with activity and pre-medicate as appropriate  Outcome: Progressing     Problem: RESPIRATORY - ADULT  Goal: Achieves optimal ventilation and oxygenation  Description: INTERVENTIONS:  - Assess for changes in respiratory status  - Assess for changes in mentation and behavior  - Position to facilitate oxygenation and minimize respiratory effort  - Oxygen supplementation based on oxygen saturation or ABGs  - Provide Smoking Cessation handout, if applicable  - Encourage broncho-pulmonary hygiene including cough, deep breathe, Incentive Spirometry  - Assess the need for suctioning and perform as needed  - Assess and instruct to report SOB or any respiratory difficulty  - Respiratory Therapy support as indicated  - Manage/alleviate anxiety  - Monitor for signs/symptoms of CO2 retention  Outcome: Progressing     Problem: GASTROINTESTINAL - ADULT  Goal: Minimal or absence of nausea and vomiting  Description: INTERVENTIONS:  - Maintain adequate hydration with IV or PO as ordered and tolerated  - Nasogastric tube to low intermittent suction as ordered  - Evaluate effectiveness of ordered antiemetic medications  - Provide nonpharmacologic comfort measures as appropriate  - Advance diet as tolerated, if ordered  - Obtain nutritional consult as needed  - Evaluate fluid balance  Outcome: Progressing  Goal: Maintains or returns to baseline bowel function  Description: INTERVENTIONS:  - Assess bowel function  - Maintain adequate hydration with IV or PO as ordered and tolerated  - Evaluate effectiveness of GI medications  - Encourage mobilization and activity  - Obtain nutritional consult as needed  - Establish a toileting routine/schedule  - Consider collaborating with pharmacy to review patient's medication profile  Outcome: Progressing  Goal: Maintains adequate nutritional intake (undernourished)  Description: INTERVENTIONS:  - Monitor percentage of each meal consumed  - Identify factors contributing to decreased intake, treat as appropriate  - Assist with meals as needed  - Monitor I&O, WT and lab values  - Obtain nutritional consult as needed  - Optimize oral hygiene and moisture  - Encourage food from home; allow for food preferences  - Enhance eating environment  Outcome: Progressing     BP elevated- see flowsheet, page with return call to cardiology in AM- discussed with Stefany Aldana cardiac APN, cards will review medications. BPs discussed with Dr. Suzi Dakins, orders received, additional amlodipine given, BP improved upon reassessment. Patient tolerates small amount of PO intake, pt states that her appetite is \"fair\", c/o generalized pain to abd, medications given see MAR. Patient ambulates with standby assist, up to chair during shift. Denies nausea. Patient updated progressing and in agreement with POC. Fall and safety precautions in place. Patient lost IV access and pt states that she is a difficult stick and requires IV team for placement, Dr. Linh Cardoza. Arnulfo aware and orders for pt to remain without PIV.

## 2023-04-13 NOTE — PLAN OF CARE
Pt received A&Ox4. VSS. Afebrile. 2L NC overnight, otherwise on RA. Pt c/o nausea/R shoulder + abd pain, norco & zofran given prn. Medications given per MAR. Call light within reach. Fall precautions in place.      Problem: Diabetes/Glucose Control  Goal: Glucose maintained within prescribed range  Description: INTERVENTIONS:  - Monitor Blood Glucose as ordered  - Assess for signs and symptoms of hyperglycemia and hypoglycemia  - Administer ordered medications to maintain glucose within target range  - Assess barriers to adequate nutritional intake and initiate nutrition consult as needed  - Instruct patient on self management of diabetes  Outcome: Progressing     Problem: PAIN - ADULT  Goal: Verbalizes/displays adequate comfort level or patient's stated pain goal  Description: INTERVENTIONS:  - Encourage pt to monitor pain and request assistance  - Assess pain using appropriate pain scale  - Administer analgesics based on type and severity of pain and evaluate response  - Implement non-pharmacological measures as appropriate and evaluate response  - Consider cultural and social influences on pain and pain management  - Manage/alleviate anxiety  - Utilize distraction and/or relaxation techniques  - Monitor for opioid side effects  - Notify MD/LIP if interventions unsuccessful or patient reports new pain  - Anticipate increased pain with activity and pre-medicate as appropriate  Outcome: Progressing     Problem: RESPIRATORY - ADULT  Goal: Achieves optimal ventilation and oxygenation  Description: INTERVENTIONS:  - Assess for changes in respiratory status  - Assess for changes in mentation and behavior  - Position to facilitate oxygenation and minimize respiratory effort  - Oxygen supplementation based on oxygen saturation or ABGs  - Provide Smoking Cessation handout, if applicable  - Encourage broncho-pulmonary hygiene including cough, deep breathe, Incentive Spirometry  - Assess the need for suctioning and perform as needed  - Assess and instruct to report SOB or any respiratory difficulty  - Respiratory Therapy support as indicated  - Manage/alleviate anxiety  - Monitor for signs/symptoms of CO2 retention  Outcome: Progressing     Problem: GASTROINTESTINAL - ADULT  Goal: Minimal or absence of nausea and vomiting  Description: INTERVENTIONS:  - Maintain adequate hydration with IV or PO as ordered and tolerated  - Nasogastric tube to low intermittent suction as ordered  - Evaluate effectiveness of ordered antiemetic medications  - Provide nonpharmacologic comfort measures as appropriate  - Advance diet as tolerated, if ordered  - Obtain nutritional consult as needed  - Evaluate fluid balance  Outcome: Progressing  Goal: Maintains or returns to baseline bowel function  Description: INTERVENTIONS:  - Assess bowel function  - Maintain adequate hydration with IV or PO as ordered and tolerated  - Evaluate effectiveness of GI medications  - Encourage mobilization and activity  - Obtain nutritional consult as needed  - Establish a toileting routine/schedule  - Consider collaborating with pharmacy to review patient's medication profile  Outcome: Progressing  Goal: Maintains adequate nutritional intake (undernourished)  Description: INTERVENTIONS:  - Monitor percentage of each meal consumed  - Identify factors contributing to decreased intake, treat as appropriate  - Assist with meals as needed  - Monitor I&O, WT and lab values  - Obtain nutritional consult as needed  - Optimize oral hygiene and moisture  - Encourage food from home; allow for food preferences  - Enhance eating environment  Outcome: Progressing

## 2023-04-13 NOTE — PROGRESS NOTES
NURSING DISCHARGE NOTE    Discharged Home via Wheelchair. Accompanied by Support staff  Belongings Taken by patient/family. Pt discharged home at apporx. 1430. AVS discussed w/ pt. All belongings went w/ pt.

## 2023-04-13 NOTE — DISCHARGE INSTRUCTIONS
Sometimes managing your health at home requires assistance. The [facility] team has recognized your preference to use [facility]. They can be reached at [phone]. The fax number for your reference is [phone]  A representative from the home health agency will contact you or your family to schedule your first visit.

## 2023-04-13 NOTE — CM/SW NOTE
04/13/23 1500   Discharge disposition   Expected discharge disposition Home-Health   Post Acute Care Provider [id number] [facility] notified of dc and AVS sent to them.     Naman Hastings LCSW  /Discharge Planner

## 2023-04-13 NOTE — PLAN OF CARE
Pt A/O x4. VSS. Afebrile. Pt c/o 8/10 stomach/abd. Pain. PRN norco admin per MAR. Pt reports relief w/ medication. Medications admin per MAR. Pt cleared for DC. Fall and safety precautions in place. Call light within reach.

## 2023-04-13 NOTE — CM/SW NOTE
Met with pt and provided her with [address] and she chose [facility]- reserved in aidin.     Nelda Whittington, VANESSAW  /Discharge Planner

## 2023-04-20 DIAGNOSIS — E87.1 HYPONATREMIA: Primary | ICD-10-CM

## 2023-04-20 DIAGNOSIS — D64.9 ANEMIA, UNSPECIFIED TYPE: ICD-10-CM

## 2023-05-03 ENCOUNTER — LAB ENCOUNTER (OUTPATIENT)
Dept: LAB | Facility: HOSPITAL | Age: 78
End: 2023-05-03
Attending: STUDENT IN AN ORGANIZED HEALTH CARE EDUCATION/TRAINING PROGRAM
Payer: MEDICARE

## 2023-05-03 DIAGNOSIS — D64.9 ANEMIA, UNSPECIFIED TYPE: ICD-10-CM

## 2023-05-03 DIAGNOSIS — E87.1 HYPONATREMIA: ICD-10-CM

## 2023-05-03 LAB
ANION GAP SERPL CALC-SCNC: 4 MMOL/L (ref 0–18)
BASOPHILS # BLD AUTO: 0.05 X10(3) UL (ref 0–0.2)
BASOPHILS NFR BLD AUTO: 0.4 %
BUN BLD-MCNC: 15 MG/DL (ref 7–18)
CALCIUM BLD-MCNC: 9.7 MG/DL (ref 8.5–10.1)
CHLORIDE SERPL-SCNC: 108 MMOL/L (ref 98–112)
CO2 SERPL-SCNC: 28 MMOL/L (ref 21–32)
CREAT BLD-MCNC: 1.23 MG/DL
EOSINOPHIL # BLD AUTO: 0.31 X10(3) UL (ref 0–0.7)
EOSINOPHIL NFR BLD AUTO: 2.6 %
ERYTHROCYTE [DISTWIDTH] IN BLOOD BY AUTOMATED COUNT: 13.8 %
FASTING STATUS PATIENT QL REPORTED: NO
GFR SERPLBLD BASED ON 1.73 SQ M-ARVRAT: 45 ML/MIN/1.73M2 (ref 60–?)
GLUCOSE BLD-MCNC: 93 MG/DL (ref 70–99)
HCT VFR BLD AUTO: 34.9 %
HGB BLD-MCNC: 10.7 G/DL
IMM GRANULOCYTES # BLD AUTO: 0.01 X10(3) UL (ref 0–1)
IMM GRANULOCYTES NFR BLD: 0.1 %
LYMPHOCYTES # BLD AUTO: 6.89 X10(3) UL (ref 1–4)
LYMPHOCYTES NFR BLD AUTO: 58.2 %
MCH RBC QN AUTO: 26.9 PG (ref 26–34)
MCHC RBC AUTO-ENTMCNC: 30.7 G/DL (ref 31–37)
MCV RBC AUTO: 87.7 FL
MONOCYTES # BLD AUTO: 0.96 X10(3) UL (ref 0.1–1)
MONOCYTES NFR BLD AUTO: 8.1 %
NEUTROPHILS # BLD AUTO: 3.61 X10 (3) UL (ref 1.5–7.7)
NEUTROPHILS # BLD AUTO: 3.61 X10(3) UL (ref 1.5–7.7)
NEUTROPHILS NFR BLD AUTO: 30.6 %
OSMOLALITY SERPL CALC.SUM OF ELEC: 291 MOSM/KG (ref 275–295)
PLATELET # BLD AUTO: 333 10(3)UL (ref 150–450)
POTASSIUM SERPL-SCNC: 3.6 MMOL/L (ref 3.5–5.1)
RBC # BLD AUTO: 3.98 X10(6)UL
SODIUM SERPL-SCNC: 140 MMOL/L (ref 136–145)
WBC # BLD AUTO: 11.8 X10(3) UL (ref 4–11)

## 2023-05-03 PROCEDURE — 80048 BASIC METABOLIC PNL TOTAL CA: CPT

## 2023-05-03 PROCEDURE — 36415 COLL VENOUS BLD VENIPUNCTURE: CPT

## 2023-05-03 PROCEDURE — 85025 COMPLETE CBC W/AUTO DIFF WBC: CPT

## 2023-05-05 DIAGNOSIS — E87.6 HYPOKALEMIA: Primary | ICD-10-CM

## 2023-05-08 ENCOUNTER — PATIENT MESSAGE (OUTPATIENT)
Facility: CLINIC | Age: 78
End: 2023-05-08

## 2023-05-31 ENCOUNTER — LAB ENCOUNTER (OUTPATIENT)
Dept: LAB | Facility: HOSPITAL | Age: 78
End: 2023-05-31
Attending: STUDENT IN AN ORGANIZED HEALTH CARE EDUCATION/TRAINING PROGRAM
Payer: MEDICARE

## 2023-05-31 DIAGNOSIS — E87.6 HYPOKALEMIA: ICD-10-CM

## 2023-05-31 LAB
ANION GAP SERPL CALC-SCNC: 2 MMOL/L (ref 0–18)
BUN BLD-MCNC: 15 MG/DL (ref 7–18)
CALCIUM BLD-MCNC: 9.7 MG/DL (ref 8.5–10.1)
CHLORIDE SERPL-SCNC: 104 MMOL/L (ref 98–112)
CO2 SERPL-SCNC: 30 MMOL/L (ref 21–32)
CREAT BLD-MCNC: 0.96 MG/DL
FASTING STATUS PATIENT QL REPORTED: YES
GFR SERPLBLD BASED ON 1.73 SQ M-ARVRAT: 61 ML/MIN/1.73M2 (ref 60–?)
GLUCOSE BLD-MCNC: 113 MG/DL (ref 70–99)
OSMOLALITY SERPL CALC.SUM OF ELEC: 284 MOSM/KG (ref 275–295)
POTASSIUM SERPL-SCNC: 4.1 MMOL/L (ref 3.5–5.1)
SODIUM SERPL-SCNC: 136 MMOL/L (ref 136–145)

## 2023-05-31 PROCEDURE — 36415 COLL VENOUS BLD VENIPUNCTURE: CPT

## 2023-05-31 PROCEDURE — 80048 BASIC METABOLIC PNL TOTAL CA: CPT

## 2023-06-29 ENCOUNTER — OFFICE VISIT (OUTPATIENT)
Facility: CLINIC | Age: 78
End: 2023-06-29
Payer: COMMERCIAL

## 2023-06-29 VITALS
HEIGHT: 64.5 IN | HEART RATE: 82 BPM | SYSTOLIC BLOOD PRESSURE: 120 MMHG | RESPIRATION RATE: 16 BRPM | BODY MASS INDEX: 31.37 KG/M2 | WEIGHT: 186 LBS | OXYGEN SATURATION: 99 % | DIASTOLIC BLOOD PRESSURE: 62 MMHG

## 2023-06-29 DIAGNOSIS — J96.11 CHRONIC RESPIRATORY FAILURE WITH HYPOXIA (HCC): ICD-10-CM

## 2023-06-29 DIAGNOSIS — J98.09 BRONCHIAL STENOSIS: ICD-10-CM

## 2023-06-29 DIAGNOSIS — R06.00 DYSPNEA, UNSPECIFIED TYPE: Primary | ICD-10-CM

## 2023-06-29 DIAGNOSIS — J18.9 PNEUMONIA OF RIGHT LUNG DUE TO INFECTIOUS ORGANISM, UNSPECIFIED PART OF LUNG: ICD-10-CM

## 2023-06-29 DIAGNOSIS — J44.9 CHRONIC OBSTRUCTIVE PULMONARY DISEASE, UNSPECIFIED COPD TYPE (HCC): ICD-10-CM

## 2023-06-29 PROCEDURE — 3008F BODY MASS INDEX DOCD: CPT | Performed by: INTERNAL MEDICINE

## 2023-06-29 PROCEDURE — 3078F DIAST BP <80 MM HG: CPT | Performed by: INTERNAL MEDICINE

## 2023-06-29 PROCEDURE — 3074F SYST BP LT 130 MM HG: CPT | Performed by: INTERNAL MEDICINE

## 2023-06-29 PROCEDURE — 99214 OFFICE O/P EST MOD 30 MIN: CPT | Performed by: INTERNAL MEDICINE

## 2023-06-29 PROCEDURE — 1159F MED LIST DOCD IN RCRD: CPT | Performed by: INTERNAL MEDICINE

## 2023-06-29 PROCEDURE — 1160F RVW MEDS BY RX/DR IN RCRD: CPT | Performed by: INTERNAL MEDICINE

## 2023-06-29 RX ORDER — TIZANIDINE 2 MG/1
TABLET ORAL
COMMUNITY

## 2023-06-29 RX ORDER — METHOCARBAMOL 500 MG/1
500 TABLET, FILM COATED ORAL 3 TIMES DAILY
COMMUNITY
Start: 2023-04-24

## 2023-09-29 ENCOUNTER — HOSPITAL ENCOUNTER (OUTPATIENT)
Age: 78
Discharge: HOME | End: 2023-09-29

## 2023-09-29 VITALS
HEIGHT: 64 IN | DIASTOLIC BLOOD PRESSURE: 43 MMHG | WEIGHT: 184 LBS | TEMPERATURE: 99 F | OXYGEN SATURATION: 100 % | RESPIRATION RATE: 22 BRPM | BODY MASS INDEX: 31.41 KG/M2 | HEART RATE: 89 BPM | SYSTOLIC BLOOD PRESSURE: 123 MMHG

## 2023-09-29 DIAGNOSIS — H00.014 HORDEOLUM EXTERNUM OF LEFT UPPER EYELID: Primary | ICD-10-CM

## 2023-09-29 PROCEDURE — 99213 OFFICE O/P EST LOW 20 MIN: CPT

## 2023-09-29 PROCEDURE — 99212 OFFICE O/P EST SF 10 MIN: CPT

## 2023-09-29 NOTE — ED INITIAL ASSESSMENT (HPI)
Patient c/o redness and swelling to left eyelid starting yesterday. Patient reports some discharge and itching.

## 2024-01-04 ENCOUNTER — OFFICE VISIT (OUTPATIENT)
Facility: CLINIC | Age: 79
End: 2024-01-04
Payer: COMMERCIAL

## 2024-01-04 VITALS
HEART RATE: 81 BPM | BODY MASS INDEX: 33.29 KG/M2 | HEIGHT: 64 IN | DIASTOLIC BLOOD PRESSURE: 70 MMHG | RESPIRATION RATE: 16 BRPM | SYSTOLIC BLOOD PRESSURE: 122 MMHG | OXYGEN SATURATION: 95 % | WEIGHT: 195 LBS

## 2024-01-04 DIAGNOSIS — J98.09 BRONCHIAL STENOSIS: ICD-10-CM

## 2024-01-04 DIAGNOSIS — J44.9 CHRONIC OBSTRUCTIVE PULMONARY DISEASE, UNSPECIFIED COPD TYPE (HCC): ICD-10-CM

## 2024-01-04 DIAGNOSIS — J96.11 CHRONIC RESPIRATORY FAILURE WITH HYPOXIA (HCC): Primary | ICD-10-CM

## 2024-01-04 DIAGNOSIS — R06.00 DYSPNEA, UNSPECIFIED TYPE: ICD-10-CM

## 2024-01-04 PROCEDURE — 99214 OFFICE O/P EST MOD 30 MIN: CPT | Performed by: NURSE PRACTITIONER

## 2024-01-04 PROCEDURE — 3074F SYST BP LT 130 MM HG: CPT | Performed by: NURSE PRACTITIONER

## 2024-01-04 PROCEDURE — 1160F RVW MEDS BY RX/DR IN RCRD: CPT | Performed by: NURSE PRACTITIONER

## 2024-01-04 PROCEDURE — 3078F DIAST BP <80 MM HG: CPT | Performed by: NURSE PRACTITIONER

## 2024-01-04 PROCEDURE — 1159F MED LIST DOCD IN RCRD: CPT | Performed by: NURSE PRACTITIONER

## 2024-01-04 PROCEDURE — 3008F BODY MASS INDEX DOCD: CPT | Performed by: NURSE PRACTITIONER

## 2024-01-04 RX ORDER — AZITHROMYCIN 250 MG/1
TABLET, FILM COATED ORAL
Qty: 6 TABLET | Refills: 0 | Status: SHIPPED | OUTPATIENT
Start: 2024-01-04

## 2024-01-04 RX ORDER — PREDNISONE 10 MG/1
TABLET ORAL
Qty: 18 TABLET | Refills: 0 | Status: SHIPPED | OUTPATIENT
Start: 2024-01-04

## 2024-01-04 NOTE — PATIENT INSTRUCTIONS
Use saline nebs twice daily  Use duonebs 4 times day; use prior to saline nebs in the morning and bedtime followed by the flutter valve  Start azithromycin and steroids today as directed  Continue Breztri twice a day and rinse your mouth after wards  Talk on the phone in 2 weeks  or sooner if needed to check respiratory status  Schedule a 6 minute walk test 3 weeks  You may check your oxygen level at home and increase oxygen by 1liter if saturation < 88-90%  Please contact office at [phone] with any decline in respiratory status, questions or concerns

## 2024-01-04 NOTE — PROGRESS NOTES
Subjective:   Lisbet Velarde is a 78 year old female who presents for 6 month follow - up. Reports having increased sob with ambulation and minimal activity. Also with pain on right side lung and lower back managed by PCP. Usually has non-productive cough; now with greeen phlegm today. Increased fatigue and using more oxygen; 2L at rest and activity. Has not checked her pulse ox with activity. Using NS nebs BID, duonebs daily, breztri BID.    Previously 6/2023 Dr Urrutia  a 78 year old female never smoker with PMH sarcoidosis complicated by airway involvement s/p ALEXI/BI metallic stent placed at [facility] 1996 further complicated by bronchial stenosis, CHF, HTN who presents today for follow up. She denies acute concerns today. Breathing is stable. no cough at present, has been better controlled recently. No fevers. No pain     March 2023 Previously  She denies new concerns today, but does admit to having recent worsening cough with green phlegm a couple weeks ago, called her PCP and managed on steroids and abx, now feels better. Dyspnea overall is worse over the last few years, today is stable and better than when hospitalized. No fevers. No chest pain/pressure.      Dec 2023 Previously  She has followed with me the last few years for sarcoidosis and asthma/COPD, but was last seen in 2020.  She was just hospitalized in mid November 2022 with pneumonia and feels better since discharge, but not baseline. Still fatigued and having back pain. did have scant hemoptysis leading to her hospitalization, now resolved. Still with white/clear phlegm. Her breathing has improved, using brezri twice a day. Has albuterol mdi and nebulizers but rarely uses. We had previously given her VEST and saline nebs but she did like either therapy and discontinued them. She denies chest pain/pressure, pleurisy, f/c/s.     History/Other:    Chief Complaint Reviewed and Verified  Nursing Notes Reviewed and   Verified  Tobacco Reviewed   Allergies Reviewed  Medications Reviewed    Problem List Reviewed  Medical History Reviewed  Surgical History   Reviewed  Family History Reviewed  Social History Reviewed         Tobacco:  She has never smoked tobacco.    Current Outpatient Medications   Medication Sig Dispense Refill    azithromycin 250 MG Oral Tab take 2 tablet (500MG)  by ORAL route  every day for 1 day then 1 tablet (250 mg) by oral route once daily for 4 days 6 tablet 0    predniSONE 10 MG Oral Tab Take 3 tabs (30mg) daily for 3 days, then take 2 tabs (20mg) daily for 3 days, then take 1 tab (10mg) daily for 3 days. 18 tablet 0    methocarbamol 500 MG Oral Tab Take 1 tablet (500 mg total) by mouth 3 (three) times daily.      tiZANidine 2 MG Oral Tab 1 tablet as needed Orally Three times a day for 30 days      ipratropium-albuterol 0.5-2.5 (3) MG/3ML Inhalation Solution Take 3 mL by nebulization 4 (four) times daily as needed.      amLODIPine 10 MG Oral Tab Take 1 tablet (10 mg total) by mouth daily. 30 tablet 1    benzonatate 200 MG Oral Cap TAKE ONE CAPSULE BY MOUTH TWICE DAILY for 30      hydroCHLOROthiazide 12.5 MG Oral Tab Take 1 tablet (12.5 mg total) by mouth daily.      pantoprazole 40 MG Oral Tab EC 1 tablet (40 mg total) daily.      HYDROcodone-acetaminophen  MG Oral Tab Take 1 tablet by mouth every 6 (six) hours as needed. 21 tablet 0    metoprolol tartrate 25 MG Oral Tab Take 1 tablet (25 mg total) by mouth 2x Daily(Beta Blocker). 60 tablet 0    docusate sodium 100 MG Oral Cap Take 100 mg by mouth 2 (two) times daily. 60 capsule 3    PEG 3350 17 g Oral Powd Pack Take 17 g by mouth daily as needed. 30 each 3    metFORMIN HCl  MG Oral Tablet 24 Hr Take 1 tablet (500 mg total) by mouth daily with dinner. (evening meal)      Budesonide-Formoterol Fumarate 160-4.5 MCG/ACT Inhalation Aerosol Inhale 2 puffs into the lungs daily.      Multiple Vitamin (TAB-A-BELLA) Oral Tab Take 1 tablet by mouth daily.      atorvastatin 10  MG Oral Tab Take 1 tablet (10 mg total) by mouth nightly.      Losartan Potassium-HCTZ 100-25 MG Oral Tab Take 1 tablet by mouth daily.      Albuterol Sulfate  (90 Base) MCG/ACT Inhalation Aero Soln Inhale 2 puffs into the lungs every 4 (four) hours as needed for Wheezing or Shortness of Breath. CARRY YOUR INHALER WITH YOU. 1 Inhaler 0    aspirin 325 MG Oral Tab Take 1 tablet (325 mg total) by mouth daily. (Patient not taking: Reported on 6/29/2023)  0         Review of Systems:  Review of Systems   Constitutional:  Positive for fatigue. Negative for activity change, appetite change, chills, diaphoresis, fever and unexpected weight change.   HENT:  Negative for congestion, postnasal drip, rhinorrhea, sinus pressure, sinus pain, sneezing, sore throat, trouble swallowing and voice change.    Respiratory:  Positive for cough and shortness of breath. Negative for apnea, choking, chest tightness, wheezing and stridor.    Cardiovascular:  Negative for chest pain, palpitations and leg swelling.   Musculoskeletal:  Negative for arthralgias.   Skin:  Negative for pallor and rash.   Allergic/Immunologic: Negative for immunocompromised state.   Neurological:  Negative for dizziness and headaches.   Hematological:  Negative for adenopathy.         Objective:   /70 (BP Location: Right arm, Patient Position: Sitting, Cuff Size: adult)   Pulse 81   Resp 16   Ht 5' 4\" (1.626 m)   Wt 195 lb (88.5 kg)   SpO2 95%   BMI 33.47 kg/m²  Estimated body mass index is 33.47 kg/m² as calculated from the following:    Height as of this encounter: 5' 4\" (1.626 m).    Weight as of this encounter: 195 lb (88.5 kg).  Physical Exam  Vitals reviewed.   Constitutional:       Appearance: Normal appearance. She is normal weight.   HENT:      Head: Normocephalic and atraumatic.      Mouth/Throat:      Mouth: Mucous membranes are moist.   Eyes:      Extraocular Movements: Extraocular movements intact.   Cardiovascular:      Rate and  Rhythm: Normal rate and regular rhythm.      Pulses: Normal pulses.      Heart sounds: Normal heart sounds.   Pulmonary:      Effort: Pulmonary effort is normal.      Breath sounds: Normal air entry.      Comments: Clear upper lobes, few scattered wheezes bilaterally and decreased right base  Abdominal:      Palpations: Abdomen is soft.   Musculoskeletal:         General: Normal range of motion.      Cervical back: Normal range of motion.   Skin:     General: Skin is warm and dry.   Neurological:      General: No focal deficit present.      Mental Status: She is alert and oriented to person, place, and time.   Psychiatric:         Mood and Affect: Mood normal.         Behavior: Behavior normal.           Assessment & Plan:   #1. pneumonia  R sided pneumonia, may have been viral related (reports her granddaughter was sick first)  s/p abx and steroids while hospitalized 11/2022 at [facility]  2/2023 CT chest with improvement in RUL but continued consolidation in RML an RLL. Right sided airways remain occluded and stent appears in place.   We reviewed her imaging findings in detail. There is some improvement in the RUL however the middle and lower lobe bronchi and parenchyma is completely obstructed. She has no interest in any procedures. We reviewed VEST therapy and she continues to decline. She just wishes to use her inhalers and no other therapies. She will consider repeating pulm rehab - completed previously in 2019.  1/2024 stable; no recent imaging     #2. bronchiectasis  Noted on imaging with right sided bronchiectasis related to chronic right main stem/ bronchus intermedius bronchial stenosis  We have attempted several therapies in the past including VEST and saline nebulizers but she did not like either so discontinued them  Continue breztri BID and albuterol mdi/nebs prn. Advised she reconsider saline nebs should she develop phlegm. Does not want VEST  1/2024 Recommend to use Duonebs QID with saline nebs BID  followed by eduard valve  Prefers to not use VEST tx  Recommend to treat with Abx and short course of steroids and followup in 2 weeks     #3. COPD/asthma  Never smoker but history of sarcoidosis s/p ALEXI/BI stenting  Continue inhalers - breztri BID and albuterol mdi/neb prn  Completed pulm rehab at [facility] in 2019; advised to consider reenrolling   1/2024 CCT including Breztri BID, EDDA if not using Duonebs     #4. Bronchial stenosis  Complication from sarcoidosis with placement of ALEXI/BI metallic stent at [facility] in 1996  I have had multiple conversations with the patient over the years regarding the chronic stent and the stenosis. I suspect the stent is endothelialized and would be quite difficult to consider removal and because of this, the stenosis will likely be irreversible. She is aware the stenosis could be easily occluded and again advise she use her inhalers, nebs and airway clearance therapies to maintain patency. We have also reviewed bronchoscopy to evaluate the airway but is clear she does NOT want to consider any invasive procedures and is aware the stenosis could worsen. She prefers to approach her therapy with goal to maintain her breathing as comfortable with no procedures  1/2024 Continue to deny need for further procedures     #5. Chronic hypoxic respiratory failure  Reports having oxygen provided by her PCP. Reported RA at rest, 2L on exertion  1/2024 Recommend to check oxygen level at home and  may raise oxygen by 1 liter if below 88% until able to get her in for 6MWT after Abx and steroids       Shahida Sneed, MICKY, 1/4/2024, 11:57 AM

## 2024-01-18 NOTE — TELEPHONE ENCOUNTER
Attempted to call x 2 today for followup phone visit; no answer. LMTCB. Scheduling will call tomorrow and reschedule.

## 2024-01-24 NOTE — PROGRESS NOTES
NURSING ADMISSION NOTE      Patient admitted via Cart  Oriented to room. Safety precautions initiated. Bed in low position. Call light in reach. [No Acute Distress] : no acute distress [Normal Oropharynx] : normal oropharynx [II] : Mallampati Class: II [No Neck Mass] : no neck mass [No Abnormalities] : no abnormalities [Benign] : benign [Normal Gait] : normal gait [No Cyanosis] : no cyanosis [Normal Color/ Pigmentation] : normal color/ pigmentation [No Focal Deficits] : no focal deficits [Oriented x3] : oriented x3 [TextBox_54] : LOUD P2 [TextBox_68] : Decreased entry at bases

## 2024-02-01 ENCOUNTER — NURSE ONLY (OUTPATIENT)
Facility: CLINIC | Age: 79
End: 2024-02-01
Payer: COMMERCIAL

## 2024-02-01 VITALS — WEIGHT: 195 LBS | HEIGHT: 64 IN | BODY MASS INDEX: 33.29 KG/M2

## 2024-02-01 DIAGNOSIS — R09.02 HYPOXEMIA: Primary | ICD-10-CM

## 2024-02-01 PROCEDURE — 3008F BODY MASS INDEX DOCD: CPT | Performed by: NURSE PRACTITIONER

## 2024-02-01 PROCEDURE — 94618 PULMONARY STRESS TESTING: CPT | Performed by: NURSE PRACTITIONER

## 2024-02-01 NOTE — PROGRESS NOTES
OXYGEN CLINIC ASSESSMENT    Date: 2024 Physician: ESTER Xiao   Diagnosis: COPD Technician: RT Moises     Patient: Lisbet Velarde MRN: [id number] : 1945     Height: 5' 4\" (1.626 m) Weight: 195 lb Age: 79 year old         BP HR SpO2 RR RENETTA DIST  (FT) TIME Reason Stopped   RA         REST 130/60   74   98     20     0     N/A         N/A         N/A           RA       PEAK  EXERCISE 134/64 90 94   24   3   500   6 min   Study Ended                FINDINGS  0 LPM required to maintain a saturation of 98 % at rest   0 LPM required to maintain a saturation of 94 % with exertion     LPM Pulse dose to maintain a saturation of   % with exertion   Patient does not need supplemental oxygen at rest or for exertion.   Patient stopped a few times due to hip pain.  O2 saturation lowest reading 94%

## 2024-04-27 ENCOUNTER — APPOINTMENT (OUTPATIENT)
Dept: GENERAL RADIOLOGY | Facility: HOSPITAL | Age: 79
End: 2024-04-27
Attending: EMERGENCY MEDICINE
Payer: MEDICARE

## 2024-04-27 ENCOUNTER — HOSPITAL ENCOUNTER (INPATIENT)
Facility: HOSPITAL | Age: 79
LOS: 2 days | Discharge: HOME | End: 2024-04-29
Attending: EMERGENCY MEDICINE | Admitting: HOSPITALIST
Payer: MEDICARE

## 2024-04-27 DIAGNOSIS — E87.6 HYPOKALEMIA: ICD-10-CM

## 2024-04-27 DIAGNOSIS — K52.9 GASTROENTERITIS: Primary | ICD-10-CM

## 2024-04-27 PROBLEM — D72.829 LEUKOCYTOSIS: Status: ACTIVE | Noted: 2024-04-27

## 2024-04-27 LAB
ADENOVIRUS F 40/41 PCR: NEGATIVE
ADENOVIRUS PCR:: NOT DETECTED
ALBUMIN SERPL-MCNC: 3.8 G/DL (ref 3.4–5)
ALBUMIN/GLOB SERPL: 0.8 {RATIO} (ref 1–2)
ALP LIVER SERPL-CCNC: 120 U/L
ALT SERPL-CCNC: 12 U/L
ANION GAP SERPL CALC-SCNC: 11 MMOL/L (ref 0–18)
AST SERPL-CCNC: 17 U/L (ref 15–37)
ASTROVIRUS PCR: NEGATIVE
B PARAPERT DNA SPEC QL NAA+PROBE: NOT DETECTED
B PERT DNA SPEC QL NAA+PROBE: NOT DETECTED
BASOPHILS # BLD AUTO: 0.03 X10(3) UL (ref 0–0.2)
BASOPHILS NFR BLD AUTO: 0.2 %
BILIRUB SERPL-MCNC: 0.8 MG/DL (ref 0.1–2)
BUN BLD-MCNC: 15 MG/DL (ref 9–23)
C CAYETANENSIS DNA SPEC QL NAA+PROBE: NEGATIVE
C DIFF TOX B STL QL: NEGATIVE
C PNEUM DNA SPEC QL NAA+PROBE: NOT DETECTED
CALCIUM BLD-MCNC: 9.7 MG/DL (ref 8.5–10.1)
CAMPY SP DNA.DIARRHEA STL QL NAA+PROBE: NEGATIVE
CHLORIDE SERPL-SCNC: 103 MMOL/L (ref 98–112)
CO2 SERPL-SCNC: 27 MMOL/L (ref 21–32)
CORONAVIRUS 229E PCR:: NOT DETECTED
CORONAVIRUS HKU1 PCR:: NOT DETECTED
CORONAVIRUS NL63 PCR:: NOT DETECTED
CORONAVIRUS OC43 PCR:: NOT DETECTED
CREAT BLD-MCNC: 1.13 MG/DL
CRYPTOSP DNA SPEC QL NAA+PROBE: NEGATIVE
EAEC PAA PLAS AGGR+AATA ST NAA+NON-PRB: NEGATIVE
EC STX1+STX2 + H7 FLIC SPEC NAA+PROBE: NEGATIVE
EGFRCR SERPLBLD CKD-EPI 2021: 50 ML/MIN/1.73M2 (ref 60–?)
ENTAMOEBA HISTOLYTICA PCR: NEGATIVE
EOSINOPHIL # BLD AUTO: 0.01 X10(3) UL (ref 0–0.7)
EOSINOPHIL NFR BLD AUTO: 0.1 %
EPEC EAE GENE STL QL NAA+NON-PROBE: NEGATIVE
ERYTHROCYTE [DISTWIDTH] IN BLOOD BY AUTOMATED COUNT: 12.2 %
EST. AVERAGE GLUCOSE BLD GHB EST-MCNC: 140 MG/DL (ref 68–126)
ETEC LTA+ST1A+ST1B TOX ST NAA+NON-PROBE: NEGATIVE
FLUAV + FLUBV RNA SPEC NAA+PROBE: NEGATIVE
FLUAV + FLUBV RNA SPEC NAA+PROBE: NEGATIVE
FLUAV RNA SPEC QL NAA+PROBE: NOT DETECTED
FLUBV RNA SPEC QL NAA+PROBE: NOT DETECTED
GIARDIA LAMBLIA PCR: NEGATIVE
GLOBULIN PLAS-MCNC: 5 G/DL (ref 2.8–4.4)
GLUCOSE BLD-MCNC: 119 MG/DL (ref 70–99)
GLUCOSE BLD-MCNC: 145 MG/DL (ref 70–99)
GLUCOSE BLD-MCNC: 216 MG/DL (ref 70–99)
HBA1C MFR BLD: 6.5 % (ref ?–5.7)
HCT VFR BLD AUTO: 36.5 %
HGB BLD-MCNC: 11.5 G/DL
IMM GRANULOCYTES # BLD AUTO: 0.08 X10(3) UL (ref 0–1)
IMM GRANULOCYTES NFR BLD: 0.5 %
LIPASE SERPL-CCNC: 14 U/L (ref ?–300)
LYMPHOCYTES # BLD AUTO: 3.08 X10(3) UL (ref 1–4)
LYMPHOCYTES NFR BLD AUTO: 18.1 %
MCH RBC QN AUTO: 27 PG (ref 26–34)
MCHC RBC AUTO-ENTMCNC: 31.5 G/DL (ref 31–37)
MCV RBC AUTO: 85.7 FL
METAPNEUMOVIRUS PCR:: NOT DETECTED
MONOCYTES # BLD AUTO: 0.63 X10(3) UL (ref 0.1–1)
MONOCYTES NFR BLD AUTO: 3.7 %
MYCOPLASMA PNEUMONIA PCR:: NOT DETECTED
NEUTROPHILS # BLD AUTO: 13.2 X10 (3) UL (ref 1.5–7.7)
NEUTROPHILS # BLD AUTO: 13.2 X10(3) UL (ref 1.5–7.7)
NEUTROPHILS NFR BLD AUTO: 77.4 %
NOROVIRUS GI/GII PCR: POSITIVE
OSMOLALITY SERPL CALC.SUM OF ELEC: 299 MOSM/KG (ref 275–295)
P SHIGELLOIDES DNA STL QL NAA+PROBE: NEGATIVE
PARAINFLUENZA 1 PCR:: NOT DETECTED
PARAINFLUENZA 2 PCR:: NOT DETECTED
PARAINFLUENZA 3 PCR:: NOT DETECTED
PARAINFLUENZA 4 PCR:: NOT DETECTED
PLATELET # BLD AUTO: 332 10(3)UL (ref 150–450)
POTASSIUM SERPL-SCNC: 2.9 MMOL/L (ref 3.5–5.1)
POTASSIUM SERPL-SCNC: 3.9 MMOL/L (ref 3.5–5.1)
PROT SERPL-MCNC: 8.8 G/DL (ref 6.4–8.2)
RBC # BLD AUTO: 4.26 X10(6)UL
RHINOVIRUS/ENTERO PCR:: NOT DETECTED
ROTAVIRUS A PCR: NEGATIVE
RSV RNA SPEC NAA+PROBE: NEGATIVE
RSV RNA SPEC QL NAA+PROBE: NOT DETECTED
SALMONELLA DNA SPEC QL NAA+PROBE: NEGATIVE
SAPOVIRUS PCR: NEGATIVE
SARS-COV-2 RNA NPH QL NAA+NON-PROBE: NOT DETECTED
SARS-COV-2 RNA RESP QL NAA+PROBE: NOT DETECTED
SHIGELLA SP+EIEC IPAH ST NAA+NON-PROBE: NEGATIVE
SODIUM SERPL-SCNC: 141 MMOL/L (ref 136–145)
V CHOLERAE DNA SPEC QL NAA+PROBE: NEGATIVE
VIBRIO DNA SPEC NAA+PROBE: NEGATIVE
WBC # BLD AUTO: 17 X10(3) UL (ref 4–11)
YERSINIA DNA SPEC NAA+PROBE: NEGATIVE

## 2024-04-27 PROCEDURE — 99223 1ST HOSP IP/OBS HIGH 75: CPT | Performed by: STUDENT IN AN ORGANIZED HEALTH CARE EDUCATION/TRAINING PROGRAM

## 2024-04-27 PROCEDURE — 71045 X-RAY EXAM CHEST 1 VIEW: CPT | Performed by: EMERGENCY MEDICINE

## 2024-04-27 RX ORDER — ATORVASTATIN CALCIUM 10 MG/1
10 TABLET, FILM COATED ORAL NIGHTLY
Status: DISCONTINUED | OUTPATIENT
Start: 2024-04-27 | End: 2024-04-29

## 2024-04-27 RX ORDER — MELATONIN
3 NIGHTLY PRN
Status: DISCONTINUED | OUTPATIENT
Start: 2024-04-27 | End: 2024-04-29

## 2024-04-27 RX ORDER — METOCLOPRAMIDE HYDROCHLORIDE 5 MG/ML
5 INJECTION INTRAMUSCULAR; INTRAVENOUS EVERY 8 HOURS PRN
Status: DISCONTINUED | OUTPATIENT
Start: 2024-04-27 | End: 2024-04-29

## 2024-04-27 RX ORDER — ALBUTEROL SULFATE 90 UG/1
2 AEROSOL, METERED RESPIRATORY (INHALATION) EVERY 4 HOURS PRN
Status: DISCONTINUED | OUTPATIENT
Start: 2024-04-27 | End: 2024-04-29

## 2024-04-27 RX ORDER — ONDANSETRON 2 MG/ML
4 INJECTION INTRAMUSCULAR; INTRAVENOUS ONCE
Status: COMPLETED | OUTPATIENT
Start: 2024-04-27 | End: 2024-04-27

## 2024-04-27 RX ORDER — NICOTINE POLACRILEX 4 MG
30 LOZENGE BUCCAL
Status: DISCONTINUED | OUTPATIENT
Start: 2024-04-27 | End: 2024-04-29

## 2024-04-27 RX ORDER — POLYETHYLENE GLYCOL 3350 17 G/17G
17 POWDER, FOR SOLUTION ORAL DAILY PRN
Status: DISCONTINUED | OUTPATIENT
Start: 2024-04-27 | End: 2024-04-29

## 2024-04-27 RX ORDER — BENZONATATE 200 MG/1
200 CAPSULE ORAL 3 TIMES DAILY PRN
Status: DISCONTINUED | OUTPATIENT
Start: 2024-04-27 | End: 2024-04-29

## 2024-04-27 RX ORDER — DICYCLOMINE HYDROCHLORIDE 10 MG/ML
20 INJECTION INTRAMUSCULAR ONCE
Status: COMPLETED | OUTPATIENT
Start: 2024-04-27 | End: 2024-04-27

## 2024-04-27 RX ORDER — POTASSIUM CHLORIDE 20 MEQ/1
40 TABLET, EXTENDED RELEASE ORAL ONCE
Status: DISCONTINUED | OUTPATIENT
Start: 2024-04-27 | End: 2024-04-29

## 2024-04-27 RX ORDER — MAGNESIUM HYDROXIDE/ALUMINUM HYDROXICE/SIMETHICONE 120; 1200; 1200 MG/30ML; MG/30ML; MG/30ML
30 SUSPENSION ORAL 4 TIMES DAILY PRN
Status: DISCONTINUED | OUTPATIENT
Start: 2024-04-27 | End: 2024-04-29

## 2024-04-27 RX ORDER — MORPHINE SULFATE 4 MG/ML
4 INJECTION, SOLUTION INTRAMUSCULAR; INTRAVENOUS ONCE
Status: COMPLETED | OUTPATIENT
Start: 2024-04-27 | End: 2024-04-27

## 2024-04-27 RX ORDER — PANTOPRAZOLE SODIUM 40 MG/1
40 TABLET, DELAYED RELEASE ORAL
Status: DISCONTINUED | OUTPATIENT
Start: 2024-04-27 | End: 2024-04-27

## 2024-04-27 RX ORDER — ONDANSETRON 2 MG/ML
4 INJECTION INTRAMUSCULAR; INTRAVENOUS EVERY 6 HOURS PRN
Status: DISCONTINUED | OUTPATIENT
Start: 2024-04-27 | End: 2024-04-29

## 2024-04-27 RX ORDER — SODIUM CHLORIDE 9 MG/ML
INJECTION, SOLUTION INTRAVENOUS CONTINUOUS
Status: DISCONTINUED | OUTPATIENT
Start: 2024-04-27 | End: 2024-04-28

## 2024-04-27 RX ORDER — PANTOPRAZOLE SODIUM 40 MG/1
40 TABLET, DELAYED RELEASE ORAL DAILY
Status: DISCONTINUED | OUTPATIENT
Start: 2024-04-27 | End: 2024-04-29

## 2024-04-27 RX ORDER — ECHINACEA PURPUREA EXTRACT 125 MG
1 TABLET ORAL
Status: DISCONTINUED | OUTPATIENT
Start: 2024-04-27 | End: 2024-04-29

## 2024-04-27 RX ORDER — DEXTROSE MONOHYDRATE 25 G/50ML
50 INJECTION, SOLUTION INTRAVENOUS
Status: DISCONTINUED | OUTPATIENT
Start: 2024-04-27 | End: 2024-04-29

## 2024-04-27 RX ORDER — BISACODYL 10 MG
10 SUPPOSITORY, RECTAL RECTAL
Status: DISCONTINUED | OUTPATIENT
Start: 2024-04-27 | End: 2024-04-29

## 2024-04-27 RX ORDER — CALCIUM CARBONATE 500 MG/1
1000 TABLET, CHEWABLE ORAL 3 TIMES DAILY PRN
Status: DISCONTINUED | OUTPATIENT
Start: 2024-04-27 | End: 2024-04-29

## 2024-04-27 RX ORDER — ENEMA 19; 7 G/133ML; G/133ML
1 ENEMA RECTAL ONCE AS NEEDED
Status: DISCONTINUED | OUTPATIENT
Start: 2024-04-27 | End: 2024-04-29

## 2024-04-27 RX ORDER — MORPHINE SULFATE 2 MG/ML
1 INJECTION, SOLUTION INTRAMUSCULAR; INTRAVENOUS EVERY 4 HOURS PRN
Status: DISCONTINUED | OUTPATIENT
Start: 2024-04-27 | End: 2024-04-29

## 2024-04-27 RX ORDER — SENNOSIDES 8.6 MG
17.2 TABLET ORAL NIGHTLY PRN
Status: DISCONTINUED | OUTPATIENT
Start: 2024-04-27 | End: 2024-04-29

## 2024-04-27 RX ORDER — SODIUM CHLORIDE 9 MG/ML
INJECTION, SOLUTION INTRAVENOUS CONTINUOUS
Status: ACTIVE | OUTPATIENT
Start: 2024-04-27 | End: 2024-04-27

## 2024-04-27 RX ORDER — ENOXAPARIN SODIUM 100 MG/ML
40 INJECTION SUBCUTANEOUS DAILY
Status: DISCONTINUED | OUTPATIENT
Start: 2024-04-27 | End: 2024-04-29

## 2024-04-27 RX ORDER — ONDANSETRON 2 MG/ML
4 INJECTION INTRAMUSCULAR; INTRAVENOUS EVERY 4 HOURS PRN
Status: DISCONTINUED | OUTPATIENT
Start: 2024-04-27 | End: 2024-04-27

## 2024-04-27 RX ORDER — FLUTICASONE FUROATE AND VILANTEROL 100; 25 UG/1; UG/1
1 POWDER RESPIRATORY (INHALATION) DAILY
Status: DISCONTINUED | OUTPATIENT
Start: 2024-04-27 | End: 2024-04-29

## 2024-04-27 RX ORDER — ACETAMINOPHEN 500 MG
1000 TABLET ORAL EVERY 8 HOURS PRN
Status: DISCONTINUED | OUTPATIENT
Start: 2024-04-27 | End: 2024-04-29

## 2024-04-27 RX ORDER — MORPHINE SULFATE 2 MG/ML
2 INJECTION, SOLUTION INTRAMUSCULAR; INTRAVENOUS ONCE
Status: COMPLETED | OUTPATIENT
Start: 2024-04-27 | End: 2024-04-27

## 2024-04-27 RX ORDER — AMLODIPINE BESYLATE 10 MG/1
10 TABLET ORAL DAILY
Status: DISCONTINUED | OUTPATIENT
Start: 2024-04-27 | End: 2024-04-29

## 2024-04-27 RX ORDER — MORPHINE SULFATE 2 MG/ML
0.5 INJECTION, SOLUTION INTRAMUSCULAR; INTRAVENOUS EVERY 4 HOURS PRN
Status: DISCONTINUED | OUTPATIENT
Start: 2024-04-27 | End: 2024-04-27

## 2024-04-27 RX ORDER — HYDROCODONE BITARTRATE AND ACETAMINOPHEN 10; 325 MG/1; MG/1
1 TABLET ORAL EVERY 6 HOURS PRN
Status: DISCONTINUED | OUTPATIENT
Start: 2024-04-27 | End: 2024-04-29

## 2024-04-27 RX ORDER — IPRATROPIUM BROMIDE AND ALBUTEROL SULFATE 2.5; .5 MG/3ML; MG/3ML
3 SOLUTION RESPIRATORY (INHALATION) 4 TIMES DAILY PRN
Status: DISCONTINUED | OUTPATIENT
Start: 2024-04-27 | End: 2024-04-29

## 2024-04-27 RX ORDER — MORPHINE SULFATE 4 MG/ML
4 INJECTION, SOLUTION INTRAMUSCULAR; INTRAVENOUS ONCE
Status: DISCONTINUED | OUTPATIENT
Start: 2024-04-27 | End: 2024-04-27

## 2024-04-27 RX ORDER — NICOTINE POLACRILEX 4 MG
15 LOZENGE BUCCAL
Status: DISCONTINUED | OUTPATIENT
Start: 2024-04-27 | End: 2024-04-29

## 2024-04-27 RX ORDER — SODIUM CHLORIDE 9 MG/ML
INJECTION, SOLUTION INTRAVENOUS CONTINUOUS
Status: DISCONTINUED | OUTPATIENT
Start: 2024-04-27 | End: 2024-04-27

## 2024-04-27 RX ORDER — HYDRALAZINE HYDROCHLORIDE 20 MG/ML
5 INJECTION INTRAMUSCULAR; INTRAVENOUS EVERY 6 HOURS PRN
Status: DISCONTINUED | OUTPATIENT
Start: 2024-04-27 | End: 2024-04-29

## 2024-04-27 RX ORDER — KETOROLAC TROMETHAMINE 15 MG/ML
15 INJECTION, SOLUTION INTRAMUSCULAR; INTRAVENOUS EVERY 6 HOURS PRN
Status: ACTIVE | OUTPATIENT
Start: 2024-04-27 | End: 2024-04-29

## 2024-04-27 NOTE — ED QUICK NOTES
Patient on call light, asking for assistance to use the bathroom. Patient taken to bathroom by this RN using wheelchair.

## 2024-04-27 NOTE — ED PROVIDER NOTES
Patient Seen in: [facility] Emergency Department      History     Chief Complaint   Patient presents with    Nausea/vomiting    Cough/URI     Stated Complaint: pt states vomiting and coughing all night    Subjective:   HPI    79-year-old female complaining vomiting.  The patient describes that she has sarcoidosis and had a right mainstem bronchus stenting 1996 that is not being treated.  States she yesterday morning started vomiting has had coughing several episodes of vomiting and diarrhea throughout the night.  She does feel lightheaded and weak when she stands at times.  She did have Keflex about a month ago.  For respiratory infection.  She has chronic pain and takes 10 mg hydrocodone 3-4 times a day as not taking it during the day yesterday because she could not hold it down.  No fever.    Objective:   Past Medical History:    Arthritis    Bronchial obstruction s/p right mainstem stent    Congestive heart disease (HCC)    COPD (chronic obstructive pulmonary disease) (HCC)    Diabetes mellitus (HCC)    Esophageal reflux    Hearing loss    HTN (hypertension)    Hyperlipidemia    Osteoarthritis    Sarcoidosis              Past Surgical History:   Procedure Laterality Date    Appendectomy      Hysterectomy      Joaquin biopsy stereo nodule 2 site bilat (cpt=19081/26155)      2015    Removal of lung,lobectomy      Tonsillectomy      Total abdom hysterectomy                  Social History     Socioeconomic History    Marital status:    Tobacco Use    Smoking status: Never    Smokeless tobacco: Never   Substance and Sexual Activity    Alcohol use: Never    Drug use: Never              Review of Systems    Positive for stated complaint: pt states vomiting and coughing all night  Other systems are as noted in HPI.  Constitutional and vital signs reviewed.      All other systems reviewed and negative except as noted above.    Physical Exam     ED Triage Vitals   BP 04/27/24 0725 (!) 187/75   Pulse 04/27/24  0725 95   Resp 04/27/24 0725 20   Temp 04/27/24 0728 97.3 °F (36.3 °C)   Temp src 04/27/24 0728 Temporal   SpO2 04/27/24 0725 98 %   O2 Device 04/27/24 0725 None (Room air)       Current:BP (!) 187/75   Pulse 95   Temp 97.3 °F (36.3 °C) (Temporal)   Resp 20   Ht 162.6 cm (5' 4\")   Wt 85.7 kg   SpO2 98%   BMI 32.44 kg/m²         Physical Exam    Patient is alert orient x 3 no acute distress HEENT exam within normal limits neck there is no lymphadenopathy or JVD lungs rhonchi on the right side clear on the left cardiovascular exam shows regular rate and rhythm without murmurs abdomen soft and nontender extremities normal skin is no rash.    ED Course     Labs Reviewed   COMP METABOLIC PANEL (14) - Abnormal; Notable for the following components:       Result Value    Glucose 216 (*)     Potassium 2.9 (*)     Creatinine 1.13 (*)     Calculated Osmolality 299 (*)     eGFR-Cr 50 (*)     ALT 12 (*)     Total Protein 8.8 (*)     Globulin  5.0 (*)     A/G Ratio 0.8 (*)     All other components within normal limits   CBC W/ DIFFERENTIAL - Abnormal; Notable for the following components:    WBC 17.0 (*)     HGB 11.5 (*)     Neutrophil Absolute Prelim 13.20 (*)     Neutrophil Absolute 13.20 (*)     All other components within normal limits   C. DIFFICILE(TOXIGENIC)PCR - Normal   CBC WITH DIFFERENTIAL WITH PLATELET    Narrative:     The following orders were created for panel order CBC With Differential With Platelet.  Procedure                               Abnormality         Status                     ---------                               -----------         ------                     CBC W/ DIFFERENTIAL[[id number]]          Abnormal            Final result                 Please view results for these tests on the individual orders.   URINALYSIS WITH CULTURE REFLEX   RAINBOW DRAW LAVENDER   RAINBOW DRAW LIGHT GREEN   RAINBOW DRAW BLUE   RAINBOW DRAW GOLD   SARS-COV-2/FLU A AND B/RSV BY PCR (GENEXPERT)   GI STOOL PANEL  BY PCR             Patient's white blood cell count was 17 potassium 2.9  XR CHEST AP PORTABLE  (CPT=71045)    Result Date: 4/27/2024  CONCLUSION:  Stable small right pleural effusion and right lower lobe atelectasis versus consolidation with volume loss resulting in rightward mediastinal shift.  Superimposed infection/inflammation is not excluded.   LOCATION:  [facility]      Dictated by (CST): Arnulfo Stock MD on 4/27/2024 at 8:17 AM     Finalized by (CST): Arnulfo Stock MD on 4/27/2024 at 8:19 AM      Images independently reviewed there is a stable small right pleural effusion and atelectasis.  Images independent reviewed.  Patient had some gastroenteritis probable         MDM      Initial differential diagnosis considered not limited to includes viral or bacterial gastroenteritis C. difficile colitis pneumonia aspiration sepsis dehydration  Admission disposition: 4/27/2024  9:45 AM         Viral etiology and has hypokalemia and leukocytosis and appears somewhat weak needs to come in for IV fluids antiemetics and further observation.  Correction of her potassium.                               Medical Decision Making      Disposition and Plan     Clinical Impression:  1. Gastroenteritis    2. Hypokalemia         Disposition:  Admit  4/27/2024  9:45 am    Follow-up:  No follow-up provider specified.        Medications Prescribed:  Current Discharge Medication List                            Hospital Problems       Present on Admission  Date Reviewed: 1/18/2024            ICD-10-CM Noted POA    * (Principal) Gastroenteritis K52.9 4/10/2023 Unknown    Leukocytosis D72.829 4/27/2024 Yes                    Statement Selected

## 2024-04-27 NOTE — H&P
[facility]     Lisbet Velarde Patient Status:  Emergency    1945 MRN [id number]   Location [facility] EMERGENCY DEPARTMENT Attending Maverick Conner MD   Hosp Day # 0 PCP Mumtaz Malone MD     Chief Complaint: Nausea     Subjective:    History of Present Illness:     Lisbet Velarde is a 79 year old female with  h/o CHF, chronic bronchia obstruction w stent, known Lung mass , Hypertension, DM,HLD. Patient is presenting one day of abdoinal pain, nausea ongoing emesis not tolerating PO. She has had loose stools. Was on abx 1-2 months ago. She had a tmax of 99 yesterday and some chills.    History/Other:    Past Medical History:  Past Medical History:    Arthritis    Bronchial obstruction s/p right mainstem stent    Congestive heart disease (HCC)    COPD (chronic obstructive pulmonary disease) (HCC)    Diabetes mellitus (HCC)    Esophageal reflux    Hearing loss    HTN (hypertension)    Hyperlipidemia    Osteoarthritis    Sarcoidosis     Past Surgical History:   Past Surgical History:   Procedure Laterality Date    Appendectomy      Hysterectomy      Joaquin biopsy stereo nodule 2 site bilat (cpt=19081/02058)          Removal of lung,lobectomy      Tonsillectomy      Total abdom hysterectomy        Family History:   Family History   Problem Relation Age of Onset    Ovarian Cancer Mother 76    Uterine Cancer Mother     Stroke Mother     Breast Cancer Sister 60    Breast Cancer Sister 40    Stroke Father     Mental Disorder Father     Prostate Cancer Brother      Social History:    reports that she has never smoked. She has never used smokeless tobacco. She reports that she does not drink alcohol and does not use drugs.     Allergies:   Allergies   Allergen Reactions    Augmentin [Amoxicillin-Pot Clavulanate] ANGIOEDEMA     Per patient, tolerates cefdinir    Vancomycin TONGUE SWELLING    Doxycycline NAUSEA AND VOMITING    Levaquin [Levofloxacin] DIZZINESS and OTHER  (SEE COMMENTS)     Hot, flushed.       Medications:    No current facility-administered medications on file prior to encounter.     Current Outpatient Medications on File Prior to Encounter   Medication Sig Dispense Refill    azithromycin 250 MG Oral Tab take 2 tablet (500MG)  by ORAL route  every day for 1 day then 1 tablet (250 mg) by oral route once daily for 4 days 6 tablet 0    predniSONE 10 MG Oral Tab Take 3 tabs (30mg) daily for 3 days, then take 2 tabs (20mg) daily for 3 days, then take 1 tab (10mg) daily for 3 days. 18 tablet 0    methocarbamol 500 MG Oral Tab Take 1 tablet (500 mg total) by mouth 3 (three) times daily.      tiZANidine 2 MG Oral Tab 1 tablet as needed Orally Three times a day for 30 days      ipratropium-albuterol 0.5-2.5 (3) MG/3ML Inhalation Solution Take 3 mL by nebulization 4 (four) times daily as needed.      amLODIPine 10 MG Oral Tab Take 1 tablet (10 mg total) by mouth daily. 30 tablet 1    aspirin 325 MG Oral Tab Take 1 tablet (325 mg total) by mouth daily. (Patient not taking: Reported on 6/29/2023)  0    benzonatate 200 MG Oral Cap TAKE ONE CAPSULE BY MOUTH TWICE DAILY for 30      hydroCHLOROthiazide 12.5 MG Oral Tab Take 1 tablet (12.5 mg total) by mouth daily.      pantoprazole 40 MG Oral Tab EC 1 tablet (40 mg total) daily.      HYDROcodone-acetaminophen  MG Oral Tab Take 1 tablet by mouth every 6 (six) hours as needed. 21 tablet 0    metoprolol tartrate 25 MG Oral Tab Take 1 tablet (25 mg total) by mouth 2x Daily(Beta Blocker). 60 tablet 0    docusate sodium 100 MG Oral Cap Take 100 mg by mouth 2 (two) times daily. 60 capsule 3    PEG 3350 17 g Oral Powd Pack Take 17 g by mouth daily as needed. 30 each 3    metFORMIN HCl  MG Oral Tablet 24 Hr Take 1 tablet (500 mg total) by mouth daily with dinner. (evening meal)      Budesonide-Formoterol Fumarate 160-4.5 MCG/ACT Inhalation Aerosol Inhale 2 puffs into the lungs daily.      Multiple Vitamin (TAB-A-BELLA) Oral Tab  Take 1 tablet by mouth daily.      atorvastatin 10 MG Oral Tab Take 1 tablet (10 mg total) by mouth nightly.      Losartan Potassium-HCTZ 100-25 MG Oral Tab Take 1 tablet by mouth daily.      Albuterol Sulfate  (90 Base) MCG/ACT Inhalation Aero Soln Inhale 2 puffs into the lungs every 4 (four) hours as needed for Wheezing or Shortness of Breath. CARRY YOUR INHALER WITH YOU. 1 Inhaler 0       Review of Systems:   A comprehensive review of systems was completed.    Pertinent positives and negatives noted in the HPI.    Objective:   Physical Exam:    BP (!) 187/75   Pulse 95   Temp 97.3 °F (36.3 °C) (Temporal)   Resp 20   Ht 5' 4\" (1.626 m)   Wt 189 lb (85.7 kg)   SpO2 98%   BMI 32.44 kg/m²   General: No acute distress, Alert  Respiratory: No rhonchi, no wheezes  Cardiovascular: S1, S2. Regular rate and rhythm  Abdomen: Soft, Non-tender, non-distended, positive bowel sounds  Neuro: No new focal deficits  Extremities: No edema      Results:    Labs:      Labs Last 24 Hours:    Recent Labs   Lab 04/27/24  0751   RBC 4.26   HGB 11.5*   HCT 36.5   MCV 85.7   MCH 27.0   MCHC 31.5   RDW 12.2   NEPRELIM 13.20*   WBC 17.0*   .0       Recent Labs   Lab 04/27/24  0751   *   BUN 15   CREATSERUM 1.13*   EGFRCR 50*   CA 9.7   ALB 3.8      K 2.9*      CO2 27.0   ALKPHO 120   AST 17   ALT 12*   BILT 0.8   TP 8.8*       Lab Results   Component Value Date    INR 0.96 09/26/2020    INR 0.93 05/01/2018       No results for input(s): \"TROP\", \"TROPHS\", \"CK\" in the last 168 hours.    No results for input(s): \"TROP\", \"PBNP\" in the last 168 hours.    No results for input(s): \"PCT\" in the last 168 hours.    Imaging: Imaging data reviewed in Epic.    Assessment & Plan:      #Abdominal pain, N v  Lipase, LFT normal  Supportive Care  Stool studies  If no improvement in AM, will scan abdomen  #urine retention  Pt reports some hesitancy at home  Bladder scan   UA  #Leucocytosis likely due to above  CBc in  AM  #HypoK  Due to diarrhea   #DM type 2  ISS  #chronic pain  #Hypertension  Hold ARB due to mild renal insufficiency, continue BB, CCB  PRN   #HfPEF no acute exacerbation    Plan of care discussed with pt     Isela Higginbotham MD    Supplementary Documentation:     The 21st Century Cures Act makes medical notes like these available to patients in the interest of transparency. Please be advised this is a medical document. Medical documents are intended to carry relevant information, facts as evident, and the clinical opinion of the practitioner. The medical note is intended as peer to peer communication and may appear blunt or direct. It is written in medical language and may contain abbreviations or verbiage that are unfamiliar.

## 2024-04-27 NOTE — ED QUICK NOTES
Orders for admission, patient is aware of plan and ready to go upstairs. Any questions, please call ED RN Alvin at extension [phone].     Patient Covid vaccination status: Fully vaccinated     COVID Test Ordered in ED: SARS-CoV-2/Flu A and B/RSV by PCR ([facility])    COVID Suspicion at Admission: N/A    Running Infusions:    sodium chloride 125 mL/hr at 04/27/24 0905        Mental Status/LOC at time of transport: A&Ox4    Other pertinent information:   CIWA score: N/A   NIH score:  N/A

## 2024-04-27 NOTE — ED INITIAL ASSESSMENT (HPI)
To the ED via walk-in in wheelchair with c/o fever, coughing, diarrhea, vomiting since yesterday morning.

## 2024-04-27 NOTE — PLAN OF CARE
NURSING ADMISSION NOTE      Patient admitted via Cart  Oriented to room.  Safety precautions initiated.  Bed in low position.  Call light in reach.    Pt received alert and oriented x4, c/o cramping stomach pain. Dry heaving upon arrival, some frothy sputum noted in basin. BP elevated. Requesting 2L of oxygen for comfort. Navigator completed. All questions answered.

## 2024-04-28 LAB
ALBUMIN SERPL-MCNC: 3.3 G/DL (ref 3.4–5)
ALBUMIN/GLOB SERPL: 0.7 {RATIO} (ref 1–2)
ALP LIVER SERPL-CCNC: 99 U/L
ALT SERPL-CCNC: 11 U/L
ANION GAP SERPL CALC-SCNC: 3 MMOL/L (ref 0–18)
AST SERPL-CCNC: 17 U/L (ref 15–37)
BASOPHILS # BLD AUTO: 0.02 X10(3) UL (ref 0–0.2)
BASOPHILS NFR BLD AUTO: 0.2 %
BILIRUB SERPL-MCNC: 0.4 MG/DL (ref 0.1–2)
BUN BLD-MCNC: 12 MG/DL (ref 9–23)
CALCIUM BLD-MCNC: 9 MG/DL (ref 8.5–10.1)
CHLORIDE SERPL-SCNC: 107 MMOL/L (ref 98–112)
CO2 SERPL-SCNC: 29 MMOL/L (ref 21–32)
CREAT BLD-MCNC: 0.83 MG/DL
EGFRCR SERPLBLD CKD-EPI 2021: 72 ML/MIN/1.73M2 (ref 60–?)
EOSINOPHIL # BLD AUTO: 0.01 X10(3) UL (ref 0–0.7)
EOSINOPHIL NFR BLD AUTO: 0.1 %
ERYTHROCYTE [DISTWIDTH] IN BLOOD BY AUTOMATED COUNT: 12.8 %
GLOBULIN PLAS-MCNC: 4.7 G/DL (ref 2.8–4.4)
GLUCOSE BLD-MCNC: 110 MG/DL (ref 70–99)
GLUCOSE BLD-MCNC: 121 MG/DL (ref 70–99)
GLUCOSE BLD-MCNC: 124 MG/DL (ref 70–99)
GLUCOSE BLD-MCNC: 126 MG/DL (ref 70–99)
GLUCOSE BLD-MCNC: 138 MG/DL (ref 70–99)
HCT VFR BLD AUTO: 32.7 %
HGB BLD-MCNC: 10.4 G/DL
IMM GRANULOCYTES # BLD AUTO: 0.04 X10(3) UL (ref 0–1)
IMM GRANULOCYTES NFR BLD: 0.4 %
LYMPHOCYTES # BLD AUTO: 2.95 X10(3) UL (ref 1–4)
LYMPHOCYTES NFR BLD AUTO: 25.9 %
MCH RBC QN AUTO: 27.1 PG (ref 26–34)
MCHC RBC AUTO-ENTMCNC: 31.8 G/DL (ref 31–37)
MCV RBC AUTO: 85.2 FL
MONOCYTES # BLD AUTO: 0.93 X10(3) UL (ref 0.1–1)
MONOCYTES NFR BLD AUTO: 8.2 %
NEUTROPHILS # BLD AUTO: 7.45 X10 (3) UL (ref 1.5–7.7)
NEUTROPHILS # BLD AUTO: 7.45 X10(3) UL (ref 1.5–7.7)
NEUTROPHILS NFR BLD AUTO: 65.2 %
OSMOLALITY SERPL CALC.SUM OF ELEC: 289 MOSM/KG (ref 275–295)
PLATELET # BLD AUTO: 280 10(3)UL (ref 150–450)
POTASSIUM SERPL-SCNC: 3.6 MMOL/L (ref 3.5–5.1)
PROT SERPL-MCNC: 8 G/DL (ref 6.4–8.2)
RBC # BLD AUTO: 3.84 X10(6)UL
SODIUM SERPL-SCNC: 139 MMOL/L (ref 136–145)
WBC # BLD AUTO: 11.4 X10(3) UL (ref 4–11)

## 2024-04-28 PROCEDURE — 99233 SBSQ HOSP IP/OBS HIGH 50: CPT | Performed by: STUDENT IN AN ORGANIZED HEALTH CARE EDUCATION/TRAINING PROGRAM

## 2024-04-28 RX ORDER — HYDROCHLOROTHIAZIDE 12.5 MG/1
12.5 TABLET ORAL DAILY
Status: DISCONTINUED | OUTPATIENT
Start: 2024-04-28 | End: 2024-04-29

## 2024-04-28 RX ORDER — LOSARTAN POTASSIUM 100 MG/1
100 TABLET ORAL DAILY
Status: DISCONTINUED | OUTPATIENT
Start: 2024-04-28 | End: 2024-04-29

## 2024-04-28 RX ORDER — PREDNISONE 20 MG/1
40 TABLET ORAL
Status: DISCONTINUED | OUTPATIENT
Start: 2024-04-28 | End: 2024-04-29

## 2024-04-28 RX ORDER — IPRATROPIUM BROMIDE AND ALBUTEROL SULFATE 2.5; .5 MG/3ML; MG/3ML
3 SOLUTION RESPIRATORY (INHALATION) EVERY 6 HOURS
Status: DISCONTINUED | OUTPATIENT
Start: 2024-04-28 | End: 2024-04-29

## 2024-04-28 NOTE — PLAN OF CARE
Patient given dose of prn apresoline for bp of 184/74  Obtained good results and bp decreased to 165/72 at 1314   Also given dose of prn extra strenth tylenol for headache.

## 2024-04-28 NOTE — PROGRESS NOTES
[facility]   part of [facility]     Hospitalist Progress Note     Lisbet Velarde Patient Status:  Inpatient    1945 MRN [id number]   [facility] 4NW-A Attending Isela Higginbotham MD   Hosp Day # 1 PCP Mumtaz Malone MD     Chief Complaint: Nausea     Subjective:     Patient  having nausea.     Objective:    Review of Systems:   A comprehensive review of systems was completed; pertinent positive and negatives stated in subjective.    Vital signs:  Temp:  [98.8 °F (37.1 °C)-99.1 °F (37.3 °C)] 98.8 °F (37.1 °C)  Pulse:  [] 87  Resp:  [18-20] 18  BP: (161-174)/(61-72) 167/68  SpO2:  [93 %-99 %] 99 %    Physical Exam:    General: No acute distress  Respiratory: No wheezes, no rhonchi  Cardiovascular: S1, S2, regular rate and rhythm  Abdomen: Soft, Non-tender, non-distended, positive bowel sounds  Neuro: No new focal deficits.   Extremities: No edema      Diagnostic Data:    Labs:  Recent Labs   Lab 24  0751 24  0808   WBC 17.0* 11.4*   HGB 11.5* 10.4*   MCV 85.7 85.2   .0 280.0       Recent Labs   Lab 24  0751 24  1830 24  0808   *  --  121*   BUN 15  --  12   CREATSERUM 1.13*  --  0.83   CA 9.7  --  9.0   ALB 3.8  --  3.3*     --  139   K 2.9* 3.9 3.6     --  107   CO2 27.0  --  29.0   ALKPHO 120  --  99   AST 17  --  17   ALT 12*  --  11*   BILT 0.8  --  0.4   TP 8.8*  --  8.0       Estimated Creatinine Clearance: 48.2 mL/min (based on SCr of 0.83 mg/dL).    No results for input(s): \"TROP\", \"TROPHS\", \"CK\" in the last 168 hours.    No results for input(s): \"PTP\", \"INR\" in the last 168 hours.               Microbiology    No results found for this visit on 24.      Imaging: Reviewed in Epic.    Medications:    hydroCHLOROthiazide  12.5 mg Oral Daily    losartan  100 mg Oral Daily    ipratropium-albuterol  3 mL Nebulization q6h    predniSONE  40 mg Oral Daily with breakfast    potassium chloride  40 mEq Oral Once     enoxaparin  40 mg Subcutaneous Daily    amLODIPine  10 mg Oral Daily    atorvastatin  10 mg Oral Nightly    fluticasone furoate-vilanterol  1 puff Inhalation Daily    metoprolol tartrate  25 mg Oral 2x Daily(Beta Blocker)    pantoprazole  40 mg Oral Daily    insulin aspart  1-5 Units Subcutaneous TID AC and HS       Assessment & Plan:      #Abdominal pain, N v  Lipase, LFT normal  Supportive Care  Stool studies : norovirus   #urine retention  Pt reports some hesitancy at home  Bladder scan   #Leucocytosis likely due to above  CBc in AM  #HypoK  Due to diarrhea   #DM type 2  ISS  #chronic pain  #Hypertension  Hold ARB due to mild renal insufficiency, continue BB, CCB  PRN   #HfPEF no acute exacerbation  #COPD with acute exacerbation  PRednisone, NEBS      Isela Higginbotham MD    Supplementary Documentation:     Quality:  DVT Mechanical Prophylaxis:   SCDs,    DVT Pharmacologic Prophylaxis   Medication    enoxaparin (Lovenox) 40 MG/0.4ML SUBQ injection 40 mg                Code Status: Full Code  Cummings: No urinary catheter in place  Cummings Duration (in days):   Central line:    RADHA:     Discharge is dependent on: clinical   At this point Ms. Cristiano Velarde is expected to be discharge to: home       The 21st Century Cures Act makes medical notes like these available to patients in the interest of transparency. Please be advised this is a medical document. Medical documents are intended to carry relevant information, facts as evident, and the clinical opinion of the practitioner. The medical note is intended as peer to peer communication and may appear blunt or direct. It is written in medical language and may contain abbreviations or verbiage that are unfamiliar.             **Certification      PHYSICIAN Certification of Need for Inpatient Hospitalization -     Patient will require inpatient services that will reasonably be expected to span two midnight's based on the clinical documentation in H+P.   Based on patients  current state of illness, I anticipate that, after discharge, patient will require TBD.

## 2024-04-28 NOTE — PLAN OF CARE
Patient alert and oriented x4. Afebrile. Room air. No SOB. Complained of abdominal pain, PRN Norco given with good relief. No further complaints of nausea, vomiting and diarrhea noted so far. Maintained on Enteric Isolation due to Norovirus positive result. Tolerating Clear liquid diet, may advance diet as tolerated per Hospitalist. Up with assist. Fall precautions in place. Call light in reach. Needs attended. Will continue Plan of care.     Problem: GASTROINTESTINAL - ADULT  Goal: Minimal or absence of nausea and vomiting  Description: INTERVENTIONS:  - Maintain adequate hydration with IV or PO as ordered and tolerated  - Nasogastric tube to low intermittent suction as ordered  - Evaluate effectiveness of ordered antiemetic medications  - Provide nonpharmacologic comfort measures as appropriate  - Advance diet as tolerated, if ordered  - Obtain nutritional consult as needed  - Evaluate fluid balance  Outcome: Progressing  Goal: Maintains or returns to baseline bowel function  Description: INTERVENTIONS:  - Assess bowel function  - Maintain adequate hydration with IV or PO as ordered and tolerated  - Evaluate effectiveness of GI medications  - Encourage mobilization and activity  - Obtain nutritional consult as needed  - Establish a toileting routine/schedule  - Consider collaborating with pharmacy to review patient's medication profile  Outcome: Progressing     Problem: METABOLIC/FLUID AND ELECTROLYTES - ADULT  Goal: Glucose maintained within prescribed range  Description: INTERVENTIONS:  - Monitor Blood Glucose as ordered  - Assess for signs and symptoms of hyperglycemia and hypoglycemia  - Administer ordered medications to maintain glucose within target range  - Assess barriers to adequate nutritional intake and initiate nutrition consult as needed  - Instruct patient on self management of diabetes  Outcome: Progressing  Goal: Electrolytes maintained within normal limits  Description: INTERVENTIONS:  - Monitor  labs and rhythm and assess patient for signs and symptoms of electrolyte imbalances  - Administer electrolyte replacement as ordered  - Monitor response to electrolyte replacements, including rhythm and repeat lab results as appropriate  - Fluid restriction as ordered  - Instruct patient on fluid and nutrition restrictions as appropriate  Outcome: Progressing     Problem: PAIN - ADULT  Goal: Verbalizes/displays adequate comfort level or patient's stated pain goal  Description: INTERVENTIONS:  - Encourage pt to monitor pain and request assistance  - Assess pain using appropriate pain scale  - Administer analgesics based on type and severity of pain and evaluate response  - Implement non-pharmacological measures as appropriate and evaluate response  - Consider cultural and social influences on pain and pain management  - Manage/alleviate anxiety  - Utilize distraction and/or relaxation techniques  - Monitor for opioid side effects  - Notify MD/LIP if interventions unsuccessful or patient reports new pain  - Anticipate increased pain with activity and pre-medicate as appropriate  Outcome: Progressing

## 2024-04-28 NOTE — PLAN OF CARE
Patient alert oriented times four, on room air. Patient has inspiratory and expiratory wheezing. Breathing treatments were ordered by hospitalist,  Patient was also given a oral dose of prednisone.

## 2024-04-29 VITALS
WEIGHT: 189 LBS | RESPIRATION RATE: 18 BRPM | HEART RATE: 102 BPM | SYSTOLIC BLOOD PRESSURE: 167 MMHG | BODY MASS INDEX: 32.27 KG/M2 | OXYGEN SATURATION: 98 % | HEIGHT: 64 IN | DIASTOLIC BLOOD PRESSURE: 72 MMHG | TEMPERATURE: 98 F

## 2024-04-29 LAB
GLUCOSE BLD-MCNC: 112 MG/DL (ref 70–99)
GLUCOSE BLD-MCNC: 126 MG/DL (ref 70–99)
GLUCOSE BLD-MCNC: 129 MG/DL (ref 70–99)

## 2024-04-29 PROCEDURE — 99239 HOSP IP/OBS DSCHRG MGMT >30: CPT | Performed by: STUDENT IN AN ORGANIZED HEALTH CARE EDUCATION/TRAINING PROGRAM

## 2024-04-29 RX ORDER — ONDANSETRON 4 MG/1
4 TABLET, ORALLY DISINTEGRATING ORAL EVERY 8 HOURS PRN
Qty: 30 TABLET | Refills: 0 | Status: SHIPPED | OUTPATIENT
Start: 2024-04-29

## 2024-04-29 RX ORDER — LOSARTAN POTASSIUM 100 MG/1
100 TABLET ORAL DAILY
COMMUNITY

## 2024-04-29 RX ORDER — PREDNISONE 20 MG/1
40 TABLET ORAL DAILY
Qty: 6 TABLET | Refills: 0 | Status: SHIPPED | OUTPATIENT
Start: 2024-04-30 | End: 2024-05-03

## 2024-04-29 NOTE — DISCHARGE SUMMARY
[facility]     Lisbet Velarde Patient Status:  Inpatient    1945 MRN [id number]   Location [facility] 4NW-A Attending Isela Higginbotham MD   Hosp Day # 2 PCP Mumtaz Malone MD     Date of Admission: 2024  Date of Discharge:   2024    Discharge Disposition: Home or Self Care    Discharge Diagnosis:  #Abdominal pain, N v  Lipase, LFT normal  Supportive Care  Stool studies  If no improvement in AM, will scan abdomen  #urine retention  Pt reports some hesitancy at home  Bladder scan   UA  #Leucocytosis likely due to above  CBc in AM  #HypoK  Due to diarrhea   #DM type 2  ISS  #chronic pain  #Hypertension  Hold ARB due to mild renal insufficiency, continue BB, CCB  PRN   #HfPEF no acute exacerbation    History of Present Illness:   Lisbet Velarde is a 79 year old female with  h/o CHF, chronic bronchia obstruction w stent, known Lung mass , Hypertension, DM,HLD. Patient is presenting one day of abdoinal pain, nausea ongoing emesis not tolerating PO. She has had loose stools. Was on abx 1-2 months ago. She had a tmax of 99 yesterday and some chills.     Brief Synopsis:   Pt admitted, diagnosed with norovirus . Pt tolerating PO on DC. Noted to have wheezing- improved with prednisone, continue 5 days total on DC.     Lace+ Score: 64  59-90 High Risk  29-58 Medium Risk  0-28   Low Risk       TCM Follow-Up Recommendation:  LACE > 58: High Risk of readmission after discharge from the hospital.      Procedures during hospitalization:   no    Incidental or significant findings and recommendations (brief descriptions):  no    Lab/Test results pending at Discharge:   no    Consultants:  no    Discharge Medication List:     Discharge Medications        START taking these medications        Instructions Prescription details   ondansetron 4 MG Tbdp  Commonly known as: Zofran-ODT      Take 1 tablet (4 mg total) by mouth every 8 (eight) hours as needed for Nausea.   Quantity: 30  tablet  Refills: 0     predniSONE 20 MG Tabs  Commonly known as: Deltasone  Start taking on: April 30, 2024      Take 2 tablets (40 mg total) by mouth daily for 3 days.   Stop taking on: May 3, 2024  Quantity: 6 tablet  Refills: 0            CONTINUE taking these medications        Instructions Prescription details   albuterol 108 (90 Base) MCG/ACT Aers  Commonly known as: Ventolin HFA      Inhale 2 puffs into the lungs every 4 (four) hours as needed for Wheezing or Shortness of Breath. CARRY YOUR INHALER WITH YOU.   Quantity: 1 Inhaler  Refills: 0     amLODIPine 10 MG Tabs  Commonly known as: Norvasc      Take 1 tablet (10 mg total) by mouth daily.   Quantity: 30 tablet  Refills: 1     atorvastatin 10 MG Tabs  Commonly known as: Lipitor      Take 1 tablet (10 mg total) by mouth nightly.   Refills: 0     Budesonide-Formoterol Fumarate 160-4.5 MCG/ACT Aero  Commonly known as: SYMBICORT      Inhale 2 puffs into the lungs daily.   Refills: 0     hydroCHLOROthiazide 12.5 MG Tabs      Take 1 tablet (12.5 mg total) by mouth daily.   Refills: 0     HYDROcodone-acetaminophen  MG Tabs  Commonly known as: Norco      Take 1 tablet by mouth every 6 (six) hours as needed.   Quantity: 21 tablet  Refills: 0     ipratropium-albuterol 0.5-2.5 (3) MG/3ML Soln  Commonly known as: Duoneb      Take 3 mL by nebulization 4 (four) times daily as needed.   Refills: 0     losartan 100 MG Tabs  Commonly known as: Cozaar      Take 1 tablet (100 mg total) by mouth daily.   Refills: 0     metoprolol tartrate 25 MG Tabs  Commonly known as: Lopressor      Take 1 tablet (25 mg total) by mouth 2x Daily(Beta Blocker).   Quantity: 60 tablet  Refills: 0     pantoprazole 40 MG Tbec  Commonly known as: Protonix      1 tablet (40 mg total) daily.   Refills: 0               Where to Get Your Medications        These medications were sent to [id number] - [place], [place] - [address] [phone], [phone] [address] [facility]  [id number]      Phone: [phone]   ondansetron 4 MG Tbdp  predniSONE 20 MG Tabs         [place] reviewed[place] n/a    Follow-up appointment:   Mumtaz Malone MD  [address] [facility] [id number] [phone]    Follow up  As needed, If symptoms worsen    Appointments for Next 30 Days 2024 - 2024      None            Vital signs:  Temp:  [97.9 °F (36.6 °C)-98.7 °F (37.1 °C)] 97.9 °F (36.6 °C)  Pulse:  [] 103  Resp:  [16-20] 20  BP: (102-184)/(65-83) 169/74  SpO2:  [97 %-100 %] 97 %    Physical Exam:    General: No acute distress   Lungs: clear to auscultation  Cardiovascular: S1, S2  Abdomen: Soft      -----------------------------------------------------------------------------------------------  PATIENT DISCHARGE INSTRUCTIONS: See electronic chart    Isela Higginbotham MD    Total time spent on discharge plannin minutes     The  Century Cures Act makes medical notes like these available to patients in the interest of transparency. Please be advised this is a medical document. Medical documents are intended to carry relevant information, facts as evident, and the clinical opinion of the practitioner. The medical note is intended as peer to peer communication and may appear blunt or direct. It is written in medical language and may contain abbreviations or verbiage that are unfamiliar.

## 2024-04-29 NOTE — PLAN OF CARE
Patient alert and oriented x4. VSS - BP improved. See flowsheets. Afebrile. On RA satting in the mid 90s. No c/o n/v/d. C/o BLE pain. PRN norco administered. See MAR. No BG coverage provided per parameters. Safety precautions continued. Call light within reach.   0421: SBP >180. PRN hydralazine administered  Problem: GASTROINTESTINAL - ADULT  Goal: Minimal or absence of nausea and vomiting  Description: INTERVENTIONS:  - Maintain adequate hydration with IV or PO as ordered and tolerated  - Nasogastric tube to low intermittent suction as ordered  - Evaluate effectiveness of ordered antiemetic medications  - Provide nonpharmacologic comfort measures as appropriate  - Advance diet as tolerated, if ordered  - Obtain nutritional consult as needed  - Evaluate fluid balance  Outcome: Progressing     Problem: METABOLIC/FLUID AND ELECTROLYTES - ADULT  Goal: Glucose maintained within prescribed range  Description: INTERVENTIONS:  - Monitor Blood Glucose as ordered  - Assess for signs and symptoms of hyperglycemia and hypoglycemia  - Administer ordered medications to maintain glucose within target range  - Assess barriers to adequate nutritional intake and initiate nutrition consult as needed  - Instruct patient on self management of diabetes  Outcome: Progressing  Goal: Electrolytes maintained within normal limits  Description: INTERVENTIONS:  - Monitor labs and rhythm and assess patient for signs and symptoms of electrolyte imbalances  - Administer electrolyte replacement as ordered  - Monitor response to electrolyte replacements, including rhythm and repeat lab results as appropriate  - Fluid restriction as ordered  - Instruct patient on fluid and nutrition restrictions as appropriate  Outcome: Progressing     Problem: GASTROINTESTINAL - ADULT  Goal: Maintains or returns to baseline bowel function  Description: INTERVENTIONS:  - Assess bowel function  - Maintain adequate hydration with IV or PO as ordered and tolerated  -  Evaluate effectiveness of GI medications  - Encourage mobilization and activity  - Obtain nutritional consult as needed  - Establish a toileting routine/schedule  - Consider collaborating with pharmacy to review patient's medication profile  Outcome: Not Progressing     Problem: PAIN - ADULT  Goal: Verbalizes/displays adequate comfort level or patient's stated pain goal  Description: INTERVENTIONS:  - Encourage pt to monitor pain and request assistance  - Assess pain using appropriate pain scale  - Administer analgesics based on type and severity of pain and evaluate response  - Implement non-pharmacological measures as appropriate and evaluate response  - Consider cultural and social influences on pain and pain management  - Manage/alleviate anxiety  - Utilize distraction and/or relaxation techniques  - Monitor for opioid side effects  - Notify MD/LIP if interventions unsuccessful or patient reports new pain  - Anticipate increased pain with activity and pre-medicate as appropriate  Outcome: Not Progressing

## 2024-04-29 NOTE — PLAN OF CARE
NURSING DISCHARGE NOTE    Discharged Home via Wheelchair.  Accompanied by  Daughter  Belongings Taken by patient/family.

## 2024-04-29 NOTE — PLAN OF CARE
Patient states, headache has decreased to a number 4   While rounding discussed bp readings with night nurse. Bp 162/65, night nurse will continue to monitor.

## 2024-04-30 NOTE — PAYOR COMM NOTE
--------------  ADMISSION REVIEW   4/27-4/29  Payor: [employer]  Subscriber #:  [id number]  Authorization Number: [id number]    Admit date: 4/27/24  Admit time: 10:53 AM       REVIEW DOCUMENTATION:  ED Provider Notes signed by Maverick Conner MD at 4/27/2024  9:46 AM    Patient Seen in: [facility] Emergency Department  History     Chief Complaint   Patient presents with    Nausea/vomiting    Cough/URI     Stated Complaint: pt states vomiting and coughing all night    Subjective:   HPI    79-year-old female complaining vomiting.  The patient describes that she has sarcoidosis and had a right mainstem bronchus stenting 1996 that is not being treated.  States she yesterday morning started vomiting has had coughing several episodes of vomiting and diarrhea throughout the night.  She does feel lightheaded and weak when she stands at times.  She did have Keflex about a month ago.  For respiratory infection.  She has chronic pain and takes 10 mg hydrocodone 3-4 times a day as not taking it during the day yesterday because she could not hold it down.  No fever.    Objective:   Past Medical History:    Arthritis    Bronchial obstruction s/p right mainstem stent    Congestive heart disease (HCC)    COPD (chronic obstructive pulmonary disease) (HCC)    Diabetes mellitus (HCC)    Esophageal reflux    Hearing loss    HTN (hypertension)    Hyperlipidemia    Osteoarthritis    Sarcoidosis   Positive for stated complaint: pt states vomiting and coughing all night  Other systems are as noted in HPI.  Constitutional and vital signs reviewed.      All other systems reviewed and negative except as noted above.    Physical Exam     ED Triage Vitals   BP 04/27/24 0725 (!) 187/75   Pulse 04/27/24 0725 95   Resp 04/27/24 0725 20   Temp 04/27/24 0728 97.3 °F (36.3 °C)   Temp src 04/27/24 0728 Temporal   SpO2 04/27/24 0725 98 %   O2 Device 04/27/24 0725 None (Room air)     Current:BP (!) 187/75   Pulse 95   Temp 97.3 °F  (36.3 °C) (Temporal)   Resp 20   Ht 162.6 cm (5' 4\")   Wt 85.7 kg   SpO2 98%   BMI 32.44 kg/m²     Patient is alert orient x 3 no acute distress HEENT exam within normal limits neck there is no lymphadenopathy or JVD lungs rhonchi on the right side clear on the left cardiovascular exam shows regular rate and rhythm without murmurs abdomen soft and nontender extremities normal skin is no rash.    ED Course     Labs Reviewed   COMP METABOLIC PANEL (14) - Abnormal; Notable for the following components:       Result Value    Glucose 216 (*)     Potassium 2.9 (*)     Creatinine 1.13 (*)     Calculated Osmolality 299 (*)     eGFR-Cr 50 (*)     ALT 12 (*)     Total Protein 8.8 (*)     Globulin  5.0 (*)     A/G Ratio 0.8 (*)     All other components within normal limits   CBC W/ DIFFERENTIAL - Abnormal; Notable for the following components:    WBC 17.0 (*)     HGB 11.5 (*)     Neutrophil Absolute Prelim 13.20 (*)     Neutrophil Absolute 13.20 (*)     All other components within normal limits   C. DIFFICILE(TOXIGENIC)PCR - Normal   CBC WITH DIFFERENTIAL WITH PLATELET    Narrative:     The following orders were created for panel order CBC With Differential With Platelet.  Procedure                               Abnormality         Status                     ---------                               -----------         ------                     CBC W/ DIFFERENTIAL[[id number]]          Abnormal            Final result                 Please view results for these tests on the individual orders.   URINALYSIS WITH CULTURE REFLEX   RAINBOW DRAW LAVENDER   RAINBOW DRAW LIGHT GREEN   RAINBOW DRAW BLUE   RAINBOW DRAW GOLD   SARS-COV-2/FLU A AND B/RSV BY PCR (GENEXPERT)   GI STOOL PANEL BY PCR      Patient's white blood cell count was 17 potassium 2.9  XR CHEST AP PORTABLE  (CPT=71045)    Result Date: 4/27/2024  CONCLUSION:  Stable small right pleural effusion and right lower lobe atelectasis versus consolidation with volume loss  resulting in rightward mediastinal shift.  Superimposed infection/inflammation is not excluded.   LOCATION:  [facility]      Dictated by (CST): Arnulfo Stock MD on 4/27/2024 at 8:17 AM     Finalized by (CST): Arnulfo Stock MD on 4/27/2024 at 8:19 AM      Images independently reviewed there is a stable small right pleural effusion and atelectasis.  Images independent reviewed.  Patient had some gastroenteritis probable     Initial differential diagnosis considered not limited to includes viral or bacterial gastroenteritis C. difficile colitis pneumonia aspiration sepsis dehydration    Admission disposition: 4/27/2024  9:45 AM      Viral etiology and has hypokalemia and leukocytosis and appears somewhat weak needs to come in for IV fluids antiemetics and further observation.  Correction of her potassium.    Disposition and Plan     Clinical Impression:  1. Gastroenteritis    2. Hypokalemia         Disposition:  Admit  4/27/2024  9:45 am    Hospital Problems       Present on Admission  Date Reviewed: 1/18/2024            ICD-10-CM Noted POA    * (Principal) Gastroenteritis K52.9 4/10/2023 Unknown    Leukocytosis D72.829 4/27/2024 Yes               4/27 H&P  Chief Complaint: Nausea         Subjective:  History of Present Illness:      Lisbet Velarde is a 79 year old female with  h/o CHF, chronic bronchia obstruction w stent, known Lung mass , Hypertension, DM,HLD. Patient is presenting one day of abdoinal pain, nausea ongoing emesis not tolerating PO. She has had loose stools. Was on abx 1-2 months ago. She had a tmax of 99 yesterday and some chills.    Assessment & Plan:  #Abdominal pain, N v  Lipase, LFT normal  Supportive Care  Stool studies  If no improvement in AM, will scan abdomen  #urine retention  Pt reports some hesitancy at home  Bladder scan   UA  #Leucocytosis likely due to above  CBc in AM  #HypoK  Due to diarrhea   #DM type 2  ISS  #chronic pain  #Hypertension  Hold ARB due to mild renal insufficiency,  continue BB, CCB  PRN   #HfPEF no acute exacerbation           4/28 IM  Chief Complaint: Nausea         Subjective:  Patient  having nausea.     Lab 04/27/24  0751 04/28/24  0808   WBC 17.0* 11.4*   HGB 11.5* 10.4*   MCV 85.7 85.2   .0 280.0               Recent Labs   Lab 04/27/24  0751 04/27/24  1830 04/28/24  0808   *  --  121*   BUN 15  --  12   CREATSERUM 1.13*  --  0.83   CA 9.7  --  9.0   ALB 3.8  --  3.3*     --  139   K 2.9* 3.9 3.6     --  107   CO2 27.0  --  29.0   ALKPHO 120  --  99   AST 17  --  17   ALT 12*  --  11*   BILT 0.8  --  0.4   TP 8.8*  --  8.0     #Abdominal pain, N v  Lipase, LFT normal  Supportive Care  Stool studies : norovirus   #urine retention  Pt reports some hesitancy at home  Bladder scan   #Leucocytosis likely due to above  CBc in AM  #HypoK  Due to diarrhea   #DM type 2  ISS  #chronic pain  #Hypertension  Hold ARB due to mild renal insufficiency, continue BB, CCB  PRN   #HfPEF no acute exacerbation  #COPD with acute exacerbation  PRednisone, NEBS              Medications 04/27/24 04/28/24 04/29/24   dicyclomine (Bentyl) 10 MG/ML injection 20 mg  Dose: 20 mg  Freq: Once Route: IM  Start: 04/27/24 0808 End: 04/27/24 0809    0809 BS-Given            ipratropium-albuterol (Duoneb) 0.5-2.5 (3) MG/3ML inhalation solution 3 mL  Dose: 3 mL  Freq: Every 6 hours Route: Nebulization  Start: 04/28/24 1100 End: 04/29/24 2005   Order specific questions:        1156 DP-Given   1618 NG-Given       0049 AT-Given   0852 DL-Given   (1400 DL)-Not Given        2005-D/C'd       morphINE PF 2 MG/ML injection 2 mg  Dose: 2 mg  Freq: Once Route: IV  Start: 04/27/24 0754 End: 04/27/24 0758    0758 BS-Given            morphINE PF 4 MG/ML injection 4 mg  Dose: 4 mg  Freq: Once Route: IV  Start: 04/27/24 0900 End: 04/27/24 0904    0904 BS-Given            ondansetron (Zofran) 4 MG/2ML injection 4 mg  Dose: 4 mg  Freq: Once Route: IV  Start: 04/27/24 0913 End: 04/27/24 0913 0913  BS-Given            ondansetron (Zofran) 4 MG/2ML injection 4 mg  Dose: 4 mg  Freq: Once Route: IV  Start: 04/27/24 0754 End: 04/27/24 0758    0758 BS-Given            potassium chloride 40 mEq in 250mL sodium chloride 0.9% IVPB premix  Dose: 40 mEq  Freq: Once Route: IV  Last Dose: 40 mEq (04/27/24 0905)  Start: 04/27/24 0840 End: 04/27/24 1305    0905 BS-New Bag   1056 BS-Handoff           predniSONE (Deltasone) tab 40 mg  Dose: 40 mg  Freq: Daily with breakfast Route: OR  Start: 04/28/24 1100 End: 04/29/24 2005     1121 AD-Given        0906 EF-Given   2005-D/C'd        sodium chloride 0.9 % IV bolus 500 mL  Dose: 500 mL  Freq: Once Route: IV  Last Dose: Stopped (04/27/24 0857)  Start: 04/27/24 0739 End: 04/27/24 0857    0757 BS-New Bag   0857 BS-Stopped           Followed by   sodium chloride 0.9% infusion  Rate: 125 mL/hr  Freq: Continuous Route: IV  Start: 04/27/24 0839 End: 04/28/24 0929    0905 BS-New Bag   1056 BS-Handoff       0021 CD-New Bag   0929-D/C'd                   Medications 04/27/24 04/28/24 04/29/24   sodium chloride 0.9% infusion  Rate: 75 mL/hr  Freq: Continuous Route: IV  Start: 04/27/24 1100 End: 04/27/24 2259    1100 AF-New Bag   2259-D/C'd          sodium chloride 0.9 % IV bolus 500 mL  Dose: 500 mL  Freq: Once Route: IV  Last Dose: Stopped (04/27/24 0857)  Start: 04/27/24 0739 End: 04/27/24 0857    0757 BS-New Bag   0857 BS-Stopped           Followed by   sodium chloride 0.9% infusion  Rate: 125 mL/hr  Freq: Continuous Route: IV  Start: 04/27/24 0839 End: 04/28/24 0929    0905 BS-New Bag   1056 BS-Handoff       0021 CD-New Bag   0929-D/C'd                 Medications 04/27/24 04/28/24 04/29/24   acetaminophen (Tylenol Extra Strength) tab 1,000 mg  Dose: 1,000 mg  Freq: Every 8 hours PRN Route: OR  PRN Reasons: Fever,moderate pain,Headaches  PRN Comment: equal to or greater than 100.4  Start: 04/27/24 1059 End: 04/29/24 2005   Admin Instructions:   do not initiate oral therapy until 6-8  hours after the last IV acetaminophen dose if IV acetaminophen was used previously     1259 AD-Given        2005-D/C'd        hydrALAzine (Apresoline) 20 mg/mL injection 5 mg  Dose: 5 mg  Freq: Every 6 hours PRN Route: IV  PRN Reason: Increased blood pressure  Start: 04/27/24 1421 End: 04/29/24 2005   Admin Instructions:   For SBP> 180     1245 AD-Given        0421 SV-Given   2005-D/C'd       HYDROcodone-acetaminophen (Norco)  MG per tab 1 tablet  Dose: 1 tablet  Freq: Every 6 hours PRN Route: OR  PRN Reasons: moderate pain,severe pain  Start: 04/27/24 1420 End: 04/29/24 2005    1638 AF-Given        0021 CD-Given   0803 AD-Given   1648 AD-Given     2325 SV-Given        0608 SV-Given   1315 EF-Given   2005-D/C'd      metoclopramide (Reglan) 5 mg/mL injection 5 mg  Dose: 5 mg  Freq: Every 8 hours PRN Route: IV  PRN Reasons: Nausea,vomiting  Start: 04/27/24 1059 End: 04/29/24 2005   Admin Instructions:   Default antiemetic sequence (unless otherwise preferred by patient):  1. ondansetron (Zofran) 2. metoclopramide (Reglan)  Wait 15 minutes before proceeding to next medication in sequence.  Follow therapeutic duplication policy    1325 AF-Given         1315 EF-Given   2005-D/C'd       morphINE PF 2 MG/ML injection 0.5 mg  Dose: 0.5 mg  Freq: Every 4 hours PRN Route: IV  PRN Reason: mild pain  Start: 04/27/24 1354 End: 04/27/24 1615   Admin Instructions:   Use PRN reason as a guide and follow range order policy. If oral pain meds are ordered and patient can tolerate oral intake, start with PRN oral pain medications first.    1454 AF-Given   1615-D/C'd               04/29/24 1300 98.3 °F (36.8 °C) 102 18 167/72 Abnormal  98 % -- None (Room air) -- KM   04/29/24 0602 -- 103 -- 169/74 Abnormal  -- -- -- -- SV   04/29/24 0417 97.9 °F (36.6 °C) 97 20 182/83 Abnormal  97 % -- None (Room air) -- TT   04/28/24 2115 98.3 °F (36.8 °C) 88 16 102/69 -- -- None (Room air) -- TT   04/28/24 1947 -- 91 -- 162/65 Abnormal  -- -- --  -- SV   24 1947 -- -- 18 -- -- -- -- -- TT   24 1314 -- -- 18 165/72 Abnormal  -- -- -- -- AD   24 1237 98.7 °F (37.1 °C) 80 18 184/74 Abnormal  100 % -- None (Room air) -- KM   24 1235 -- -- -- 184/74 Abnormal  -- -- -- -- AD   24 1200 -- -- -- -- 99 % -- None (Room air) -- DP   24 0525 98.8 °F (37.1 °C) 87 18 167/68 Abnormal  93 % -- None (Room air) -- CD   24 0026 99.1 °F (37.3 °C) 88 19 161/71 Abnormal  96 % -- None (Room air) -- ES   24 1923 99 °F (37.2 °C) 102 20 174/72 Abnormal  94 % -- None (Room air) -- ES   24 1840 -- 105 -- 161/61 Abnormal  -- -- -- -- AF   24 1102 -- -- -- -- -- 189 lb -- -- AF   24 1102 99 °F (37.2 °C) 110 18 170/77 Abnormal  96 % -- -- -- MK   24 1001 -- 99 20 178/71 Abnormal  100 % -- None (Room air) -- BS   24 0728 97.3 °F (36.3 °C) -- -- -- -- -- -- -- BS   24 0725 -- 95 20 187/75 Abnormal  98 % 189 lb None (Room air) -- BS             --------------  DISCHARGE REVIEW    Payor: [facility] MEDICARE  Subscriber #:  [id number]  Authorization Number: [id number]    Admit date: 24  Admit time:  10:53 AM  Discharge Date: 2024  6:04 PM     Admitting Physician: Fermin Villalba DO  Attending Physician:  No att. providers found  Primary Care Physician: Mumtaz Malone MD          Discharge Summary Notes        Discharge Summary signed by Isela Higginbotham MD at 2024 11:46 AM       Author: Isela Higginbotham MD Specialty: HOSPITALIST, Internal Medicine Author Type: Physician    Filed: 2024 11:46 AM Date of Service: 2024 11:45 AM Status: Signed    : Isela Higginbotham MD (Physician)           [facility]  DISCHARGE SUMMARY     Lisbet Velarde Patient Status:  Inpatient    1945 MRN [id number]   [facility] ED 4NW-A Attending Isela Higginbotham MD   Hosp Day # 2 PCP Mumtaz Malone MD     Date of Admission: 2024  Date of Discharge:    4/29/2024    Discharge Disposition: Home or Self Care    Discharge Diagnosis:  #Abdominal pain, N v  Lipase, LFT normal  Supportive Care  Stool studies  If no improvement in AM, will scan abdomen  #urine retention  Pt reports some hesitancy at home  Bladder scan   UA  #Leucocytosis likely due to above  CBc in AM  #HypoK  Due to diarrhea   #DM type 2  ISS  #chronic pain  #Hypertension  Hold ARB due to mild renal insufficiency, continue BB, CCB  PRN   #HfPEF no acute exacerbation    History of Present Illness:   Lisbet Velarde is a 79 year old female with  h/o CHF, chronic bronchia obstruction w stent, known Lung mass , Hypertension, DM,HLD. Patient is presenting one day of abdoinal pain, nausea ongoing emesis not tolerating PO. She has had loose stools. Was on abx 1-2 months ago. She had a tmax of 99 yesterday and some chills.     Brief Synopsis:   Pt admitted, diagnosed with norovirus . Pt tolerating PO on DC. Noted to have wheezing- improved with prednisone, continue 5 days total on DC.     Lace+ Score: 64  59-90 High Risk  29-58 Medium Risk  0-28   Low Risk       TCM Follow-Up Recommendation:  LACE > 58: High Risk of readmission after discharge from the hospital.      Procedures during hospitalization:   no    Incidental or significant findings and recommendations (brief descriptions):  no    Lab/Test results pending at Discharge:   no    Consultants:  no    Discharge Medication List:     Discharge Medications        START taking these medications        Instructions Prescription details   ondansetron 4 MG Tbdp  Commonly known as: Zofran-ODT      Take 1 tablet (4 mg total) by mouth every 8 (eight) hours as needed for Nausea.   Quantity: 30 tablet  Refills: 0     predniSONE 20 MG Tabs  Commonly known as: Deltasone  Start taking on: April 30, 2024      Take 2 tablets (40 mg total) by mouth daily for 3 days.   Stop taking on: May 3, 2024  Quantity: 6 tablet  Refills: 0            CONTINUE taking these  medications        Instructions Prescription details   albuterol 108 (90 Base) MCG/ACT Aers  Commonly known as: Ventolin HFA      Inhale 2 puffs into the lungs every 4 (four) hours as needed for Wheezing or Shortness of Breath. CARRY YOUR INHALER WITH YOU.   Quantity: 1 Inhaler  Refills: 0     amLODIPine 10 MG Tabs  Commonly known as: Norvasc      Take 1 tablet (10 mg total) by mouth daily.   Quantity: 30 tablet  Refills: 1     atorvastatin 10 MG Tabs  Commonly known as: Lipitor      Take 1 tablet (10 mg total) by mouth nightly.   Refills: 0     Budesonide-Formoterol Fumarate 160-4.5 MCG/ACT Aero  Commonly known as: SYMBICORT      Inhale 2 puffs into the lungs daily.   Refills: 0     hydroCHLOROthiazide 12.5 MG Tabs      Take 1 tablet (12.5 mg total) by mouth daily.   Refills: 0     HYDROcodone-acetaminophen  MG Tabs  Commonly known as: Norco      Take 1 tablet by mouth every 6 (six) hours as needed.   Quantity: 21 tablet  Refills: 0     ipratropium-albuterol 0.5-2.5 (3) MG/3ML Soln  Commonly known as: Duoneb      Take 3 mL by nebulization 4 (four) times daily as needed.   Refills: 0     losartan 100 MG Tabs  Commonly known as: Cozaar      Take 1 tablet (100 mg total) by mouth daily.   Refills: 0     metoprolol tartrate 25 MG Tabs  Commonly known as: Lopressor      Take 1 tablet (25 mg total) by mouth 2x Daily(Beta Blocker).   Quantity: 60 tablet  Refills: 0     pantoprazole 40 MG Tbec  Commonly known as: Protonix      1 tablet (40 mg total) daily.   Refills: 0               Where to Get Your Medications        [id number] [address] [phone], [phone] [address] [facility] [address]      Phone: [phone]   ondansetron 4 MG Tbdp  predniSONE 20 MG Tabs         [place]: [place]    Follow-up appointment:   Mumtaz Malone MD  [address] [facility] [address] [address] [phone]    Follow up  As needed, If symptoms  worsen    Appointments for Next 30 Days 2024 - 2024      None            Vital signs:  Temp:  [97.9 °F (36.6 °C)-98.7 °F (37.1 °C)] 97.9 °F (36.6 °C)  Pulse:  [] 103  Resp:  [16-20] 20  BP: (102-184)/(65-83) 169/74  SpO2:  [97 %-100 %] 97 %    Physical Exam:    General: No acute distress   Lungs: clear to auscultation  Cardiovascular: S1, S2  Abdomen: Soft      -----------------------------------------------------------------------------------------------  PATIENT DISCHARGE INSTRUCTIONS: See electronic chart    Isela Higginbotham MD    Total time spent on discharge plannin minutes     The  [employer] Act makes medical notes like these available to patients in the interest of transparency. Please be advised this is a medical document. Medical documents are intended to carry relevant information, facts as evident, and the clinical opinion of the practitioner. The medical note is intended as peer to peer communication and may appear blunt or direct. It is written in medical language and may contain abbreviations or verbiage that are unfamiliar.       Electronically signed by Isela Higginbotham MD on 2024 11:46 AM         REVIEWER COMMENTS     [Follow - Up] : a follow-up visit [Hypothyroidism] : hypothyroidism [Follow-Up: _____] : a [unfilled] follow-up visit

## 2024-08-20 ENCOUNTER — HOSPITAL ENCOUNTER (OUTPATIENT)
Facility: HOSPITAL | Age: 79
Setting detail: OBSERVATION
Discharge: HOME | End: 2024-08-21
Attending: EMERGENCY MEDICINE | Admitting: INTERNAL MEDICINE
Payer: MEDICARE

## 2024-08-20 DIAGNOSIS — R11.2 NAUSEA AND VOMITING, UNSPECIFIED VOMITING TYPE: Primary | ICD-10-CM

## 2024-08-20 LAB
ALBUMIN SERPL-MCNC: 4.8 G/DL (ref 3.2–4.8)
ALBUMIN/GLOB SERPL: 1.4 {RATIO} (ref 1–2)
ALP LIVER SERPL-CCNC: 94 U/L
ALT SERPL-CCNC: 10 U/L
ANION GAP SERPL CALC-SCNC: 5 MMOL/L (ref 0–18)
AST SERPL-CCNC: 22 U/L (ref ?–34)
BASOPHILS # BLD AUTO: 0.03 X10(3) UL (ref 0–0.2)
BASOPHILS NFR BLD AUTO: 0.3 %
BILIRUB SERPL-MCNC: 0.7 MG/DL (ref 0.2–1.1)
BILIRUB UR QL STRIP.AUTO: NEGATIVE
BUN BLD-MCNC: 14 MG/DL (ref 9–23)
CALCIUM BLD-MCNC: 9.9 MG/DL (ref 8.7–10.4)
CHLORIDE SERPL-SCNC: 105 MMOL/L (ref 98–112)
CLARITY UR REFRACT.AUTO: CLEAR
CO2 SERPL-SCNC: 30 MMOL/L (ref 21–32)
COLOR UR AUTO: YELLOW
CREAT BLD-MCNC: 0.9 MG/DL
EGFRCR SERPLBLD CKD-EPI 2021: 65 ML/MIN/1.73M2 (ref 60–?)
EOSINOPHIL # BLD AUTO: 0.13 X10(3) UL (ref 0–0.7)
EOSINOPHIL NFR BLD AUTO: 1.2 %
ERYTHROCYTE [DISTWIDTH] IN BLOOD BY AUTOMATED COUNT: 13.3 %
GLOBULIN PLAS-MCNC: 3.4 G/DL (ref 2–3.5)
GLUCOSE BLD-MCNC: 102 MG/DL (ref 70–99)
GLUCOSE BLD-MCNC: 120 MG/DL (ref 70–99)
GLUCOSE UR STRIP.AUTO-MCNC: NEGATIVE MG/DL
HCT VFR BLD AUTO: 35 %
HGB BLD-MCNC: 11.4 G/DL
IMM GRANULOCYTES # BLD AUTO: 0.03 X10(3) UL (ref 0–1)
IMM GRANULOCYTES NFR BLD: 0.3 %
KETONES UR STRIP.AUTO-MCNC: NEGATIVE MG/DL
LEUKOCYTE ESTERASE UR QL STRIP.AUTO: NEGATIVE
LIPASE SERPL-CCNC: 23 U/L (ref 12–53)
LYMPHOCYTES # BLD AUTO: 4.61 X10(3) UL (ref 1–4)
LYMPHOCYTES NFR BLD AUTO: 42.6 %
MCH RBC QN AUTO: 28.1 PG (ref 26–34)
MCHC RBC AUTO-ENTMCNC: 32.6 G/DL (ref 31–37)
MCV RBC AUTO: 86.2 FL
MONOCYTES # BLD AUTO: 1 X10(3) UL (ref 0.1–1)
MONOCYTES NFR BLD AUTO: 9.2 %
NEUTROPHILS # BLD AUTO: 5.03 X10 (3) UL (ref 1.5–7.7)
NEUTROPHILS # BLD AUTO: 5.03 X10(3) UL (ref 1.5–7.7)
NEUTROPHILS NFR BLD AUTO: 46.4 %
NITRITE UR QL STRIP.AUTO: NEGATIVE
OSMOLALITY SERPL CALC.SUM OF ELEC: 292 MOSM/KG (ref 275–295)
PH UR STRIP.AUTO: 5.5 [PH] (ref 5–8)
PLATELET # BLD AUTO: 272 10(3)UL (ref 150–450)
POTASSIUM SERPL-SCNC: 4.4 MMOL/L (ref 3.5–5.1)
PROT SERPL-MCNC: 8.2 G/DL (ref 5.7–8.2)
PROT UR STRIP.AUTO-MCNC: NEGATIVE MG/DL
RBC # BLD AUTO: 4.06 X10(6)UL
RBC UR QL AUTO: NEGATIVE
SARS-COV-2 RNA RESP QL NAA+PROBE: NOT DETECTED
SODIUM SERPL-SCNC: 140 MMOL/L (ref 136–145)
SP GR UR STRIP.AUTO: 1.01 (ref 1–1.03)
UROBILINOGEN UR STRIP.AUTO-MCNC: 0.2 MG/DL
WBC # BLD AUTO: 10.8 X10(3) UL (ref 4–11)

## 2024-08-20 PROCEDURE — 99223 1ST HOSP IP/OBS HIGH 75: CPT | Performed by: INTERNAL MEDICINE

## 2024-08-20 RX ORDER — AMLODIPINE BESYLATE 5 MG/1
10 TABLET ORAL ONCE
Status: COMPLETED | OUTPATIENT
Start: 2024-08-20 | End: 2024-08-20

## 2024-08-20 RX ORDER — KETOROLAC TROMETHAMINE 15 MG/ML
15 INJECTION, SOLUTION INTRAMUSCULAR; INTRAVENOUS ONCE
Status: COMPLETED | OUTPATIENT
Start: 2024-08-20 | End: 2024-08-20

## 2024-08-20 RX ORDER — ACETAMINOPHEN 500 MG
1000 TABLET ORAL ONCE
Status: COMPLETED | OUTPATIENT
Start: 2024-08-20 | End: 2024-08-20

## 2024-08-20 RX ORDER — DEXTROSE MONOHYDRATE AND SODIUM CHLORIDE 5; .45 G/100ML; G/100ML
INJECTION, SOLUTION INTRAVENOUS CONTINUOUS
Status: CANCELLED | OUTPATIENT
Start: 2024-08-20 | End: 2024-08-20

## 2024-08-20 RX ORDER — FAMOTIDINE 10 MG/ML
20 INJECTION, SOLUTION INTRAVENOUS ONCE
Status: COMPLETED | OUTPATIENT
Start: 2024-08-20 | End: 2024-08-20

## 2024-08-20 RX ORDER — SODIUM CHLORIDE 9 MG/ML
INJECTION, SOLUTION INTRAVENOUS CONTINUOUS
Status: DISCONTINUED | OUTPATIENT
Start: 2024-08-20 | End: 2024-08-21

## 2024-08-20 RX ORDER — METOPROLOL TARTRATE 25 MG/1
25 TABLET, FILM COATED ORAL ONCE
Status: COMPLETED | OUTPATIENT
Start: 2024-08-20 | End: 2024-08-20

## 2024-08-20 RX ORDER — ONDANSETRON 2 MG/ML
4 INJECTION INTRAMUSCULAR; INTRAVENOUS ONCE
Status: COMPLETED | OUTPATIENT
Start: 2024-08-20 | End: 2024-08-20

## 2024-08-20 RX ORDER — ONDANSETRON 2 MG/ML
4 INJECTION INTRAMUSCULAR; INTRAVENOUS EVERY 4 HOURS PRN
Status: CANCELLED | OUTPATIENT
Start: 2024-08-20 | End: 2024-08-20

## 2024-08-20 NOTE — ED PROVIDER NOTES
Patient Seen in: [facility] Emergency Department      History     Chief Complaint   Patient presents with    Nausea/Vomiting/Diarrhea     Stated Complaint: N/V    Subjective:   HPI    This is a 79-year-old female past medical history of mass in her lung which she is not getting treatment for, repetitive hemoptysis, COPD, diabetes, GERD, hypertension, hyperlipidemia who presents with nausea and vomiting throughout the night.  She states she had at least 10 episodes.  She has had no appetite and been unable to eat today.  She states that she often coughs up blood and then its followed by vomiting..  She was diaphoretic and she has diffuse bodyaches.  She states that her age she does not want any treatment.  If the mass is cancer and they are just letting it be.  She states she just feels very weak.  She has not been able to eat anything today.  No fevers or chills.  No abdominal pain.  No urinary symptoms.  She lives at home with her daughter.  She reports her daughter works and so she is not home during the day.  She has a twice a week caregiver.  She does not smoke tobacco.  She does not drink alcohol.  She presents here for further evaluation.              Objective:   Past Medical History:    Arthritis    Bronchial obstruction s/p right mainstem stent    Congestive heart disease (HCC)    COPD (chronic obstructive pulmonary disease) (HCC)    Diabetes mellitus (HCC)    Esophageal reflux    Hearing loss    HTN (hypertension)    Hyperlipidemia    Osteoarthritis    Sarcoidosis              Past Surgical History:   Procedure Laterality Date    Appendectomy      Hysterectomy      Joaquin biopsy stereo nodule 2 site bilat (cpt=19081/83682)      2015    Removal of lung,lobectomy      Tonsillectomy      Total abdom hysterectomy                  Social History     Socioeconomic History    Marital status:    Tobacco Use    Smoking status: Never    Smokeless tobacco: Never   Substance and Sexual Activity    Alcohol  use: Never    Drug use: Never     Social Determinants of Health     Food Insecurity: No Food Insecurity (4/27/2024)    Food Insecurity     Food Insecurity: Never true   Transportation Needs: No Transportation Needs (4/27/2024)    Transportation Needs     Lack of Transportation: No   Housing Stability: Low Risk  (4/27/2024)    Housing Stability     Housing Instability: No              Review of Systems    Positive for stated Chief Complaint: Nausea/Vomiting/Diarrhea    Other systems are as noted in HPI.  Constitutional and vital signs reviewed.      All other systems reviewed and negative except as noted above.    Physical Exam     ED Triage Vitals   BP 08/20/24 1604 160/82   Pulse 08/20/24 1604 108   Resp 08/20/24 1604 22   Temp 08/20/24 1604 98.5 °F (36.9 °C)   Temp src 08/20/24 1604 Oral   SpO2 08/20/24 1604 96 %   O2 Device 08/20/24 1730 Nasal cannula       Current Vitals:   Vital Signs  BP: (!) 175/69  Pulse: 104  Resp: 17  Temp: 98.5 °F (36.9 °C)  Temp src: Oral  MAP (mmHg): 95    Oxygen Therapy  SpO2: 100 %  O2 Device: Nasal cannula  O2 Flow Rate (L/min): 2 L/min            Physical Exam    GENERAL: Awake, alert oriented x3, nontoxic appearing.   SKIN: Pale, warm, and dry.  HEENT:  Pupils equally round and reactive to light. Conjuctiva clear.  Oropharynx is clear and moist.   Lungs: Clear to auscultation bilaterally with no rales, no retractions, and no wheezing.  HEART:  Regular rate and rhythm. S1 and S2. No murmurs, no rubs or gallops.   ABDOMEN: Soft, nontender and nondistended. Normoactive bowel sounds. No rebound. No guarding.   EXTREMITIES: Warm with brisk capillary refill.         ED Course     Labs Reviewed   COMP METABOLIC PANEL (14) - Abnormal; Notable for the following components:       Result Value    Glucose 120 (*)     All other components within normal limits   CBC WITH DIFFERENTIAL WITH PLATELET - Abnormal; Notable for the following components:    HGB 11.4 (*)     Lymphocyte Absolute 4.61 (*)      All other components within normal limits   POCT GLUCOSE - Abnormal; Notable for the following components:    POC Glucose 102 (*)     All other components within normal limits   LIPASE - Normal   RAPID SARS-COV-2 BY PCR - Normal   URINALYSIS WITH CULTURE REFLEX   RAINBOW DRAW BLUE   RAINBOW DRAW GOLD     EKG    Rate, intervals and axes as noted on EKG Report.  Rate: 103  Rhythm: Sinus tachycardia   Reading: Nonspecific ST changes                          MDM    This is a 79-year-old female past medical history of mass in her lung which she is not getting treatment for, repetitive hemoptysis, COPD, diabetes, GERD, hypertension, hyperlipidemia who presents with nausea and vomiting throughout the night.  Differential includes gastritis, viral syndrome viral gastritis, enteritis.      IV was established and normal saline.  Patient was given a 1 L bolus.  IV Zofran was given.  She was kept NPO.  Basic labs were obtained.  CBC: White blood cell count 10.8.  Hemoglobin 1.4.  Platelet 272.  CMP: BUN 14.  Creatinine 0.9.  Glucose 120.  Bicarb 30.  Lipase 23.      Rapid COVID: Negative    Urinalysis: Negative      Patient was given Tylenol for a headache.  She was able to tolerate that.  Her blood pressure is elevated.  She was given her home medications amlodipine/metoprolol as she has not taken it today.    Will admit for continued IV hydration.  Advance diet as tolerated.  Hopefully discharge home tomorrow.  We discussed that she would be admitted for observation only.  She states she does not feel safe going home.  Case discussed with [facility] hospitalist Dr. Sarmiento.                  Disposition and Plan     Clinical Impression:  1. Nausea and vomiting, unspecified vomiting type         Disposition:  Admit  8/20/2024  8:04 pm    Follow-up:  No follow-up provider specified.        Medications Prescribed:  Current Discharge Medication List                            Hospital Problems       Present on Admission  Date  Reviewed: 1/18/2024            ICD-10-CM Noted POA    * (Principal) Nausea and vomiting, unspecified vomiting type R11.2 8/20/2024 Unknown

## 2024-08-21 VITALS
SYSTOLIC BLOOD PRESSURE: 148 MMHG | BODY MASS INDEX: 33.46 KG/M2 | TEMPERATURE: 98 F | DIASTOLIC BLOOD PRESSURE: 61 MMHG | RESPIRATION RATE: 18 BRPM | OXYGEN SATURATION: 100 % | HEART RATE: 84 BPM | WEIGHT: 196 LBS | HEIGHT: 64 IN

## 2024-08-21 LAB
ATRIAL RATE: 103 BPM
P AXIS: 33 DEGREES
P-R INTERVAL: 184 MS
Q-T INTERVAL: 340 MS
QRS DURATION: 82 MS
QTC CALCULATION (BEZET): 445 MS
R AXIS: 33 DEGREES
T AXIS: 31 DEGREES
VENTRICULAR RATE: 103 BPM

## 2024-08-21 PROCEDURE — 99239 HOSP IP/OBS DSCHRG MGMT >30: CPT | Performed by: INTERNAL MEDICINE

## 2024-08-21 RX ORDER — AMLODIPINE BESYLATE 5 MG/1
10 TABLET ORAL DAILY
Status: DISCONTINUED | OUTPATIENT
Start: 2024-08-21 | End: 2024-08-21

## 2024-08-21 RX ORDER — METOCLOPRAMIDE HYDROCHLORIDE 5 MG/ML
10 INJECTION INTRAMUSCULAR; INTRAVENOUS EVERY 8 HOURS PRN
Status: DISCONTINUED | OUTPATIENT
Start: 2024-08-21 | End: 2024-08-21

## 2024-08-21 RX ORDER — METOPROLOL TARTRATE 25 MG/1
25 TABLET, FILM COATED ORAL
Status: DISCONTINUED | OUTPATIENT
Start: 2024-08-21 | End: 2024-08-21

## 2024-08-21 RX ORDER — PANTOPRAZOLE SODIUM 40 MG/1
40 TABLET, DELAYED RELEASE ORAL DAILY
Status: DISCONTINUED | OUTPATIENT
Start: 2024-08-21 | End: 2024-08-21

## 2024-08-21 RX ORDER — ALBUTEROL SULFATE 90 UG/1
2 AEROSOL, METERED RESPIRATORY (INHALATION) EVERY 4 HOURS PRN
Status: DISCONTINUED | OUTPATIENT
Start: 2024-08-21 | End: 2024-08-21

## 2024-08-21 RX ORDER — MELATONIN
3 NIGHTLY PRN
Status: DISCONTINUED | OUTPATIENT
Start: 2024-08-21 | End: 2024-08-21

## 2024-08-21 RX ORDER — KETOROLAC TROMETHAMINE 15 MG/ML
15 INJECTION, SOLUTION INTRAMUSCULAR; INTRAVENOUS ONCE
Status: COMPLETED | OUTPATIENT
Start: 2024-08-21 | End: 2024-08-21

## 2024-08-21 RX ORDER — ONDANSETRON 4 MG/1
4 TABLET, ORALLY DISINTEGRATING ORAL EVERY 8 HOURS PRN
Qty: 20 TABLET | Refills: 0 | Status: SHIPPED | OUTPATIENT
Start: 2024-08-21

## 2024-08-21 RX ORDER — ATORVASTATIN CALCIUM 10 MG/1
10 TABLET, FILM COATED ORAL NIGHTLY
Status: DISCONTINUED | OUTPATIENT
Start: 2024-08-21 | End: 2024-08-21

## 2024-08-21 RX ORDER — ONDANSETRON 2 MG/ML
4 INJECTION INTRAMUSCULAR; INTRAVENOUS EVERY 6 HOURS PRN
Status: DISCONTINUED | OUTPATIENT
Start: 2024-08-21 | End: 2024-08-21

## 2024-08-21 RX ORDER — SODIUM CHLORIDE 9 MG/ML
INJECTION, SOLUTION INTRAVENOUS CONTINUOUS
Status: ACTIVE | OUTPATIENT
Start: 2024-08-21 | End: 2024-08-21

## 2024-08-21 RX ORDER — SODIUM PHOSPHATE, DIBASIC AND SODIUM PHOSPHATE, MONOBASIC 7; 19 G/230ML; G/230ML
1 ENEMA RECTAL ONCE AS NEEDED
Status: DISCONTINUED | OUTPATIENT
Start: 2024-08-21 | End: 2024-08-21

## 2024-08-21 RX ORDER — ACETAMINOPHEN 500 MG
500 TABLET ORAL EVERY 4 HOURS PRN
Status: DISCONTINUED | OUTPATIENT
Start: 2024-08-21 | End: 2024-08-21

## 2024-08-21 RX ORDER — SENNOSIDES 8.6 MG
17.2 TABLET ORAL NIGHTLY PRN
Status: DISCONTINUED | OUTPATIENT
Start: 2024-08-21 | End: 2024-08-21

## 2024-08-21 RX ORDER — FLUTICASONE PROPIONATE AND SALMETEROL 500; 50 UG/1; UG/1
1 POWDER RESPIRATORY (INHALATION) 2 TIMES DAILY
Status: DISCONTINUED | OUTPATIENT
Start: 2024-08-21 | End: 2024-08-21

## 2024-08-21 RX ORDER — HYDROCODONE BITARTRATE AND ACETAMINOPHEN 10; 325 MG/1; MG/1
1 TABLET ORAL EVERY 6 HOURS PRN
Status: DISCONTINUED | OUTPATIENT
Start: 2024-08-21 | End: 2024-08-21

## 2024-08-21 RX ORDER — IPRATROPIUM BROMIDE AND ALBUTEROL SULFATE 2.5; .5 MG/3ML; MG/3ML
3 SOLUTION RESPIRATORY (INHALATION) 4 TIMES DAILY PRN
Status: DISCONTINUED | OUTPATIENT
Start: 2024-08-21 | End: 2024-08-21

## 2024-08-21 RX ORDER — ENOXAPARIN SODIUM 100 MG/ML
40 INJECTION SUBCUTANEOUS DAILY
Status: DISCONTINUED | OUTPATIENT
Start: 2024-08-21 | End: 2024-08-21

## 2024-08-21 RX ORDER — LOSARTAN POTASSIUM 100 MG/1
100 TABLET ORAL DAILY
Status: DISCONTINUED | OUTPATIENT
Start: 2024-08-21 | End: 2024-08-21

## 2024-08-21 RX ORDER — BISACODYL 10 MG
10 SUPPOSITORY, RECTAL RECTAL
Status: DISCONTINUED | OUTPATIENT
Start: 2024-08-21 | End: 2024-08-21

## 2024-08-21 RX ORDER — POLYETHYLENE GLYCOL 3350 17 G/17G
17 POWDER, FOR SOLUTION ORAL DAILY PRN
Status: DISCONTINUED | OUTPATIENT
Start: 2024-08-21 | End: 2024-08-21

## 2024-08-21 NOTE — PROGRESS NOTES
[facility]   part of [facility]     Hospitalist Progress Note     Lisbet Velarde Patient Status:  Observation    1945 MRN [id number]   Location [facility] 5NW-A Attending Lefty Arredondo,    Hosp Day # 0 PCP Mumtaz Malone MD     Chief Complaint: Nausea and vomiting    Subjective:     Patient feels better today. No nausea or vomiting. No abdominal pain. No diarrhea or fever.    Objective:    Review of Systems:   A comprehensive review of systems was completed; pertinent positive and negatives stated in subjective.    Vital signs:  Temp:  [98.1 °F (36.7 °C)-98.5 °F (36.9 °C)] 98.1 °F (36.7 °C)  Pulse:  [] 84  Resp:  [14-22] 18  BP: (141-175)/(61-82) 148/61  SpO2:  [96 %-100 %] 100 %    Physical Exam:    General: No acute distress, Alert  Respiratory: Expiratory wheezing (chronic)  Cardiovascular: S1, S2. Regular rate and rhythm  Abdomen: Soft, Non-tender, non-distended, positive bowel sounds  Extremities: No edema    Diagnostic Data:    Labs:  Recent Labs   Lab 24  1654   WBC 10.8   HGB 11.4*   MCV 86.2   .0     Recent Labs   Lab 24  1654   *   BUN 14   CREATSERUM 0.90   CA 9.9   ALB 4.8      K 4.4      CO2 30.0   ALKPHO 94   AST 22   ALT 10   BILT 0.7   TP 8.2     Estimated Creatinine Clearance: 44.5 mL/min (based on SCr of 0.9 mg/dL).    No results for input(s): \"TROP\", \"TROPHS\", \"CK\" in the last 168 hours.    No results for input(s): \"PTP\", \"INR\" in the last 168 hours.     Microbiology  No results found for this visit on 24.    Imaging: Reviewed in Epic.    Medications:    enoxaparin  40 mg Subcutaneous Daily    atorvastatin  10 mg Oral Nightly    fluticasone-salmeterol  1 puff Inhalation BID    metoprolol tartrate  25 mg Oral 2x Daily(Beta Blocker)    pantoprazole  40 mg Oral Daily    amLODIPine  10 mg Oral Daily    losartan  100 mg Oral Daily       Assessment & Plan:      #Intractable nausea, vomiting. Improved  -Suspect gastritis,  acute gastroenteritis. Lipase wnl. No abdominal pain, diarrhea or fever  -Supportive care for now and monitor for improvement   -Stop IVF later today  -Advance diet     #Sarcoidosis c/b airway involvement s/p ALEXI/BI metallic stent placement in 1996  #Suspected stent now endothelialized and chronically obstructed  #Chronic hypoxic respiratory failure on 2 L at home  #COPD/asthma  -Continue home inhalers     #DMII - Not on meds  #HTN - resume metoprolol, amlodipine and losartan. Hold HCTZ  #HLD - statin  #Chronic HFpEF, compensated  #Obesity, BMI 33      Lefyt Arredondo,     Supplementary Documentation:     Quality:  DVT Mechanical Prophylaxis:     Early ambuation  DVT Pharmacologic Prophylaxis   Medication    enoxaparin (Lovenox) 40 MG/0.4ML SUBQ injection 40 mg     Code Status: Full Code  [place]: No urinary catheter in place  RADHA: Today    Discharge is dependent on: Clinical state  At this point Ms. Cristiano Velarde is expected to be discharge to: Home    The 21st Century Cures Act makes medical notes like these available to patients in the interest of transparency. Please be advised this is a medical document. Medical documents are intended to carry relevant information, facts as evident, and the clinical opinion of the practitioner. The medical note is intended as peer to peer communication and may appear blunt or direct. It is written in medical language and may contain abbreviations or verbiage that are unfamiliar.

## 2024-08-21 NOTE — H&P
[facility]     Lisbet Velarde Patient Status:  Emergency    1945 MRN [id number]   Location [facility] EMERGENCY DEPARTMENT Attending Nelly Brennan, DO   Hosp Day # 0 PCP Mumtaz Malone MD     Chief Complaint: intractable nausea, vomiting    Subjective:    History of Present Illness:     Lisbet Velarde is a 79 year old female with history of sarcoidosis c/b airway involvement s/p ALEXI/BI metallic stent placement in , suspected stent now endothelialized and chronically obstructed, chronic hypoxic respiratory failure on 2 L at home, COPD/asthma,  DMII, HTN, HLD, chronic HFpEF presented with intractable nausea, vomiting.     Patient reports being at her baseline health until yesterday evening. She developed recurrent nausea and emesis. Denies abdominal pain, diarrhea, chest pain, SOB. She has chronic hemoptysis without acute change. She ha a chronic wheeze without change.     Patient was afebrile, tachycardic to 100s and hypertensive to 163/80 on arrival to the ED. EKG with sinus tachycardia. CMP and CBC overall unremarkable.     History/Other:    Past Medical History:  Past Medical History:    Arthritis    Bronchial obstruction s/p right mainstem stent    Congestive heart disease (HCC)    COPD (chronic obstructive pulmonary disease) (HCC)    Diabetes mellitus (HCC)    Esophageal reflux    Hearing loss    HTN (hypertension)    Hyperlipidemia    Osteoarthritis    Sarcoidosis     Past Surgical History:   Past Surgical History:   Procedure Laterality Date    Appendectomy      Hysterectomy      Joaquin biopsy stereo nodule 2 site bilat (cpt=19081/13756)          Removal of lung,lobectomy      Tonsillectomy      Total abdom hysterectomy        Family History:   Family History   Problem Relation Age of Onset    Ovarian Cancer Mother 76    Uterine Cancer Mother     Stroke Mother     Breast Cancer Sister 60    Breast Cancer Sister 40    Stroke Father     Mental  Disorder Father     Prostate Cancer Brother      Social History:    reports that she has never smoked. She has never used smokeless tobacco. She reports that she does not drink alcohol and does not use drugs.     Allergies:   Allergies   Allergen Reactions    Augmentin [Amoxicillin-Pot Clavulanate] ANGIOEDEMA     Per patient, tolerates cefdinir    Vancomycin TONGUE SWELLING    Doxycycline NAUSEA AND VOMITING    Levaquin [Levofloxacin] DIZZINESS and OTHER (SEE COMMENTS)     Hot, flushed.       Medications:    No current facility-administered medications on file prior to encounter.     Current Outpatient Medications on File Prior to Encounter   Medication Sig Dispense Refill    ondansetron 4 MG Oral Tablet Dispersible Take 1 tablet (4 mg total) by mouth every 8 (eight) hours as needed for Nausea. 30 tablet 0    losartan 100 MG Oral Tab Take 1 tablet (100 mg total) by mouth daily.      ipratropium-albuterol 0.5-2.5 (3) MG/3ML Inhalation Solution Take 3 mL by nebulization 4 (four) times daily as needed.      amLODIPine 10 MG Oral Tab Take 1 tablet (10 mg total) by mouth daily. 30 tablet 1    hydroCHLOROthiazide 12.5 MG Oral Tab Take 1 tablet (12.5 mg total) by mouth daily.      pantoprazole 40 MG Oral Tab EC 1 tablet (40 mg total) daily.      HYDROcodone-acetaminophen  MG Oral Tab Take 1 tablet by mouth every 6 (six) hours as needed. 21 tablet 0    metoprolol tartrate 25 MG Oral Tab Take 1 tablet (25 mg total) by mouth 2x Daily(Beta Blocker). 60 tablet 0    Budesonide-Formoterol Fumarate 160-4.5 MCG/ACT Inhalation Aerosol Inhale 2 puffs into the lungs daily.      atorvastatin 10 MG Oral Tab Take 1 tablet (10 mg total) by mouth nightly.      Albuterol Sulfate  (90 Base) MCG/ACT Inhalation Aero Soln Inhale 2 puffs into the lungs every 4 (four) hours as needed for Wheezing or Shortness of Breath. CARRY YOUR INHALER WITH YOU. 1 Inhaler 0       Review of Systems:   A comprehensive review of systems was  completed.    Pertinent positives and negatives noted in the HPI.    Objective:   Physical Exam:    /69   Pulse 102   Temp 98.5 °F (36.9 °C) (Oral)   Resp 19   Ht 5' 4\" (1.626 m)   Wt 193 lb (87.5 kg)   SpO2 100%   BMI 33.13 kg/m²   General: No acute distress, Alert  Respiratory: Expiratory wheezing  Cardiovascular: S1, S2. Regular rate and rhythm  Abdomen: Soft, Non-tender, non-distended, positive bowel sounds  Neuro: No new focal deficits  Extremities: No edema    Results:    Labs:      Labs Last 24 Hours:    Recent Labs   Lab 08/20/24  1654   RBC 4.06   HGB 11.4*   HCT 35.0   MCV 86.2   MCH 28.1   MCHC 32.6   RDW 13.3   NEPRELIM 5.03   WBC 10.8   .0       Recent Labs   Lab 08/20/24  1654   *   BUN 14   CREATSERUM 0.90   EGFRCR 65   CA 9.9   ALB 4.8      K 4.4      CO2 30.0   ALKPHO 94   AST 22   ALT 10   BILT 0.7   TP 8.2       Lab Results   Component Value Date    INR 0.96 09/26/2020    INR 0.93 05/01/2018       No results for input(s): \"TROP\", \"TROPHS\", \"CK\" in the last 168 hours.    No results for input(s): \"TROP\", \"PBNP\" in the last 168 hours.    No results for input(s): \"PCT\" in the last 168 hours.    Imaging: Imaging data reviewed in Epic.    Assessment & Plan:      #Intractable nausea, vomiting  Suspect gastritis, acute gastroenteritis. Lipase wnl. US negative.   -Supportive care for now and monitor for improvement     #Sarcoidosis c/b airway involvement s/p ALEXI/BI metallic stent placement in 1996  #Suspected stent now endothelialized and chronically obstructed  #Chronic hypoxic respiratory failure on 2 L at home  #COPD/asthma  -Continue home inhalers    #DMII-Not on meds  #HTN - hold home meds  #HLD - statin  #Chronic HFpEF, compensated  #Obesity, BMI 33    Plan of care discussed with patient    Cherrie Sarmiento MD    Supplementary Documentation:     The 21st Century Cures Act makes medical notes like these available to patients in the interest of transparency. Please  be advised this is a medical document. Medical documents are intended to carry relevant information, facts as evident, and the clinical opinion of the practitioner. The medical note is intended as peer to peer communication and may appear blunt or direct. It is written in medical language and may contain abbreviations or verbiage that are unfamiliar.

## 2024-08-21 NOTE — ED QUICK NOTES
Orders for admission, patient is aware of plan and ready to go upstairs. Any questions, please call ED RN Esha at extension [phone].     Patient Covid vaccination status: Fully vaccinated     COVID Test Ordered in ED: Rapid SARS-CoV-2 by PCR    COVID Suspicion at Admission: N/A    Running Infusions:    sodium chloride 999 mL/hr at 08/20/24 1800        Mental Status/LOC at time of transport: A&Ox4    Other pertinent information:   CIWA score: N/A   NIH score:  N/A

## 2024-08-21 NOTE — DISCHARGE SUMMARY
[facility]     Lisbet Velarde Patient Status:  Observation    1945 MRN [id number]   Location [facility] 5NW-A Attending Lefty Arredondo,    Hosp Day # 0 PCP Mumtaz Malone MD     Date of Admission: 2024  Date of Discharge: 2024  Discharge Disposition: Home or Self Care    Discharge Diagnosis:   #Nausea and vomiting suspected due to gastroenteritis  #Sarcoidosis c/b airway involvement s/p ALEXI/BI metallic stent placement in   #Suspected stent now endothelialized and chronically obstructed  #Chronic hypoxic respiratory failure on 2 L at home  #COPD/asthma  #DMII  #HTN   #HLD   #Chronic HFpEF  #Obesity, BMI 33    History of Present Illness: Lisbet Velarde is a 79 year old female with history of sarcoidosis c/b airway involvement s/p ALEXI/BI metallic stent placement in , suspected stent now endothelialized and chronically obstructed, chronic hypoxic respiratory failure on 2 L at home, COPD/asthma,  DMII, HTN, HLD, chronic HFpEF presented with intractable nausea, vomiting. Patient reports being at her baseline health until yesterday evening. She developed recurrent nausea and emesis. Denies abdominal pain, diarrhea, chest pain, SOB. She has chronic hemoptysis without acute change. She ha a chronic wheeze without change. Patient was afebrile, tachycardic to 100s and hypertensive to 163/80 on arrival to the ED. EKG with sinus tachycardia. CMP and CBC overall unremarkable.     Brief Synopsis: Patient was started on IV fluids.  Her symptoms resolved in the hospital.  Diet was advanced and she was discharged home in stable condition.    Lace+ Score: 75  59-90 High Risk  29-58 Medium Risk  0-28   Low Risk       TCM Follow-Up Recommendation:  LACE > 58: High Risk of readmission after discharge from the hospital.    Procedures during hospitalization:   None    Incidental or significant findings and recommendations (brief descriptions):  None    Lab/Test results  pending at Discharge:   None    Consultants:  None     Discharge Medication List:     Discharge Medications        CONTINUE taking these medications        Instructions Prescription details   albuterol 108 (90 Base) MCG/ACT Aers  Commonly known as: Ventolin HFA      Inhale 2 puffs into the lungs every 4 (four) hours as needed for Wheezing or Shortness of Breath. CARRY YOUR INHALER WITH YOU.   Quantity: 1 Inhaler  Refills: 0     amLODIPine 10 MG Tabs  Commonly known as: Norvasc      Take 1 tablet (10 mg total) by mouth daily.   Quantity: 30 tablet  Refills: 1     atorvastatin 10 MG Tabs  Commonly known as: Lipitor      Take 1 tablet (10 mg total) by mouth nightly.   Refills: 0     Budesonide-Formoterol Fumarate 160-4.5 MCG/ACT Aero  Commonly known as: SYMBICORT      Inhale 2 puffs into the lungs daily.   Refills: 0     hydroCHLOROthiazide 12.5 MG Tabs      Take 1 tablet (12.5 mg total) by mouth daily.   Refills: 0     HYDROcodone-acetaminophen  MG Tabs  Commonly known as: Norco      Take 1 tablet by mouth every 6 (six) hours as needed.   Quantity: 21 tablet  Refills: 0     ipratropium-albuterol 0.5-2.5 (3) MG/3ML Soln  Commonly known as: Duoneb      Take 3 mL by nebulization 4 (four) times daily as needed.   Refills: 0     losartan 100 MG Tabs  Commonly known as: Cozaar      Take 1 tablet (100 mg total) by mouth daily.   Refills: 0     metoprolol tartrate 25 MG Tabs  Commonly known as: Lopressor      Take 1 tablet (25 mg total) by mouth 2x Daily(Beta Blocker).   Quantity: 60 tablet  Refills: 0     ondansetron 4 MG Tbdp  Commonly known as: Zofran-ODT      Take 1 tablet (4 mg total) by mouth every 8 (eight) hours as needed for Nausea.   Quantity: 20 tablet  Refills: 0     pantoprazole 40 MG Tbec  Commonly known as: Protonix      1 tablet (40 mg total) daily.   Refills: 0               Where to Get Your Medications        These medications were sent to [id number] - [place], [place] - [address]  [phone], [phone]  [address], [facility] [address]      Phone: [phone]   ondansetron 4 MG Tbdp         [place] No controlled substances prescribed at discharge.    Follow-up appointment:   Mumtaz Malone MD  [address]  [facility] [id number] [phone]    Follow up in 1 week(s)  Follow up    Appointments for Next 30 Days 8/21/2024 - 9/20/2024      None            Vital signs:  Temp:  [98.1 °F (36.7 °C)-98.5 °F (36.9 °C)] 98.1 °F (36.7 °C)  Pulse:  [] 84  Resp:  [14-22] 18  BP: (141-175)/(61-82) 148/61  SpO2:  [96 %-100 %] 100 %    Physical Exam:    See progress note dated same day.  -----------------------------------------------------------------------------------------------  PATIENT DISCHARGE INSTRUCTIONS: See electronic chart    Lefty Arredondo DO    Time spent:  32 minutes

## 2024-08-21 NOTE — PROGRESS NOTES
Patient is a/o x4. On 2L NC. No tele. VSS. PRN norco for c/o headache. Voids. Diet advanced to regular. Tolerated well. Denies n/v. Discharge planning. Safety precautions in place. No further needs at this time.

## 2024-08-21 NOTE — PROGRESS NOTES
NURSING ADMISSION NOTE      Patient admitted via Cart  Oriented to room 509  Safety precautions initiated.  Bed in low position.    Admission navigator completed.    Alert x4. 2LNC, baseline per pt report. Denies any c/o SOB or respiratory distress. Wheezing noted. Voids. Up SBA. C/o headache at this time but refusing pain meds. IVF infusing. Updated on POC, all questions and concerns addressed to understanding.     Call light in reach.

## 2024-08-21 NOTE — PROGRESS NOTES
NURSING DISCHARGE NOTE    Discharged Home via Wheelchair.  Accompanied by Family member and Support staff  Belongings Taken by patient/family.    AVS explained. Answered all questions and concerns. PIV removed. VSS. No further needs at this time.    yes

## 2024-08-21 NOTE — OCCUPATIONAL THERAPY NOTE
OCCUPATIONAL THERAPY EVALUATION - INPATIENT    Room Number: 509/509-A  Evaluation Date: 8/21/2024     Type of Evaluation: Initial  Presenting Problem: nausea and vomiting, unspecified vomiting type    Physician Order: IP Consult to Occupational Therapy  Reason for Therapy:  ADL/IADL Dysfunction and Discharge Planning      OCCUPATIONAL THERAPY ASSESSMENT   Patient is a 79 year old female admitted on 8/20/2024 with Presenting Problem: nausea and vomiting, unspecified vomiting type. Co-Morbidities : repetitive hemoptysis, COPD, diabetes, GERD, HTN, hyperlipidemia  Patient is currently functioning at baseline with toileting, upper body dressing, lower body dressing, grooming, bed mobility, transfers, static sitting balance, dynamic sitting balance, static standing balance, dynamic standing balance, maintaining seated position, functional standing tolerance, energy conservation strategies, and aerobic capacity.  Prior to admission, patient's baseline is Jame with ADLs/IADLs. Pt lives at home with supportive family and reports she has a caregiver that comes often and assists pt with bathing when needed.  Patient met all OT goals at supervision level.  Patient reports no further questions/concerns at this time.     Patient will be discharged from acute OT services at this time.      WEIGHT BEARING RESTRICTION  Weight Bearing Restriction: None                Recommendations for nursing staff:   Transfers: up x 1, RW, supervision  Toileting location: Toilet    EVALUATION SESSION:  Patient at start of session: Semi supine in bed  FUNCTIONAL TRANSFER ASSESSMENT  Sit to Stand: Edge of Bed  Edge of Bed: Supervision  Toilet Transfer: Supervision    BED MOBILITY  Supine to Sit : Supervision  Scooting: Supervision    BALANCE ASSESSMENT  Static Sitting: Supervision  Sitting Bilateral: Supervision  Static Standing: Supervision  Standing Bilateral: Supervision    FUNCTIONAL ADL ASSESSMENT  UB Dressing Seated: Supervision  LB  Dressing Seated: Supervision  LB Dressing Standing: Supervision  Toileting Seated: Supervision      ACTIVITY TOLERANCE: Pt completed bed mobility with supervision to prepare for seated ADLs. Pt donned/doffed socks seated with supervision and simulated UB dressing with supervision. Pt completed sit <> stand from EOB to prepare for ambulation to increase endurance for standing ADLs. Pt ambulated ~150 ft with RW with SBA. Pt returned to room and completed toilet task with supervision; pt reports she was up earlier in the morning to complete grooming tasks at sink. Pt returned to chair at end of session. Pt did not require any rest breaks during session. Pt on 2L O2 throughout session; pt reports this is baseline for her and utilizes oxygen at home when ambulating as well.                         O2 SATURATIONS       COGNITION  Overall Cognitive Status:  WFL - within functional limits  COGNITION ASSESSMENTS       Upper Extremity:   ROM: within functional limits  Strength: is within functional limits  Coordination:  Gross motor: WFL  Fine motor: WFL  Sensation: Light touch:  intact    EDUCATION PROVIDED  Patient: Role of Occupational Therapy; Plan of Care; Discharge Recommendations; Functional Transfer Techniques; Fall Prevention; Posture/Positioning; Energy Conservation; Compensatory ADL Techniques; Proper Body Mechanics  Patient's Response to Education: Verbalized Understanding; Returned Demonstration    Equipment used: RW  Demonstrates functional use    Therapist comments: Pt pleasant and motivated during session.    Patient End of Session: Up in chair;Needs met;Call light within reach;RN aware of session/findings;All patient questions and concerns addressed;Alarm set    OCCUPATIONAL PROFILE    HOME SITUATION  Type of Home: [facility]  Home Layout: Two level  Lives With: Family;Caregiver part-time    Toilet and Equipment: Comfort height toilet  Shower/Tub and Equipment: Walk-in shower;Shower chair  Other Equipment:   (Home O2-2-3L, RW)       Hand Dominance: Right  Drives: No  Patient Regularly Uses: Glasses    Prior Level of Function: previously Jame with ADLs/IADLs. Pt lives at home with supportive family and reports having a caregiver who comes to assist pt with bathing as needed.    SUBJECTIVE  \"They are so comfortable\" when talking about her Crocs.    PAIN ASSESSMENT  Ratin  Location: Headache  Management Techniques: Relaxation    OBJECTIVE  Precautions: Bed/chair alarm  Fall Risk: Standard fall risk    WEIGHT BEARING RESTRICTION  Weight Bearing Restriction: None                AM-PAC ‘6-Clicks’ Inpatient Daily Activity Short Form  -   Putting on and taking off regular lower body clothing?: None  -   Bathing (including washing, rinsing, drying)?: A Little  -   Toileting, which includes using toilet, bedpan or urinal? : None  -   Putting on and taking off regular upper body clothing?: None  -   Taking care of personal grooming such as brushing teeth?: None  -   Eating meals?: None    AM-PAC Score:  Score: 23  Approx Degree of Impairment: 15.86%  Standardized Score (AM-PAC Scale): 51.12      ADDITIONAL TESTS     NEUROLOGICAL FINDINGS        PLAN   Patient has been evaluated and presents with no skilled Occupational Therapy needs at this time.  Patient discharged from Occupational Therapy services.  Please re-order if a new functional limitation presents during this admission.      Patient Evaluation Complexity Level:   Occupational Profile/Medical History LOW - Brief history including review of medical or therapy records    Specific performance deficits impacting engagement in ADL/IADL LOW  1 - 3 performance deficits    Client Assessment/Performance Deficits LOW - No comorbidities nor modifications of tasks    Clinical Decision Making LOW - Analysis of occupational profile, problem-focused assessments, limited treatment options    Overall Complexity LOW     OT Session Time: 20 minutes  Self-Care Home Management: 15  minutes  Therapeutic Activity: 5 minutes

## 2024-08-21 NOTE — PHYSICAL THERAPY NOTE
PHYSICAL THERAPY EVALUATION - INPATIENT     Room Number: 509/509-A  Evaluation Date: 8/21/2024  Type of Evaluation: Initial  Physician Order: PT Eval and Treat    Presenting Problem: n/v     Reason for Therapy: Mobility Dysfunction and Discharge Planning    PHYSICAL THERAPY ASSESSMENT   Patient is currently functioning near baseline with bed mobility, transfers, gait, and stair negotiation.  Prior to admission, patient's baseline is mod ind.  Patient is requiring supervision and contact guard assist as a result of the following impairments: decreased endurance/aerobic capacity. Physical Therapy will continue to follow for duration of hospitalization.      Patient will benefit from continued skilled PT Services for duration of hospitalization, however, given the patient is functioning near baseline level do not anticipate skilled therapy needs at discharge .    PLAN  PT Treatment Plan: Gait training;Stair training  Rehab Potential : Good  Frequency (Obs):  (2-3x/week)  Number of Visits to Meet Established Goals: 2      CURRENT GOALS    Goal #1    Goal #2 Patient will negotiate 1 flight of stairs with supervision to ensure safety at home.   Goal #3 Patient is able to ambulate 300 feet with assist device:  least restrictive device  at assistance level: supervision     Goal #4    Goal #5    Goal #6    Goal Comments: Goals established on 8/21/2024      PHYSICAL THERAPY MEDICAL/SOCIAL HISTORY  History related to current admission: Patient is a 79 year old female admitted on 8/20/2024 from home for N/V.  Pt diagnosed with gastro.      HOME SITUATION  Type of Home: [facility]   Home Layout: Two level                Lives With: Caregiver part-time;Daughter     Patient Owned Equipment: Rolling walker       Prior Level of [place]: Pt reports mod ind PTA.  Pt has caregiver 2 days per week who will assist with showering as needed and any other tasks.  Pt reports using RW at all times.    SUBJECTIVE  \"She had said a cane  might be better!\"      OBJECTIVE     Fall Risk: Standard fall risk    WEIGHT BEARING RESTRICTION                   PAIN ASSESSMENT  Rating: Unable to rate  Location: headache  Management Techniques: Activity promotion;Body mechanics;Repositioning;Relaxation    COGNITION  Overall Cognitive Status:  WFL - within functional limits    RANGE OF MOTION AND STRENGTH ASSESSMENT  Upper extremity ROM and strength are within functional limits     Lower extremity ROM is within functional limits     Lower extremity strength is within functional limits       BALANCE  Static Sitting: Good  Dynamic Sitting: Good  Static Standing: Fair -  Dynamic Standing: Fair -    ADDITIONAL TESTS                                    ACTIVITY TOLERANCE                         O2 WALK       NEUROLOGICAL FINDINGS                        AM-PAC '6-Clicks' INPATIENT SHORT FORM - BASIC MOBILITY  How much difficulty does the patient currently have...  Patient Difficulty: Turning over in bed (including adjusting bedclothes, sheets and blankets)?: None   Patient Difficulty: Sitting down on and standing up from a chair with arms (e.g., wheelchair, bedside commode, etc.): None   Patient Difficulty: Moving from lying on back to sitting on the side of the bed?: None   How much help from another person does the patient currently need...   Help from Another: Moving to and from a bed to a chair (including a wheelchair)?: None   Help from Another: Need to walk in hospital room?: A Little   Help from Another: Climbing 3-5 steps with a railing?: A Little       AM-PAC Score:  Raw Score: 22   Approx Degree of Impairment: 20.91%   Standardized Score (AM-PAC Scale): 53.28   CMS Modifier (G-Code): CJ    FUNCTIONAL ABILITY STATUS  Gait Assessment   Functional Mobility/Gait Assessment  Gait Assistance: Supervision  Distance (ft): 200  Assistive Device: Rolling walker  Pattern: Shuffle (slowed mary)    Skilled Therapy Provided     Bed Mobility:  Rolling: in  Supine to sit:  ind   Sit to supine: NT     Transfer Mobility:  Sit to stand: ind   Stand to sit: ind  Gait = Ambulation as above with supervision    Therapist's Comments: Anticipate 1 additional session to meet all PT goals.  Pt with increasing headache during ambulation, stairs deferred at this time.  Pt denies concerns re: return to home.    Exercise/Education Provided:  Functional activity tolerated    Patient End of Session: Needs met;With  staff;Call light within reach;RN aware of session/findings;All patient questions and concerns addressed      Patient Evaluation Complexity Level:  History Moderate - 1 or 2 personal factors and/or co-morbidities   Examination of body systems Low -  addressing 1-2 elements   Clinical Presentation Low- Stable   Clinical Decision Making Low Complexity       PT Session Time: 15 minutes    Therapeutic Activity: 8 minutes

## 2024-09-06 DIAGNOSIS — R06.00 DYSPNEA, UNSPECIFIED TYPE: ICD-10-CM

## 2024-09-06 DIAGNOSIS — R10.9 ACUTE ABDOMINAL PAIN: Primary | ICD-10-CM

## 2024-09-09 ENCOUNTER — HOSPITAL ENCOUNTER (OUTPATIENT)
Dept: CT IMAGING | Facility: HOSPITAL | Age: 79
Discharge: HOME | End: 2024-09-09
Attending: STUDENT IN AN ORGANIZED HEALTH CARE EDUCATION/TRAINING PROGRAM
Payer: MEDICARE

## 2024-09-09 ENCOUNTER — LAB ENCOUNTER (OUTPATIENT)
Dept: LAB | Facility: HOSPITAL | Age: 79
End: 2024-09-09
Attending: STUDENT IN AN ORGANIZED HEALTH CARE EDUCATION/TRAINING PROGRAM
Payer: MEDICARE

## 2024-09-09 ENCOUNTER — HOSPITAL ENCOUNTER (OUTPATIENT)
Dept: GENERAL RADIOLOGY | Facility: HOSPITAL | Age: 79
Discharge: HOME | End: 2024-09-09
Attending: STUDENT IN AN ORGANIZED HEALTH CARE EDUCATION/TRAINING PROGRAM
Payer: MEDICARE

## 2024-09-09 DIAGNOSIS — R06.00 DYSPNEA, UNSPECIFIED TYPE: ICD-10-CM

## 2024-09-09 DIAGNOSIS — R10.9 ACUTE ABDOMINAL PAIN: ICD-10-CM

## 2024-09-09 LAB
ALBUMIN SERPL-MCNC: 4.5 G/DL (ref 3.2–4.8)
ALBUMIN/GLOB SERPL: 1.3 {RATIO} (ref 1–2)
ALP LIVER SERPL-CCNC: 87 U/L
ALT SERPL-CCNC: 8 U/L
ANION GAP SERPL CALC-SCNC: 3 MMOL/L (ref 0–18)
AST SERPL-CCNC: 18 U/L (ref ?–34)
BASOPHILS # BLD AUTO: 0.04 X10(3) UL (ref 0–0.2)
BASOPHILS NFR BLD AUTO: 0.4 %
BILIRUB SERPL-MCNC: 0.6 MG/DL (ref 0.2–1.1)
BILIRUB UR QL STRIP.AUTO: NEGATIVE
BUN BLD-MCNC: 13 MG/DL (ref 9–23)
CALCIUM BLD-MCNC: 9.6 MG/DL (ref 8.7–10.4)
CHLORIDE SERPL-SCNC: 101 MMOL/L (ref 98–112)
CLARITY UR REFRACT.AUTO: CLEAR
CO2 SERPL-SCNC: 36 MMOL/L (ref 21–32)
COLOR UR AUTO: YELLOW
CREAT BLD-MCNC: 1.22 MG/DL
EGFRCR SERPLBLD CKD-EPI 2021: 45 ML/MIN/1.73M2 (ref 60–?)
EOSINOPHIL # BLD AUTO: 0.16 X10(3) UL (ref 0–0.7)
EOSINOPHIL NFR BLD AUTO: 1.7 %
ERYTHROCYTE [DISTWIDTH] IN BLOOD BY AUTOMATED COUNT: 13.2 %
FASTING STATUS PATIENT QL REPORTED: NO
GLOBULIN PLAS-MCNC: 3.5 G/DL (ref 2–3.5)
GLUCOSE BLD-MCNC: 88 MG/DL (ref 70–99)
GLUCOSE UR STRIP.AUTO-MCNC: NORMAL MG/DL
HCT VFR BLD AUTO: 34.4 %
HGB BLD-MCNC: 10.9 G/DL
HYALINE CASTS #/AREA URNS AUTO: PRESENT /LPF
IMM GRANULOCYTES # BLD AUTO: 0.01 X10(3) UL (ref 0–1)
IMM GRANULOCYTES NFR BLD: 0.1 %
KETONES UR STRIP.AUTO-MCNC: NEGATIVE MG/DL
LEUKOCYTE ESTERASE UR QL STRIP.AUTO: 25
LIPASE SERPL-CCNC: 26 U/L (ref 12–53)
LYMPHOCYTES # BLD AUTO: 5.08 X10(3) UL (ref 1–4)
LYMPHOCYTES NFR BLD AUTO: 54.9 %
MCH RBC QN AUTO: 27.9 PG (ref 26–34)
MCHC RBC AUTO-ENTMCNC: 31.7 G/DL (ref 31–37)
MCV RBC AUTO: 88 FL
MONOCYTES # BLD AUTO: 0.7 X10(3) UL (ref 0.1–1)
MONOCYTES NFR BLD AUTO: 7.6 %
NEUTROPHILS # BLD AUTO: 3.26 X10 (3) UL (ref 1.5–7.7)
NEUTROPHILS # BLD AUTO: 3.26 X10(3) UL (ref 1.5–7.7)
NEUTROPHILS NFR BLD AUTO: 35.3 %
NITRITE UR QL STRIP.AUTO: NEGATIVE
OSMOLALITY SERPL CALC.SUM OF ELEC: 290 MOSM/KG (ref 275–295)
PH UR STRIP.AUTO: 6 [PH] (ref 5–8)
PLATELET # BLD AUTO: 352 10(3)UL (ref 150–450)
POTASSIUM SERPL-SCNC: 3.4 MMOL/L (ref 3.5–5.1)
PROCALCITONIN SERPL-MCNC: 0.1 NG/ML (ref ?–0.05)
PROT SERPL-MCNC: 8 G/DL (ref 5.7–8.2)
PROT UR STRIP.AUTO-MCNC: 20 MG/DL
RBC # BLD AUTO: 3.91 X10(6)UL
RBC UR QL AUTO: NEGATIVE
SODIUM SERPL-SCNC: 140 MMOL/L (ref 136–145)
SP GR UR STRIP.AUTO: 1.02 (ref 1–1.03)
UROBILINOGEN UR STRIP.AUTO-MCNC: NORMAL MG/DL
WBC # BLD AUTO: 9.3 X10(3) UL (ref 4–11)

## 2024-09-09 PROCEDURE — 87086 URINE CULTURE/COLONY COUNT: CPT

## 2024-09-09 PROCEDURE — 80053 COMPREHEN METABOLIC PANEL: CPT

## 2024-09-09 PROCEDURE — 85025 COMPLETE CBC W/AUTO DIFF WBC: CPT

## 2024-09-09 PROCEDURE — 81001 URINALYSIS AUTO W/SCOPE: CPT

## 2024-09-09 PROCEDURE — 83690 ASSAY OF LIPASE: CPT

## 2024-09-09 PROCEDURE — 74177 CT ABD & PELVIS W/CONTRAST: CPT | Performed by: STUDENT IN AN ORGANIZED HEALTH CARE EDUCATION/TRAINING PROGRAM

## 2024-09-09 PROCEDURE — 71046 X-RAY EXAM CHEST 2 VIEWS: CPT | Performed by: STUDENT IN AN ORGANIZED HEALTH CARE EDUCATION/TRAINING PROGRAM

## 2024-09-09 PROCEDURE — 84145 PROCALCITONIN (PCT): CPT

## 2024-09-09 PROCEDURE — 36415 COLL VENOUS BLD VENIPUNCTURE: CPT

## 2024-10-03 DIAGNOSIS — J96.11 CHRONIC HYPOXIC RESPIRATORY FAILURE (HCC): Primary | ICD-10-CM

## 2024-11-26 ENCOUNTER — LAB ENCOUNTER (OUTPATIENT)
Dept: LAB | Facility: HOSPITAL | Age: 79
End: 2024-11-26
Attending: STUDENT IN AN ORGANIZED HEALTH CARE EDUCATION/TRAINING PROGRAM
Payer: MEDICARE

## 2024-11-26 DIAGNOSIS — R73.01 IMPAIRED FASTING GLUCOSE: ICD-10-CM

## 2024-11-26 DIAGNOSIS — E55.9 VITAMIN D DEFICIENCY: Primary | ICD-10-CM

## 2024-11-26 DIAGNOSIS — E11.9 DIABETES MELLITUS (HCC): ICD-10-CM

## 2024-11-26 DIAGNOSIS — E70.20: ICD-10-CM

## 2024-11-26 DIAGNOSIS — J45.20: ICD-10-CM

## 2024-11-26 DIAGNOSIS — E07.9 DISEASE OF THYROID GLAND: ICD-10-CM

## 2024-11-26 LAB
ALBUMIN SERPL-MCNC: 4.6 G/DL (ref 3.2–4.8)
ALBUMIN/GLOB SERPL: 1.3 {RATIO} (ref 1–2)
ALP LIVER SERPL-CCNC: 109 U/L
ALT SERPL-CCNC: 10 U/L
ANION GAP SERPL CALC-SCNC: 4 MMOL/L (ref 0–18)
AST SERPL-CCNC: 21 U/L (ref ?–34)
BASOPHILS # BLD AUTO: 0.04 X10(3) UL (ref 0–0.2)
BASOPHILS NFR BLD AUTO: 0.4 %
BILIRUB SERPL-MCNC: 0.7 MG/DL (ref 0.2–1.1)
BUN BLD-MCNC: 15 MG/DL (ref 9–23)
CALCIUM BLD-MCNC: 9.6 MG/DL (ref 8.7–10.4)
CHLORIDE SERPL-SCNC: 106 MMOL/L (ref 98–112)
CHOLEST SERPL-MCNC: 142 MG/DL (ref ?–200)
CO2 SERPL-SCNC: 29 MMOL/L (ref 21–32)
CREAT BLD-MCNC: 0.86 MG/DL
DEPRECATED HBV CORE AB SER IA-ACNC: 50 NG/ML
EGFRCR SERPLBLD CKD-EPI 2021: 69 ML/MIN/1.73M2 (ref 60–?)
EOSINOPHIL # BLD AUTO: 0.4 X10(3) UL (ref 0–0.7)
EOSINOPHIL NFR BLD AUTO: 3.9 %
ERYTHROCYTE [DISTWIDTH] IN BLOOD BY AUTOMATED COUNT: 13.4 %
EST. AVERAGE GLUCOSE BLD GHB EST-MCNC: 123 MG/DL (ref 68–126)
FASTING PATIENT LIPID ANSWER: YES
FASTING STATUS PATIENT QL REPORTED: YES
FOLATE SERPL-MCNC: 8.4 NG/ML (ref 5.4–?)
GLOBULIN PLAS-MCNC: 3.5 G/DL (ref 2–3.5)
GLUCOSE BLD-MCNC: 85 MG/DL (ref 70–99)
HBA1C MFR BLD: 5.9 % (ref ?–5.7)
HCT VFR BLD AUTO: 33.8 %
HDLC SERPL-MCNC: 54 MG/DL (ref 40–59)
HGB BLD-MCNC: 10.3 G/DL
IMM GRANULOCYTES # BLD AUTO: 0.02 X10(3) UL (ref 0–1)
IMM GRANULOCYTES NFR BLD: 0.2 %
LDLC SERPL CALC-MCNC: 71 MG/DL (ref ?–100)
LYMPHOCYTES # BLD AUTO: 5.95 X10(3) UL (ref 1–4)
LYMPHOCYTES NFR BLD AUTO: 57.4 %
MCH RBC QN AUTO: 27.1 PG (ref 26–34)
MCHC RBC AUTO-ENTMCNC: 30.5 G/DL (ref 31–37)
MCV RBC AUTO: 88.9 FL
MONOCYTES # BLD AUTO: 0.88 X10(3) UL (ref 0.1–1)
MONOCYTES NFR BLD AUTO: 8.5 %
NEUTROPHILS # BLD AUTO: 3.08 X10 (3) UL (ref 1.5–7.7)
NEUTROPHILS # BLD AUTO: 3.08 X10(3) UL (ref 1.5–7.7)
NEUTROPHILS NFR BLD AUTO: 29.6 %
NONHDLC SERPL-MCNC: 88 MG/DL (ref ?–130)
OSMOLALITY SERPL CALC.SUM OF ELEC: 288 MOSM/KG (ref 275–295)
PLATELET # BLD AUTO: 269 10(3)UL (ref 150–450)
POTASSIUM SERPL-SCNC: 4.3 MMOL/L (ref 3.5–5.1)
PROT SERPL-MCNC: 8.1 G/DL (ref 5.7–8.2)
RBC # BLD AUTO: 3.8 X10(6)UL
SODIUM SERPL-SCNC: 139 MMOL/L (ref 136–145)
TRIGL SERPL-MCNC: 90 MG/DL (ref 30–149)
TSI SER-ACNC: 2.62 UIU/ML (ref 0.55–4.78)
VIT B12 SERPL-MCNC: 718 PG/ML (ref 211–911)
VIT D+METAB SERPL-MCNC: 35.4 NG/ML (ref 30–100)
VLDLC SERPL CALC-MCNC: 14 MG/DL (ref 0–30)
WBC # BLD AUTO: 10.4 X10(3) UL (ref 4–11)

## 2024-11-26 PROCEDURE — 83036 HEMOGLOBIN GLYCOSYLATED A1C: CPT

## 2024-11-26 PROCEDURE — 82728 ASSAY OF FERRITIN: CPT

## 2024-11-26 PROCEDURE — 85025 COMPLETE CBC W/AUTO DIFF WBC: CPT

## 2024-11-26 PROCEDURE — 82607 VITAMIN B-12: CPT

## 2024-11-26 PROCEDURE — 80061 LIPID PANEL: CPT

## 2024-11-26 PROCEDURE — 84443 ASSAY THYROID STIM HORMONE: CPT

## 2024-11-26 PROCEDURE — 36415 COLL VENOUS BLD VENIPUNCTURE: CPT

## 2024-11-26 PROCEDURE — 82746 ASSAY OF FOLIC ACID SERUM: CPT

## 2024-11-26 PROCEDURE — 80053 COMPREHEN METABOLIC PANEL: CPT

## 2024-11-26 PROCEDURE — 82306 VITAMIN D 25 HYDROXY: CPT

## 2025-01-29 ENCOUNTER — APPOINTMENT (OUTPATIENT)
Dept: GENERAL RADIOLOGY | Facility: HOSPITAL | Age: 80
End: 2025-01-29
Payer: MEDICARE

## 2025-01-29 ENCOUNTER — HOSPITAL ENCOUNTER (INPATIENT)
Facility: HOSPITAL | Age: 80
LOS: 2 days | Discharge: HOME | End: 2025-01-31
Attending: EMERGENCY MEDICINE | Admitting: HOSPITALIST
Payer: MEDICARE

## 2025-01-29 DIAGNOSIS — J18.9 COMMUNITY ACQUIRED PNEUMONIA OF RIGHT LUNG, UNSPECIFIED PART OF LUNG: Primary | ICD-10-CM

## 2025-01-29 LAB
ALBUMIN SERPL-MCNC: 4.2 G/DL (ref 3.2–4.8)
ALBUMIN/GLOB SERPL: 1.1 {RATIO} (ref 1–2)
ALP LIVER SERPL-CCNC: 132 U/L
ALT SERPL-CCNC: 8 U/L
ANION GAP SERPL CALC-SCNC: 10 MMOL/L (ref 0–18)
AST SERPL-CCNC: 17 U/L (ref ?–34)
ATRIAL RATE: 88 BPM
BASOPHILS # BLD AUTO: 0.04 X10(3) UL (ref 0–0.2)
BASOPHILS NFR BLD AUTO: 0.3 %
BILIRUB SERPL-MCNC: 0.3 MG/DL (ref 0.2–1.1)
BUN BLD-MCNC: 15 MG/DL (ref 9–23)
CALCIUM BLD-MCNC: 9.5 MG/DL (ref 8.7–10.6)
CHLORIDE SERPL-SCNC: 103 MMOL/L (ref 98–112)
CO2 SERPL-SCNC: 29 MMOL/L (ref 21–32)
CREAT BLD-MCNC: 0.93 MG/DL
EGFRCR SERPLBLD CKD-EPI 2021: 63 ML/MIN/1.73M2 (ref 60–?)
EOSINOPHIL # BLD AUTO: 0.28 X10(3) UL (ref 0–0.7)
EOSINOPHIL NFR BLD AUTO: 1.8 %
ERYTHROCYTE [DISTWIDTH] IN BLOOD BY AUTOMATED COUNT: 13.3 %
FLUAV + FLUBV RNA SPEC NAA+PROBE: NEGATIVE
FLUAV + FLUBV RNA SPEC NAA+PROBE: NEGATIVE
GLOBULIN PLAS-MCNC: 3.8 G/DL (ref 2–3.5)
GLUCOSE BLD-MCNC: 106 MG/DL (ref 70–99)
GLUCOSE BLD-MCNC: 93 MG/DL (ref 70–99)
HCT VFR BLD AUTO: 28.7 %
HGB BLD-MCNC: 9.2 G/DL
IMM GRANULOCYTES # BLD AUTO: 0.09 X10(3) UL (ref 0–1)
IMM GRANULOCYTES NFR BLD: 0.6 %
LACTATE SERPL-SCNC: 0.6 MMOL/L (ref 0.5–2)
LYMPHOCYTES # BLD AUTO: 6.07 X10(3) UL (ref 1–4)
LYMPHOCYTES NFR BLD AUTO: 40 %
MCH RBC QN AUTO: 27.5 PG (ref 26–34)
MCHC RBC AUTO-ENTMCNC: 32.1 G/DL (ref 31–37)
MCV RBC AUTO: 85.7 FL
MONOCYTES # BLD AUTO: 1.38 X10(3) UL (ref 0.1–1)
MONOCYTES NFR BLD AUTO: 9.1 %
NEUTROPHILS # BLD AUTO: 7.33 X10 (3) UL (ref 1.5–7.7)
NEUTROPHILS # BLD AUTO: 7.33 X10(3) UL (ref 1.5–7.7)
NEUTROPHILS NFR BLD AUTO: 48.2 %
NT-PROBNP SERPL-MCNC: 391 PG/ML (ref ?–450)
OSMOLALITY SERPL CALC.SUM OF ELEC: 295 MOSM/KG (ref 275–295)
P AXIS: 26 DEGREES
P-R INTERVAL: 152 MS
PLATELET # BLD AUTO: 466 10(3)UL (ref 150–450)
PLATELET MORPHOLOGY: NORMAL
POTASSIUM SERPL-SCNC: 4.2 MMOL/L (ref 3.5–5.1)
PROT SERPL-MCNC: 8 G/DL (ref 5.7–8.2)
Q-T INTERVAL: 378 MS
QRS DURATION: 102 MS
QTC CALCULATION (BEZET): 457 MS
R AXIS: 21 DEGREES
RBC # BLD AUTO: 3.35 X10(6)UL
RSV RNA SPEC NAA+PROBE: NEGATIVE
SARS-COV-2 RNA RESP QL NAA+PROBE: NOT DETECTED
SODIUM SERPL-SCNC: 142 MMOL/L (ref 136–145)
T AXIS: 22 DEGREES
VENTRICULAR RATE: 88 BPM
WBC # BLD AUTO: 15.2 X10(3) UL (ref 4–11)

## 2025-01-29 PROCEDURE — 99223 1ST HOSP IP/OBS HIGH 75: CPT | Performed by: HOSPITALIST

## 2025-01-29 PROCEDURE — 3E0F7SF INTRODUCTION OF OTHER GAS INTO RESPIRATORY TRACT, VIA NATURAL OR ARTIFICIAL OPENING: ICD-10-PCS | Performed by: EMERGENCY MEDICINE

## 2025-01-29 PROCEDURE — 71045 X-RAY EXAM CHEST 1 VIEW: CPT

## 2025-01-29 PROCEDURE — 73630 X-RAY EXAM OF FOOT: CPT

## 2025-01-29 RX ORDER — AMLODIPINE BESYLATE 2.5 MG/1
2.5 TABLET ORAL DAILY
Status: DISCONTINUED | OUTPATIENT
Start: 2025-01-30 | End: 2025-01-31

## 2025-01-29 RX ORDER — NICOTINE POLACRILEX 4 MG
30 LOZENGE BUCCAL
Status: DISCONTINUED | OUTPATIENT
Start: 2025-01-29 | End: 2025-01-31

## 2025-01-29 RX ORDER — PANTOPRAZOLE SODIUM 40 MG/1
40 TABLET, DELAYED RELEASE ORAL
Status: DISCONTINUED | OUTPATIENT
Start: 2025-01-30 | End: 2025-01-31

## 2025-01-29 RX ORDER — TIZANIDINE 2 MG/1
2 TABLET ORAL 3 TIMES DAILY PRN
COMMUNITY

## 2025-01-29 RX ORDER — BUDESONIDE AND FORMOTEROL FUMARATE DIHYDRATE 160; 4.5 UG/1; UG/1
2 AEROSOL RESPIRATORY (INHALATION) 2 TIMES DAILY PRN
COMMUNITY

## 2025-01-29 RX ORDER — BENZONATATE 200 MG/1
200 CAPSULE ORAL 3 TIMES DAILY PRN
Status: DISCONTINUED | OUTPATIENT
Start: 2025-01-29 | End: 2025-01-31

## 2025-01-29 RX ORDER — METFORMIN HYDROCHLORIDE EXTENDED-RELEASE TABLETS 500 MG/1
500 TABLET, FILM COATED, EXTENDED RELEASE ORAL
COMMUNITY

## 2025-01-29 RX ORDER — ONDANSETRON 4 MG/1
4 TABLET, ORALLY DISINTEGRATING ORAL EVERY 8 HOURS PRN
Status: DISCONTINUED | OUTPATIENT
Start: 2025-01-29 | End: 2025-01-31

## 2025-01-29 RX ORDER — BISACODYL 10 MG
10 SUPPOSITORY, RECTAL RECTAL
Status: DISCONTINUED | OUTPATIENT
Start: 2025-01-29 | End: 2025-01-31

## 2025-01-29 RX ORDER — ENOXAPARIN SODIUM 100 MG/ML
40 INJECTION SUBCUTANEOUS DAILY
Status: DISCONTINUED | OUTPATIENT
Start: 2025-01-30 | End: 2025-01-31

## 2025-01-29 RX ORDER — SODIUM CHLORIDE 9 MG/ML
125 INJECTION, SOLUTION INTRAVENOUS CONTINUOUS
Status: DISCONTINUED | OUTPATIENT
Start: 2025-01-29 | End: 2025-01-30

## 2025-01-29 RX ORDER — NICOTINE POLACRILEX 4 MG
15 LOZENGE BUCCAL
Status: DISCONTINUED | OUTPATIENT
Start: 2025-01-29 | End: 2025-01-31

## 2025-01-29 RX ORDER — ATORVASTATIN CALCIUM 10 MG/1
10 TABLET, FILM COATED ORAL NIGHTLY
Status: DISCONTINUED | OUTPATIENT
Start: 2025-01-29 | End: 2025-01-31

## 2025-01-29 RX ORDER — ALBUTEROL SULFATE 90 UG/1
2 INHALANT RESPIRATORY (INHALATION) EVERY 4 HOURS PRN
Status: DISCONTINUED | OUTPATIENT
Start: 2025-01-29 | End: 2025-01-31

## 2025-01-29 RX ORDER — HYDROCODONE BITARTRATE AND ACETAMINOPHEN 10; 325 MG/1; MG/1
1 TABLET ORAL ONCE
Status: COMPLETED | OUTPATIENT
Start: 2025-01-29 | End: 2025-01-29

## 2025-01-29 RX ORDER — AMLODIPINE BESYLATE 2.5 MG/1
2.5 TABLET ORAL DAILY
COMMUNITY
Start: 2024-11-21

## 2025-01-29 RX ORDER — IPRATROPIUM BROMIDE AND ALBUTEROL SULFATE 2.5; .5 MG/3ML; MG/3ML
3 SOLUTION RESPIRATORY (INHALATION) EVERY 6 HOURS PRN
Status: DISCONTINUED | OUTPATIENT
Start: 2025-01-29 | End: 2025-01-31

## 2025-01-29 RX ORDER — IBUPROFEN 600 MG/1
600 TABLET, FILM COATED ORAL ONCE
Status: DISCONTINUED | OUTPATIENT
Start: 2025-01-29 | End: 2025-01-29

## 2025-01-29 RX ORDER — POLYETHYLENE GLYCOL 3350 17 G/17G
17 POWDER, FOR SOLUTION ORAL DAILY PRN
Status: DISCONTINUED | OUTPATIENT
Start: 2025-01-29 | End: 2025-01-31

## 2025-01-29 RX ORDER — PANTOPRAZOLE SODIUM 40 MG/1
40 TABLET, DELAYED RELEASE ORAL
COMMUNITY

## 2025-01-29 RX ORDER — IPRATROPIUM BROMIDE AND ALBUTEROL SULFATE 2.5; .5 MG/3ML; MG/3ML
3 SOLUTION RESPIRATORY (INHALATION) 4 TIMES DAILY PRN
Status: DISCONTINUED | OUTPATIENT
Start: 2025-01-29 | End: 2025-01-31

## 2025-01-29 RX ORDER — ONDANSETRON 2 MG/ML
4 INJECTION INTRAMUSCULAR; INTRAVENOUS EVERY 6 HOURS PRN
Status: DISCONTINUED | OUTPATIENT
Start: 2025-01-29 | End: 2025-01-31

## 2025-01-29 RX ORDER — ACETAMINOPHEN 500 MG
500 TABLET ORAL EVERY 4 HOURS PRN
Status: DISCONTINUED | OUTPATIENT
Start: 2025-01-29 | End: 2025-01-31

## 2025-01-29 RX ORDER — LOSARTAN POTASSIUM 100 MG/1
100 TABLET ORAL DAILY
Status: DISCONTINUED | OUTPATIENT
Start: 2025-01-30 | End: 2025-01-31

## 2025-01-29 RX ORDER — METOPROLOL TARTRATE 25 MG/1
25 TABLET, FILM COATED ORAL
Status: DISCONTINUED | OUTPATIENT
Start: 2025-01-29 | End: 2025-01-31

## 2025-01-29 RX ORDER — METOCLOPRAMIDE HYDROCHLORIDE 5 MG/ML
5 INJECTION INTRAMUSCULAR; INTRAVENOUS EVERY 8 HOURS PRN
Status: DISCONTINUED | OUTPATIENT
Start: 2025-01-29 | End: 2025-01-31

## 2025-01-29 RX ORDER — FLUTICASONE PROPIONATE AND SALMETEROL 100; 50 UG/1; UG/1
1 POWDER RESPIRATORY (INHALATION) 2 TIMES DAILY
Status: DISCONTINUED | OUTPATIENT
Start: 2025-01-29 | End: 2025-01-31

## 2025-01-29 RX ORDER — BENZONATATE 200 MG/1
200 CAPSULE ORAL 3 TIMES DAILY PRN
Status: DISCONTINUED | OUTPATIENT
Start: 2025-01-29 | End: 2025-01-29

## 2025-01-29 RX ORDER — AZITHROMYCIN 250 MG/1
500 TABLET, FILM COATED ORAL DAILY
Status: COMPLETED | OUTPATIENT
Start: 2025-01-30 | End: 2025-01-31

## 2025-01-29 RX ORDER — TIZANIDINE 2 MG/1
2 TABLET ORAL 3 TIMES DAILY PRN
Status: DISCONTINUED | OUTPATIENT
Start: 2025-01-29 | End: 2025-01-31

## 2025-01-29 RX ORDER — HYDROCODONE BITARTRATE AND ACETAMINOPHEN 10; 325 MG/1; MG/1
1 TABLET ORAL EVERY 6 HOURS PRN
Status: DISCONTINUED | OUTPATIENT
Start: 2025-01-29 | End: 2025-01-30

## 2025-01-29 RX ORDER — BENZONATATE 200 MG/1
200 CAPSULE ORAL 3 TIMES DAILY PRN
COMMUNITY

## 2025-01-29 RX ORDER — SODIUM PHOSPHATE, DIBASIC AND SODIUM PHOSPHATE, MONOBASIC 7; 19 G/230ML; G/230ML
1 ENEMA RECTAL ONCE AS NEEDED
Status: DISCONTINUED | OUTPATIENT
Start: 2025-01-29 | End: 2025-01-31

## 2025-01-29 RX ORDER — SENNOSIDES 8.6 MG
17.2 TABLET ORAL NIGHTLY PRN
Status: DISCONTINUED | OUTPATIENT
Start: 2025-01-29 | End: 2025-01-31

## 2025-01-29 RX ORDER — DEXTROSE MONOHYDRATE 25 G/50ML
50 INJECTION, SOLUTION INTRAVENOUS
Status: DISCONTINUED | OUTPATIENT
Start: 2025-01-29 | End: 2025-01-31

## 2025-01-29 NOTE — ED QUICK NOTES
[facility] Emergency Services  [address] [place] [address]  Phone: [phone]    Name:  Daryl Ovalle  Current Date: 2017  : 1983  MRN: [id number]   CHANEL: [id number]    Visit Date: 2017  Address:   [place] [address]  Phone: [phone]    Primary Care Provider     Name: No Pcp    Phone: None    The staff of [facility] would like to thank you for allowing us to assist you with your healthcare needs. The following includes patient education materials and information on how best to care for your illness/injury at home and when to see a physician. If you need to locate a Doctor or clinic close to you, please call the Doctor Referral Service at [phone]. The Service is available Monday through Thursday from 8 AM to 8 PM and  from 8 AM to 4 PM.    Patients Please Note: If further time off is required, or a medical clearance to return to work is required, it must be obtained through your primary physician.  Return to work clearances and extensions of \"Time-Off\" will not be given by the Emergency Department.     We hope that you leave our Emergency Department believing that we provided you with very good care.   Your Opinion Matters To Us  If you receive a patient satisfaction survey in the mail, please complete and return it in the postage-paid envelope.  We truly value and appreciate your feedback.  Emergency Department Care Providers   Physician:No att. providers found    Advanced Practice Provider:  Physician Assistant: Maxine Stahl PA-C RN_________________ ED Tech__________________ Clerical_________________         Orders for admission, patient is aware of plan and ready to go upstairs. Any questions, please call ED RN trevon at extension [phone].     Patient Covid vaccination status: Fully vaccinated     COVID Test Ordered in ED: SARS-CoV-2/Flu A and B/RSV by PCR (GeneXpert)    COVID Suspicion at Admission: N/A    Running Infusions:    sodium chloride 125 mL/hr (01/29/25 1875)        Mental Status/LOC at time of transport: a/ox4, on 2l NC which is baseline. Pt is a very hard stick. PIV was done by an US.    Other pertinent information:   CIWA score: N/A   NIH score:  N/A

## 2025-01-29 NOTE — ED INITIAL ASSESSMENT (HPI)
Patient to ED with daughter, Reports she has a green, productive cough,  and increased SOB.  ? Fevers.    Family member is positive for the flu   Patient also dropped her oxygen tank on her left foot a few days ago.  Pain to top of foot.  No redness or swelling noted at this time.

## 2025-01-29 NOTE — ED PROVIDER NOTES
Patient Seen in: [facility] Emergency Department      History     Chief Complaint   Patient presents with    Cough    Foot Injury     Stated Complaint: cough with green phlegm, on oxygen at all times    Subjective:   HPI      80-year-old female comes to the hospital complaint of having difficulty with a cough over the last 3 days.  She said green phlegm production.  She has been increasing her oxygen which is chronic.  She has a history of having sarcoidosis that an endobronchial stent put in place.  She has had pneumonia in the past.  She patient's had fevers.  She has had some bodyaches and chills.  She denies any headaches or dizziness.  She is no pain in her chest or abdominal pains.'s been no vomiting or diarrhea.  The patient also complains of having some discomfort by the area of her left foot that she dropped an oxygen bottle on last week that is bruised and swollen.  She has no numbness or weakness.  She is able to ambulate she is no other complaints at this time.    Objective:     No pertinent past medical history.            No pertinent past surgical history.              No pertinent social history.                Physical Exam     ED Triage Vitals [01/29/25 1138]   /73   Pulse 71   Resp 22   Temp 98.2 °F (36.8 °C)   Temp src Oral   SpO2 95 %   O2 Device Nasal cannula       Current Vitals:   Vital Signs  BP: (!) 110/97  Pulse: 98  Resp: 24  Temp: 98.2 °F (36.8 °C)  Temp src: Oral  MAP (mmHg): (!) 102    Oxygen Therapy  SpO2: 100 %  O2 Device: Nasal cannula  O2 Flow Rate (L/min): 2 L/min        Physical Exam  HEENT : NCAT, EOMI, PEERL,  neck supple, no JVD, trachea midline, No LAD  Heart: S1S2 normal. No murmurs, regular rate and rhythm  Lungs:  diminished lung sounds noted to the right lung greatest in the lower region  Abdomen: Soft nontender nondistended normal active bowel sounds without rebound, guarding or masses noted  Back nontender without CVA tenderness  Extremity there is some  bruising and ecchymosis present tenderness over the left foot without deformity noted and neurovascularly intact  Neuro: No focal deficits noted    All measures to prevent infection transmission during my interaction with the patient were taken.  The patient was already wearing droplet mask on my arrival to the room.  Personal protective equipment including a droplet mask as well as gloves were worn throughout the duration of my exam.  Hand washing was performed prior to and after the exam.  Stethoscope and equipment used during my examination was cleaned with a super Sani cloth germicidal wipe following the exam.    ED Course     Labs Reviewed   COMP METABOLIC PANEL (14) - Abnormal; Notable for the following components:       Result Value    ALT 8 (*)     Globulin  3.8 (*)     All other components within normal limits   CBC WITH DIFFERENTIAL WITH PLATELET - Abnormal; Notable for the following components:    WBC 15.2 (*)     RBC 3.35 (*)     HGB 9.2 (*)     HCT 28.7 (*)     .0 (*)     All other components within normal limits   LACTIC ACID, PLASMA - Normal   PRO BETA NATRIURETIC PEPTIDE - Normal   SARS-COV-2/FLU A AND B/RSV BY PCR (GENEXPERT) - Normal    Narrative:     This test is intended for the qualitative detection and differentiation of SARS-CoV-2, influenza A, influenza B, and respiratory syncytial virus (RSV) viral RNA in nasopharyngeal or nares swabs from individuals suspected of respiratory viral infection consistent with COVID-19 by their healthcare provider. Signs and symptoms of respiratory viral infection due to SARS-CoV-2, influenza, and RSV can be similar.    Test performed using the Xpert Xpress SARS-CoV-2/FLU/RSV (real time RT-PCR)  assay on the GeneXpert instrument, [employer], [place] [address].   This test is being used under the Food and Drug Administration's Emergency Use Authorization.    The authorized Fact Sheet for Healthcare Providers for this assay is available upon request from  the laboratory.   SCAN SLIDE   BLOOD CULTURE   BLOOD CULTURE     EKG    Rate, intervals and axes as noted on EKG Report.  Rate: 88  Rhythm: Sinus Rhythm  Reading: , , patient is a normal sinus rhythm without ischemic change           ED Course as of 01/29/25 1633  ------------------------------------------------------------  Time: 01/29 1632  Comment: While therapy the patient had a chest ray done showing significant pneumonia with effusion on the right by my interpretation.  Read the radiology report as well.  Patient's foot film was negative.  Blood cultures x 2 were taken.  Lactic acid level was negative.  The patient's white count is 15.2 thousand.  Her lites were unremarkable.  Her COVID influenza and RSV were negative.  The patient was given IV Rocephin and azithromycin will be admitted the hospital for further management.       XR FOOT, COMPLETE (MIN 3 VIEWS), LEFT (CPT=73630)    Result Date: 1/29/2025  PROCEDURE:  XR FOOT, COMPLETE (MIN 3 VIEWS), LEFT (CPT=73630)  TECHNIQUE:  AP, oblique, and lateral views were obtained.  COMPARISON:  None.  INDICATIONS:  cough with green phlegm, on oxygen at all times, dropped oxygen canister on foot, sat 100% upon arrival  PATIENT STATED HISTORY: (As transcribed by Technologist)  Patient states cough for one week and is coughing up green mucus. Patient shares her oxygen tank fell on top of her left foot a couple days ago. Had swelling and bruising to left foot.               CONCLUSION:  No acute fracture or dislocation left foot.  There is joint space narrowing 1st through 5th IP joints and 1st MTP joint.   LOCATION:  [id number]   Dictated by (CST): Sandra Howard MD on 1/29/2025 at 12:36 PM     Finalized by (CST): Sandra Howard MD on 1/29/2025 at 12:37 PM       XR CHEST AP PORTABLE  (CPT=71045)    Result Date: 1/29/2025  PROCEDURE:  XR CHEST AP PORTABLE  (CPT=71045)  TECHNIQUE:  AP chest radiograph was obtained.  COMPARISON:  [facility] , XR, XR CHEST PA + LAT  CHEST (CPT=71046), 9/09/2024, 11:15 AM.  INDICATIONS:  cough with green phlegm, on oxygen at all times, dropped oxygen canister on foot, sat 100% upon arrival  PATIENT STATED HISTORY: (As transcribed by Technologist)  Patient states cough for one week and is coughing up green mucus. Patient shares her oxygen tank fell on top of her left foot a couple days ago. Had swelling and bruising to left foot.              CONCLUSION:  There is dense consolidation and effusion in the right midlung and lung base.  This is concerning for pneumonia.  Prominent interstitial markings in the right upper lobe and left midlung and lung base.  Stable appearing heart size.   LOCATION:  [id number]      Dictated by (CST): Sandra Howard MD on 1/29/2025 at 12:35 PM     Finalized by (CST): Sandra Howard MD on 1/29/2025 at 12:36 PM        Medications   sodium chloride 0.9% infusion (125 mL/hr Intravenous New Bag 1/29/25 1545)   azithromycin (Zithromax) 500 mg in sodium chloride 0.9% 250mL IVPB premix (has no administration in time range)   cefTRIAXone (Rocephin) 2 g in sodium chloride 0.9% 100 mL IVPB-ADDV (2 g Intravenous New Bag 1/29/25 1545)            MDM      Differential diagnosis included pneumonia, COVID, influenza, RSV but limited such.  Patient has pneumonia.  He is oxygen dependent.  She has an endobronchial stent and at this time is felt she will should be placed in the hospital for IV antibiotics.  The hospitalist been notified.      This note was prepared using Dragon Medical voice recognition dictation software.  As a result errors may occur.  When identified to these areas have been corrected.  While every attempt is made to correct errors during dictation discrepancies may still exist.  Please contact if there are any errors.    Admission disposition: 1/29/2025  4:33 PM           Medical Decision Making      Disposition and Plan     Clinical Impression:  1. Community acquired pneumonia of right lung, unspecified part of lung          Disposition:  Admit  1/29/2025  4:33 pm    Follow-up:  No follow-up provider specified.        Medications Prescribed:  Current Discharge Medication List              Supplementary Documentation:         Hospital Problems       Present on Admission  Date Reviewed: 1/18/2024            ICD-10-CM Noted POA    * (Principal) Community acquired pneumonia of right lung, unspecified part of lung J18.9 1/29/2025 Unknown

## 2025-01-30 ENCOUNTER — APPOINTMENT (OUTPATIENT)
Dept: CT IMAGING | Facility: HOSPITAL | Age: 80
End: 2025-01-30
Attending: INTERNAL MEDICINE
Payer: MEDICARE

## 2025-01-30 PROBLEM — J96.21 ACUTE ON CHRONIC RESPIRATORY FAILURE WITH HYPOXIA (HCC): Status: ACTIVE | Noted: 2025-01-30

## 2025-01-30 PROBLEM — Z71.89 COUNSELING REGARDING ADVANCE CARE PLANNING AND GOALS OF CARE: Status: ACTIVE | Noted: 2025-01-30

## 2025-01-30 PROBLEM — J44.1 COPD EXACERBATION (HCC): Status: ACTIVE | Noted: 2025-01-30

## 2025-01-30 PROBLEM — Z51.5 PALLIATIVE CARE ENCOUNTER: Status: ACTIVE | Noted: 2025-01-30

## 2025-01-30 PROBLEM — E11.9 TYPE 2 DIABETES MELLITUS WITHOUT COMPLICATION, WITHOUT LONG-TERM CURRENT USE OF INSULIN (HCC): Status: ACTIVE | Noted: 2025-01-30

## 2025-01-30 PROBLEM — J96.11 CHRONIC HYPOXIC RESPIRATORY FAILURE (HCC): Status: ACTIVE | Noted: 2025-01-30

## 2025-01-30 PROBLEM — D86.0 PULMONARY SARCOIDOSIS (HCC): Status: ACTIVE | Noted: 2025-01-30

## 2025-01-30 PROBLEM — J98.09 BRONCHIAL STENOSIS: Status: ACTIVE | Noted: 2025-01-30

## 2025-01-30 LAB
ANION GAP SERPL CALC-SCNC: 4 MMOL/L (ref 0–18)
BASOPHILS # BLD AUTO: 0.05 X10(3) UL (ref 0–0.2)
BASOPHILS NFR BLD AUTO: 0.4 %
BUN BLD-MCNC: 10 MG/DL (ref 9–23)
CALCIUM BLD-MCNC: 10 MG/DL (ref 8.7–10.6)
CHLORIDE SERPL-SCNC: 105 MMOL/L (ref 98–112)
CO2 SERPL-SCNC: 28 MMOL/L (ref 21–32)
CREAT BLD-MCNC: 0.84 MG/DL
DEPRECATED HBV CORE AB SER IA-ACNC: 150 NG/ML
EGFRCR SERPLBLD CKD-EPI 2021: 71 ML/MIN/1.73M2 (ref 60–?)
EOSINOPHIL # BLD AUTO: 0.09 X10(3) UL (ref 0–0.7)
EOSINOPHIL NFR BLD AUTO: 0.6 %
ERYTHROCYTE [DISTWIDTH] IN BLOOD BY AUTOMATED COUNT: 13.4 %
GLUCOSE BLD-MCNC: 100 MG/DL (ref 70–99)
GLUCOSE BLD-MCNC: 102 MG/DL (ref 70–99)
GLUCOSE BLD-MCNC: 103 MG/DL (ref 70–99)
GLUCOSE BLD-MCNC: 106 MG/DL (ref 70–99)
GLUCOSE BLD-MCNC: 112 MG/DL (ref 70–99)
HCT VFR BLD AUTO: 33.9 %
HGB BLD-MCNC: 10.5 G/DL
IMM GRANULOCYTES # BLD AUTO: 0.1 X10(3) UL (ref 0–1)
IMM GRANULOCYTES NFR BLD: 0.7 %
L PNEUMO AG UR QL: NEGATIVE
LYMPHOCYTES # BLD AUTO: 3.71 X10(3) UL (ref 1–4)
LYMPHOCYTES NFR BLD AUTO: 26.5 %
MCH RBC QN AUTO: 27.2 PG (ref 26–34)
MCHC RBC AUTO-ENTMCNC: 31 G/DL (ref 31–37)
MCV RBC AUTO: 87.8 FL
MONOCYTES # BLD AUTO: 0.98 X10(3) UL (ref 0.1–1)
MONOCYTES NFR BLD AUTO: 7 %
NEUTROPHILS # BLD AUTO: 9.06 X10 (3) UL (ref 1.5–7.7)
NEUTROPHILS # BLD AUTO: 9.06 X10(3) UL (ref 1.5–7.7)
NEUTROPHILS NFR BLD AUTO: 64.8 %
OSMOLALITY SERPL CALC.SUM OF ELEC: 283 MOSM/KG (ref 275–295)
PLATELET # BLD AUTO: 474 10(3)UL (ref 150–450)
POTASSIUM SERPL-SCNC: 4.1 MMOL/L (ref 3.5–5.1)
RBC # BLD AUTO: 3.86 X10(6)UL
SODIUM SERPL-SCNC: 137 MMOL/L (ref 136–145)
WBC # BLD AUTO: 14 X10(3) UL (ref 4–11)

## 2025-01-30 PROCEDURE — 99221 1ST HOSP IP/OBS SF/LOW 40: CPT | Performed by: NURSE PRACTITIONER

## 2025-01-30 PROCEDURE — 99223 1ST HOSP IP/OBS HIGH 75: CPT | Performed by: INTERNAL MEDICINE

## 2025-01-30 PROCEDURE — 71275 CT ANGIOGRAPHY CHEST: CPT | Performed by: INTERNAL MEDICINE

## 2025-01-30 PROCEDURE — 99232 SBSQ HOSP IP/OBS MODERATE 35: CPT | Performed by: INTERNAL MEDICINE

## 2025-01-30 RX ORDER — HYDROCODONE BITARTRATE AND ACETAMINOPHEN 10; 325 MG/1; MG/1
1 TABLET ORAL EVERY 4 HOURS PRN
Status: DISCONTINUED | OUTPATIENT
Start: 2025-01-30 | End: 2025-01-31

## 2025-01-30 NOTE — PLAN OF CARE
Patient is A&O4. SBA w/walker.Carb controlled diet; accuchecks QID. Continent x2. IV & PO antibiotics. Lovenox subcutaneous. C/o back pain, norco given. Patient updated on POC. No further needs at this time.       Problem: Patient/Family Goals  Goal: Patient/Family Long Term Goal  Description: Patient's Long Term Goal: Discharge with adequate resources     Interventions:  - See additional Care Plan goals for specific interventions  Outcome: Progressing  Goal: Patient/Family Short Term Goal  Description: Patient's Short Term Goal:   1/29 NOC: pain management    Interventions:   -   - See additional Care Plan goals for specific interventions  Outcome: Progressing

## 2025-01-30 NOTE — CONSULTS
[facility]   part of [facility]  Palliative Care Initial Consult    Lisbet Velarde Patient Status:  Inpatient    1945 MRN [id number]   Location [facility] 5NW-A Attending Marylin Mcginnis MD   Hosp Day # 1 PCP Mumtaz Malone MD   [id number]-[id number]     Date of Consult: 25   Today is day #1 of hospital stay.     Palliative care consultation was requested by Dr. Urrutia for evaluation of palliative care needs and support with Goals of care discussion;Uncontrolled symptoms;Advance care planning / HPOA;Establish palliative care.    History of Present Illness:        Lisbet Velarde is a 80 year old female with PMH significant for HF, COPD, sarcoidosis (2L home O2), bronchial stenosis s/p endobronchial stent , GERD, HLD in addition to the other conditions noted below, presented to ED on 2025 with CC of WOB productive cough and injury following O2 tank that was dropped on her foot at home.  Initial workup revealed new infiltrates and possible R effusion, PNA and patient is also undergoing evaluation and treatment for bronchiectasis, acute COPD exacerbation w chronic hypoxic respiraotroy failure, pulmonary sarcoisosis, anemia.    Reviewed [facility] d/w pulm service surrounding thora, chest tubes or bronchoscopy.       This is the 2nd hospitalization in the past 6 months.    Medical History: obtained from [facility]  Past Medical History:    Arthritis    Bronchial obstruction s/p right mainstem stent    Congestive heart disease (HCC)    COPD (chronic obstructive pulmonary disease) (HCC)    Diabetes mellitus (HCC)    Esophageal reflux    Hearing loss    HTN (hypertension)    Hyperlipidemia    Osteoarthritis    Sarcoidosis     Past Surgical History:   Procedure Laterality Date    Appendectomy      Hysterectomy      Joaquin biopsy stereo nodule 2 site bilat (cpt=19081/13656)          Removal of lung,lobectomy      Tonsillectomy      Total abdom hysterectomy          Allergies:  Allergies[1]    Medications:     Current Facility-Administered Medications:     sodium chloride 0.9% infusion, 125 mL/hr, Intravenous, Continuous    albuterol (Ventolin HFA) 108 (90 Base) MCG/ACT inhaler 2 puff, 2 puff, Inhalation, Q4H PRN    amLODIPine (Norvasc) tab 2.5 mg, 2.5 mg, Oral, Daily    atorvastatin (Lipitor) tab 10 mg, 10 mg, Oral, Nightly    benzonatate (Tessalon) cap 200 mg, 200 mg, Oral, TID PRN    fluticasone-salmeterol (Advair Diskus) 100-50 MCG/ACT inhaler 1 puff, 1 puff, Inhalation, BID    HYDROcodone-acetaminophen (Norco)  MG per tab 1 tablet, 1 tablet, Oral, Q6H PRN    ipratropium-albuterol (Duoneb) 0.5-2.5 (3) MG/3ML inhalation solution 3 mL, 3 mL, Nebulization, QID PRN    losartan (Cozaar) tab 100 mg, 100 mg, Oral, Daily    metoprolol tartrate (Lopressor) tab 25 mg, 25 mg, Oral, 2x Daily(Beta Blocker)    ondansetron (Zofran-ODT) disintegrating tab 4 mg, 4 mg, Oral, Q8H PRN    pantoprazole (Protonix) DR tab 40 mg, 40 mg, Oral, QAM AC    tiZANidine (Zanaflex) tab 2 mg, 2 mg, Oral, TID PRN    glucose (Dex4) 15 GM/59ML oral liquid 15 g, 15 g, Oral, Q15 Min PRN **OR** glucose (Glutose) 40% oral gel 15 g, 15 g, Oral, Q15 Min PRN **OR** glucose-vitamin C (Dex-4) chewable tab 4 tablet, 4 tablet, Oral, Q15 Min PRN **OR** dextrose 50% injection 50 mL, 50 mL, Intravenous, Q15 Min PRN **OR** glucose (Dex4) 15 GM/59ML oral liquid 30 g, 30 g, Oral, Q15 Min PRN **OR** glucose (Glutose) 40% oral gel 30 g, 30 g, Oral, Q15 Min PRN **OR** glucose-vitamin C (Dex-4) chewable tab 8 tablet, 8 tablet, Oral, Q15 Min PRN    enoxaparin (Lovenox) 40 MG/0.4ML SUBQ injection 40 mg, 40 mg, Subcutaneous, Daily    acetaminophen (Tylenol Extra Strength) tab 500 mg, 500 mg, Oral, Q4H PRN    ondansetron (Zofran) 4 MG/2ML injection 4 mg, 4 mg, Intravenous, Q6H PRN    metoclopramide (Reglan) 5 mg/mL injection 5 mg, 5 mg, Intravenous, Q8H PRN    polyethylene glycol (PEG 3350) (Miralax) 17 g oral packet 17 g,  17 g, Oral, Daily PRN    sennosides (Senokot) tab 17.2 mg, 17.2 mg, Oral, Nightly PRN    bisacodyl (Dulcolax) 10 MG rectal suppository 10 mg, 10 mg, Rectal, Daily PRN    fleet enema (Fleet) rectal enema 133 mL, 1 enema, Rectal, Once PRN    insulin aspart (NovoLOG) 100 Units/mL FlexPen 1-10 Units, 1-10 Units, Subcutaneous, TID AC and HS    azithromycin (Zithromax) tab 500 mg, 500 mg, Oral, Daily    cefTRIAXone (Rocephin) 2 g in sodium chloride 0.9% 100 mL IVPB-ADDV, 2 g, Intravenous, Q24H    ipratropium-albuterol (Duoneb) 0.5-2.5 (3) MG/3ML inhalation solution 3 mL, 3 mL, Nebulization, Q6H PRN  No current outpatient medications on file.       Functional Status History:  ADLs: Independent  (Bathing or showering, dressing, getting in and out of bed or chair, walking, using the toilet and eating)  IADLs: Independent  (Use the phone, shop for groceries, meal preparation, manage medicines, clean living area, use transportation by self, manage money)      Palliative Care Social History:   Marital Status:   Children: Yes and grandchildren  Living Situation Prior to Admit: Home  Does Patient Live Alone: deferred  Is Patient Confused: No  Occupational History: Retired surgical nurse    Social History     Socioeconomic History    Marital status:    Tobacco Use    Smoking status: Never    Smokeless tobacco: Never   Substance and Sexual Activity    Alcohol use: Never    Drug use: Never       Substance History:   reports that she has never smoked. She has never used smokeless tobacco.  reports no history of alcohol use.  reports no history of drug use.      Spiritual Assessment:   Anabaptist - Parish Not Listed    HPI obtained from [facility] and Lisbet.    SUBJECTIVE  Review of Systems - Palliative Care Symptom Assessment     I visited Lisbet without visitors at bedside. She was preparing to go for CT chest, so discussion was cursory and limited. She accepted follow up tomorrow for more comprehensive discussion of her  symptoms and wishes.         Pain:    24 hour pain requirements (1152-0125):  Norco-10 x4 doses  Current pain: 6 / 10, described as aching, located R-chest. Norco has been helping, but she feels she needs dose sooner than 6 hours but is told she has to wait another hour for dose to be due. She tends to take it q6hr prn at home, but some days she doesn't need it at all. She knows pain is worse now because of how frequently she has required it. We dicussed considering making it available q4hr prn here and she will consider and let us know.    Dyspnea: + we discussed palliative's role in supporting w this symptom management as well, and she will consider  Cough: at times  Nausea: deferred  Appetite: deferred  Constipation: deferred  Last BM:    Bowel Movement         1/30/2025  0800             Stool Count Calculated for I/O: 1          OBJECTIVE       Medications:    Current Facility-Administered Medications:     sodium chloride 0.9% infusion, 125 mL/hr, Intravenous, Continuous    albuterol (Ventolin HFA) 108 (90 Base) MCG/ACT inhaler 2 puff, 2 puff, Inhalation, Q4H PRN    amLODIPine (Norvasc) tab 2.5 mg, 2.5 mg, Oral, Daily    atorvastatin (Lipitor) tab 10 mg, 10 mg, Oral, Nightly    benzonatate (Tessalon) cap 200 mg, 200 mg, Oral, TID PRN    fluticasone-salmeterol (Advair Diskus) 100-50 MCG/ACT inhaler 1 puff, 1 puff, Inhalation, BID    HYDROcodone-acetaminophen (Norco)  MG per tab 1 tablet, 1 tablet, Oral, Q6H PRN    ipratropium-albuterol (Duoneb) 0.5-2.5 (3) MG/3ML inhalation solution 3 mL, 3 mL, Nebulization, QID PRN    losartan (Cozaar) tab 100 mg, 100 mg, Oral, Daily    metoprolol tartrate (Lopressor) tab 25 mg, 25 mg, Oral, 2x Daily(Beta Blocker)    ondansetron (Zofran-ODT) disintegrating tab 4 mg, 4 mg, Oral, Q8H PRN    pantoprazole (Protonix) DR tab 40 mg, 40 mg, Oral, QAM AC    tiZANidine (Zanaflex) tab 2 mg, 2 mg, Oral, TID PRN    glucose (Dex4) 15 GM/59ML oral liquid 15 g, 15 g, Oral, Q15 Min PRN  **OR** glucose (Glutose) 40% oral gel 15 g, 15 g, Oral, Q15 Min PRN **OR** glucose-vitamin C (Dex-4) chewable tab 4 tablet, 4 tablet, Oral, Q15 Min PRN **OR** dextrose 50% injection 50 mL, 50 mL, Intravenous, Q15 Min PRN **OR** glucose (Dex4) 15 GM/59ML oral liquid 30 g, 30 g, Oral, Q15 Min PRN **OR** glucose (Glutose) 40% oral gel 30 g, 30 g, Oral, Q15 Min PRN **OR** glucose-vitamin C (Dex-4) chewable tab 8 tablet, 8 tablet, Oral, Q15 Min PRN    enoxaparin (Lovenox) 40 MG/0.4ML SUBQ injection 40 mg, 40 mg, Subcutaneous, Daily    acetaminophen (Tylenol Extra Strength) tab 500 mg, 500 mg, Oral, Q4H PRN    ondansetron (Zofran) 4 MG/2ML injection 4 mg, 4 mg, Intravenous, Q6H PRN    metoclopramide (Reglan) 5 mg/mL injection 5 mg, 5 mg, Intravenous, Q8H PRN    polyethylene glycol (PEG 3350) (Miralax) 17 g oral packet 17 g, 17 g, Oral, Daily PRN    sennosides (Senokot) tab 17.2 mg, 17.2 mg, Oral, Nightly PRN    bisacodyl (Dulcolax) 10 MG rectal suppository 10 mg, 10 mg, Rectal, Daily PRN    fleet enema (Fleet) rectal enema 133 mL, 1 enema, Rectal, Once PRN    insulin aspart (NovoLOG) 100 Units/mL FlexPen 1-10 Units, 1-10 Units, Subcutaneous, TID AC and HS    azithromycin (Zithromax) tab 500 mg, 500 mg, Oral, Daily    cefTRIAXone (Rocephin) 2 g in sodium chloride 0.9% 100 mL IVPB-ADDV, 2 g, Intravenous, Q24H    ipratropium-albuterol (Duoneb) 0.5-2.5 (3) MG/3ML inhalation solution 3 mL, 3 mL, Nebulization, Q6H PRN    Hematology:   Lab Results   Component Value Date    WBC 14.0 (H) 01/30/2025    HGB 10.5 (L) 01/30/2025    HCT 33.9 (L) 01/30/2025    .0 (H) 01/30/2025       Chemistry:  Lab Results   Component Value Date    CREATSERUM 0.84 01/30/2025    BUN 10 01/30/2025     01/30/2025    K 4.1 01/30/2025     01/30/2025    CO2 28.0 01/30/2025     (H) 01/30/2025    CA 10.0 01/30/2025    ALB 4.2 01/29/2025    ALKPHO 132 01/29/2025    BILT 0.3 01/29/2025    TP 8.0 01/29/2025    AST 17 01/29/2025    ALT 8  (L) 01/29/2025    DDIMER 0.85 (H) 11/11/2022    MG 2.8 (H) 11/13/2022    PHOS 3.7 02/05/2019    TROP <0.045 12/17/2020       Imaging:  XR FOOT, COMPLETE (MIN 3 VIEWS), LEFT (CPT=73630)    Result Date: 1/29/2025  CONCLUSION:  No acute fracture or dislocation left foot.  There is joint space narrowing 1st through 5th IP joints and 1st MTP joint.   LOCATION:  [id number]   Dictated by (CST): Sandra Howard MD on 1/29/2025 at 12:36 PM     Finalized by (CST): Sandra Howard MD on 1/29/2025 at 12:37 PM       XR CHEST AP PORTABLE  (CPT=71045)    Result Date: 1/29/2025  CONCLUSION:  There is dense consolidation and effusion in the right midlung and lung base.  This is concerning for pneumonia.  Prominent interstitial markings in the right upper lobe and left midlung and lung base.  Stable appearing heart size.   LOCATION:  [id number]      Dictated by (CST): Sandra Howard MD on 1/29/2025 at 12:35 PM     Finalized by (CST): Sandra Howard MD on 1/29/2025 at 12:36 PM        Vital Signs:  Blood pressure 153/65, pulse 82, temperature 98 °F (36.7 °C), temperature source Oral, resp. rate 20, height 5' 4.5\" (1.638 m), weight 194 lb 1.6 oz (88 kg), SpO2 100%.        Physical Exam:     GENERAL: Alert. NAD.  BODY HABITUS:  Body mass index is 32.8 kg/m².  HEENT: No focal deficits.   RESPIRATORY: mild increase effort at rest on NC.  NEUROLOGIC: A&Ox4. BECKMAN.  PSYCHIATRIC:  appropriate, pleasant.    Pre-hospital Palliative Performance Scale: (pt/family reported/per chart review): 60 %  Current Palliative Performance Scale:  50%    Palliative Performance Scale   % Ambulation Activity Level Self-Care Intake Consciousness   100 Full Normal Full   Normal Full   90 Full No disease  Normal Full Normal Full   80 Full Some disease  Normal w/effort Full Normal or  Reduced Full   70 Reduced Some disease  Can't perform job Full Normal or   Reduced Full   60 Reduced Significant disease  Can't perform hobby Occasional  Assist Normal or   Reduced Full or  confused   50 Mainly sit/lie Extensive Disease  Can't do any work Partial Assist Normal or Reduced Full or confused   40 Mainly in bed Extensive Disease  Can't do any work Mainly Assist Normal or Reduced Full or confused   30 Bedbound Extensive Disease  Can't do any work Max Assist  Total Care Reduced Drowsy/confused   20 Bedbound Extensive Disease  Can't do any work Max Assist  Total Care Minimal Drowsy/confused   10 Bedbound/coma Extensive Disease  Can't do any work  coma  Max Assist  Total Care Mouth care Drowsy or coma   0 Death     Palliative Care Assessment       Goals of Care:      Provided introduction to Palliative Care and reason for consultation to Lisbet. Discussion included the benefits of palliative care to provide extra layer of support to patient/family who wish to continue curative or restorative medical therapies. Palliative care was differentiated from hospice philosophy and model of care by reviewing the treatment-focus with palliative care, versus comfort-focused care with hospice.   We also discussed that having palliative care support does not limit medical treatment options or decisions.       Palliative Care Services:  1) Usually visit once per 2-3 weeks  2) Perform GOC discussions  3) Follow up on symptom management    Palliative Care criteria:  1) Is not effected by prognostication (can receive palliative care services if prognosis is in hours, days, months, years, decades)  2) May continue life-prolonging measures and treatments  3) Includes treatment of symptoms to improve quality of life while receiving above measures and treatments  4) Consultation services Hospice services:  1) Phone services 24/7 (they will be your 911 at all hours when symptoms flare)  2) Increase visits according to symptoms (more robust than palliative care)    Hospice criteria:  1) Requires less than 6 months prognosis of life by two physicians  2) Must forego life-prolonging measures and treatments  3) Focus on  complete and total comfort care  4) Must agree to sign on hospice benefit to receive services     Discussion was limited due to leaving unit for CT chest, but Lisbet welcomed cursory discussion as well as follow up tomorrow for more comprehensive review of her care.    Diagnostic understanding and Prognostic Awareness:  Lisbet has excellent understanding of her condition and treatment options.  She shared CT chest is planned to assess lower R lobe. She understands pulm stenosis and the pulmonologist's suspicion that the stent is compromised and could become occluded but does not want to undergo scopes or procedures to address this at this point.     She shared she has declined invasive testing and procedures at this point in her life for fear of risk > benefit, and perhaps any further tests/procedures not changing her outcome for the better. At this point her hopes are to have symptom control and to pass naturally as her  did.    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 She has been dealing with pulm issues since 1998 and has developed an understanding of her body's response to tx and what it can/cannot tolerate.     She hopes to understand what the CT chest shows, and have another conversation w her pulmonologist tomorrow to review further treatment options and make further decisions if needed.     Emotional support provided to Lisbet.     Advance Care Planning:    Code Status:  Full Code.  A voluntary discussion surrounding resuscitation and LST took place with Lisbet which evolved out of her sharing her preferences against invasive procedures, testing, and having a peaceful EOL. I shared w her that she is listed as a \"Full code\" here and what this means. She was surprised to hear that, thinking she had documented limits to this with an . She wants to review document and d/w her family before further discussion  and decisions take place. She is open to follow up for support with this.    HCPOA:  Document on file.  Healthcare Agent Appointed: Yes  Healthcare Agent's Name: Marlin Rand  Healthcare Agent's Phone Number: [phone]        Problem List:       Principal Problem:    Community acquired pneumonia of right lung, unspecified part of lung  Active Problems:    Acute on chronic respiratory failure with hypoxia (HCC)    Bronchial stenosis    Chronic hypoxic respiratory failure (HCC)    Type 2 diabetes mellitus without complication, without long-term current use of insulin (HCC)    Pulmonary sarcoidosis (HCC)    COPD exacerbation (HCC)    Palliative care encounter    Counseling regarding advance care planning and goals of care      Palliative Care Recommendations / Plan:       Symptoms:   Dyspnea, present - :  will explore at follow up  Cough, present - :  will explore further at f/u  Pain - R-sided pleuritic pain:  Discussed possibility of making Norco available q4hr vs q6hr PRN if she feels doses are required sooner. She will consider.  OIC, prevention:  Last BM 1/30. Bowel regimen available PRN.     Goals of Care:  Are being considered.  For now, treatment focused w limited testing and procedures (no bronch, no surgery),  CT ok.  Further decisions or limits will be pending course.   Off unit for CT chest, awaits further d/w pulm tomorrow, and open to palliative f/u to discuss broader GOC.    Code Status: Full Code.  Lisbet thought she had DNAR w Advance Directives completed w . She is informed she is listed as full code here, and what this means. She requests more time to consider and d/w her family, and is open to ongoing support.    HCPOA: Mile Rand     Psychosocial:  Emotional support provided.    Disposition:  pending clinical course.      A total of 50 minutes were spent on this consult, which included all of the following:  Chart review, direct face to face contact, history taking,  physical examination, counseling and coordinating care, and documentation.     I reviewed the above with pulm.    Thank you for inviting Palliative Care Service to participate in the care of Lisbet Velarde and family.       Will follow.      MICKY Arroyo  Palliative Care    1/30/2025  2:30 PM      The 21st Century Cures Act makes medical notes like these available to patients in the interest of transparency. Please be advised this is a medical document. Medical documents are intended to carry relevant information, facts as evident, and the clinical opinion of the practitioner. The medical note is intended as a peer to peer communication, and may appear blunt or direct. It is written in medical language and may contain abbreviations or verbiage that are unfamiliar.         [1]   Allergies  Allergen Reactions    Augmentin [Amoxicillin-Pot Clavulanate] ANGIOEDEMA     Per patient, tolerates cefdinir    Vancomycin TONGUE SWELLING    Doxycycline NAUSEA AND VOMITING    Levaquin [Levofloxacin] DIZZINESS and OTHER (SEE COMMENTS)     Hot, flushed.

## 2025-01-30 NOTE — PROGRESS NOTES
NURSING ADMISSION NOTE      Patient admitted via Cart  Oriented to room.  Safety precautions initiated.  Bed in low position.  Call light in reach.    Received patient A&Ox4. Upper dentures. 2L NC, at baseline. Carb control diet. QID accucheck. Continent x2. Up SB/ walker. C/o back pain, PRN pain given per MAR. IVF, Nebs, IV abx. Plan of care continues.

## 2025-01-30 NOTE — PROGRESS NOTES
[facility]   part of [facility]     Hospitalist Progress Note     Lisbet Velarde Patient Status:  Inpatient    1945 MRN [id number]   Location [facility] 5NW-A Attending Marylin Mcginnis MD   Hosp Day # 1 PCP Mumtaz Malone MD     Chief Complaint: Cough    Subjective:     Patient complains of intractable cough.  Denies any chest pain.  Bringing up some phlegm.  On 2 L of oxygen by nasal cannula.  Afebrile.    Objective:    Review of Systems:   A comprehensive review of systems was completed; pertinent positive and negatives stated in subjective.    Vital signs:  Temp:  [97.6 °F (36.4 °C)-98.7 °F (37.1 °C)] 97.6 °F (36.4 °C)  Pulse:  [] 97  Resp:  [15-24] 18  BP: (110-172)/(65-97) 145/66  SpO2:  [99 %-100 %] 100 %    Physical Exam:    /66 (BP Location: Left arm)   Pulse 97   Temp 97.6 °F (36.4 °C) (Oral)   Resp 18   Ht 5' 4.5\" (1.638 m)   Wt 194 lb 1.6 oz (88 kg)   SpO2 100%   BMI 32.80 kg/m²     General: No acute distress  Respiratory: No wheezes, no rhonchi  Cardiovascular: S1, S2, regular rate and rhythm  Abdomen: Soft, Non-tender, non-distended, positive bowel sounds  Neuro: No new focal deficits.   Extremities: No edema      Diagnostic Data:    Labs:  Recent Labs   Lab 25  1458 25  0842   WBC 15.2* 14.0*   HGB 9.2* 10.5*   MCV 85.7 87.8   .0* 474.0*       Recent Labs   Lab 25  1458 25  0842   GLU 93 102*   BUN 15 10   CREATSERUM 0.93 0.84   CA 9.5 10.0   ALB 4.2  --     137   K 4.2 4.1    105   CO2 29.0 28.0   ALKPHO 132  --    AST 17  --    ALT 8*  --    BILT 0.3  --    TP 8.0  --        Estimated Glomerular Filtration Rate: 70.8 mL/min/1.73m2 (by CKD-EPI based on SCr of 0.84 mg/dL).    Medications:    amLODIPine  2.5 mg Oral Daily    atorvastatin  10 mg Oral Nightly    fluticasone-salmeterol  1 puff Inhalation BID    losartan  100 mg Oral Daily    metoprolol tartrate  25 mg Oral 2x Daily(Beta Blocker)    pantoprazole  40 mg  Oral QAM AC    enoxaparin  40 mg Subcutaneous Daily    insulin aspart  1-10 Units Subcutaneous TID AC and HS    azithromycin  500 mg Oral Daily    cefTRIAXone  2 g Intravenous Q24H       Assessment & Plan:      #Acute COPD exacerbation   #Chronic hypoxic respiratory failure- on 2 L  #Pulmonary sarcoidosis  #endobronchial stent  -CXR with dense consolidation and effusion in the right midlung and lung base with prominent markings in the right upper lobe and left midlung and lung base  -Continue IV Abx  -follow Cx  -nebs  -follows with Dr. Urrutia, seen by pulmonary-CT chest and palliative care consult planned by pulmonary.  -negative for flu/COVID, RSV     #DM type II  -hold oral medications  -SSI     #Hypertension  -Continue home losartan and metoprolol  -Follow blood pressure-blood pressure improved today      #Normocytic anemia  -check iron studies  -Will check CBC in a.m.    Discussed with patient, RN    Marylin Mcginnis MD    Supplementary Documentation:     Quality:  DVT Mechanical Prophylaxis:   SCDs,    DVT Pharmacologic Prophylaxis   Medication    enoxaparin (Lovenox) 40 MG/0.4ML SUBQ injection 40 mg                Code Status: Full Code  Cummings: No urinary catheter in place  Cummings Duration (in days):   Central line:    RADHA:     Discharge is dependent on: Clinical progress  At this point Ms. Cristiano Velarde is expected to be discharge to: Home    The 21st Century Cures Act makes medical notes like these available to patients in the interest of transparency. Please be advised this is a medical document. Medical documents are intended to carry relevant information, facts as evident, and the clinical opinion of the practitioner. The medical note is intended as peer to peer communication and may appear blunt or direct. It is written in medical language and may contain abbreviations or verbiage that are unfamiliar.

## 2025-01-30 NOTE — CONSULTS
[facility]  Report of Consultation    Lisbet Velarde Patient Status:  Inpatient    1945 MRN [id number]   [facility] 5NW-A Attending Marylin Mcginnis MD   Hosp Day # 1 PCP Mumtaz Malone MD     Reason for Consultation:  Dyspnea, pneumonia    History of Present Illness:  Lisbet Velarde is a a(n) 80 year old female never smoker with PMH sarcoidosis complicated by airway involvement s/p ALEXI/BI metallic stent placed at [facility]  further complicated by bronchial stenosis, CHF, HTN who is admitted with pneumonia. She reports having worsening cough and right back/flank pain over the past few weeks.  She is using home o2 and an oxygen cylinder fell onto her left foot leading to her ER evaluation. While there she underwent workup revealing new infiltrates and possible effusion on the right and now admitted for pneumonia. She feels somewhat better, still with cough dyspnea and pain      2023 previously  Lisbet Quinonez is a 78 year old female never smoker with PMH sarcoidosis complicated by airway involvement s/p ALEXI/BI metallic stent placed at [facility]  further complicated by bronchial stenosis, CHF, HTN who presents today for follow up. She denies acute concerns today. Breathing is stable. no cough at present, has been better controlled recently. No fevers. No pain    2023 Previously  She denies new concerns today, but does admit to having recent worsening cough with green phlegm a couple weeks ago, called her PCP and managed on steroids and abx, now feels better. Dyspnea overall is worse over the last few years, today is stable and better than when hospitalized. No fevers. No chest pain/pressure.     Dec 2023 Previously  She has followed with me the last few years for sarcoidosis and asthma/COPD, but was last seen in .  She was just hospitalized in mid 2022 with pneumonia and feels better since discharge, but not baseline. Still fatigued and having back pain. did have  scant hemoptysis leading to her hospitalization, now resolved. Still with white/clear phlegm. Her breathing has improved, using brezri twice a day. Has albuterol mdi and nebulizers but rarely uses. We had previously given her VEST and saline nebs but she did like either therapy and discontinued them. She denies chest pain/pressure, pleurisy, f/c/s.   History:  Past Medical History:    Arthritis    Bronchial obstruction s/p right mainstem stent    Congestive heart disease (HCC)    COPD (chronic obstructive pulmonary disease) (HCC)    Diabetes mellitus (HCC)    Esophageal reflux    Hearing loss    HTN (hypertension)    Hyperlipidemia    Osteoarthritis    Sarcoidosis     Past Surgical History:   Procedure Laterality Date    Appendectomy      Hysterectomy      Joaquin biopsy stereo nodule 2 site bilat (cpt=19081/24627)      2015    Removal of lung,lobectomy      Tonsillectomy      Total abdom hysterectomy       Family History   Problem Relation Age of Onset    Ovarian Cancer Mother 76    Uterine Cancer Mother     Stroke Mother     Breast Cancer Sister 60    Breast Cancer Sister 40    Stroke Father     Mental Disorder Father     Prostate Cancer Brother       reports that she has never smoked. She has never used smokeless tobacco. She reports that she does not drink alcohol and does not use drugs.    Allergies:  Allergies[1]    Medications:  reviewed   amLODIPine  2.5 mg Oral Daily    atorvastatin  10 mg Oral Nightly    fluticasone-salmeterol  1 puff Inhalation BID    losartan  100 mg Oral Daily    metoprolol tartrate  25 mg Oral 2x Daily(Beta Blocker)    pantoprazole  40 mg Oral QAM AC    enoxaparin  40 mg Subcutaneous Daily    insulin aspart  1-10 Units Subcutaneous TID AC and HS    azithromycin  500 mg Oral Daily    cefTRIAXone  2 g Intravenous Q24H       albuterol    benzonatate    HYDROcodone-acetaminophen    ipratropium-albuterol    ondansetron    tiZANidine    glucose **OR** glucose **OR** glucose-vitamin C **OR**  dextrose **OR** glucose **OR** glucose **OR** glucose-vitamin C    acetaminophen    ondansetron    metoclopramide    polyethylene glycol (PEG 3350)    sennosides    bisacodyl    fleet enema    ipratropium-albuterol    Review of Systems:   Constitutional: Negative for anorexia, chills, fatigue, fevers, malaise, night sweats and weight loss.  Eyes: Negative for visual disturbance, irritation and redness.  Ears, nose, mouth, throat, and face: Negative for hearing loss, tinnitus, nasal congestion, snoring, sore throat, hoarseness and voice change.  Respiratory: see HPI  Cardiovascular: Negative for chest pain, palpitations, irregular heart beats, syncope, fatigue, orthopnea, paroxysmal nocturnal dyspnea, lower extremity edema.  Gastrointestinal: Negative for dysphagia, odynophagia, reflux symptoms, nausea, vomiting, change in bowel habits, diarrhea, constipation and abdominal pain.  Integument/breast: Negative for rash, skin lesions, and pruritus.  Hematologic/lymphatic: Negative for easy bruising, bleeding, and lymphadenopathy.  Musculoskeletal: Negative for myalgias, arthralgias, muscle weakness.  Neurological: Negative for headaches, dizziness, seizures, memory problems, trouble swallowing, speech problems, gait problems and weakness.  : Denies dysuria, hematuria, urinary retention.  Behavioral/Psych: Normal affect, mood, speech. No AVH, No SI/HI    All other review of systems are negative.    Vital signs in last 24 hours:  Patient Vitals for the past 24 hrs:   BP Temp Temp src Pulse Resp SpO2 Height Weight   01/30/25 0840 153/65 98 °F (36.7 °C) Oral 82 20 100 % -- --   01/30/25 0617 155/75 98.7 °F (37.1 °C) Oral 91 18 99 % -- --   01/29/25 1930 156/72 98.5 °F (36.9 °C) Oral 96 18 100 % -- 194 lb 1.6 oz (88 kg)   01/29/25 1827 (!) 170/79 -- -- 102 24 100 % -- --   01/29/25 1715 (!) 172/80 -- -- 96 21 100 % -- --   01/29/25 1515 (!) 110/97 -- -- 98 24 100 % -- --   01/29/25 1500 (!) 170/74 -- -- 92 15 100 % -- --    01/29/25 1138 139/73 98.2 °F (36.8 °C) Oral 71 22 95 % 5' 4.5\" (1.638 m) 189 lb (85.7 kg)       Intake/Output:    Intake/Output Summary (Last 24 hours) at 1/30/2025 1010  Last data filed at 1/30/2025 0617  Gross per 24 hour   Intake 100 ml   Output 100 ml   Net 0 ml          PHYSICAL EXAM  GEN: Appears alert, comfortable. No acute distress  PSYCH: Normal mood and affect, AAOX3  NEURO: CN 2-12 grossly intact, no focal deficits  HEENT: Atraumatic, normocephalic, EOMI, no icterus/hemorrhage, no conjunctival injection/discharge, nares normal  MOUTH: MMM, good dentition  NECK: Trachea midline, symmetric, no visible masses or scars, no crepitus, normal flexion/extension  CHEST: Normal chest excursion, no visible deformity or scars, no tenderness to palpation  PULMONARY:CTAB anteriorly but diminished bs on right mid and lower field. No distress  COR: RRR no m  GI: soft, nondistended, no rigidity, no reaction with palpation. No hernias noted  LYMPHATIC: No palpable or visible lymphadenopathy in neck, axillae, groin  MSK: normal strength and sensation in all extremities  EXT: No overt deformities, trace edema  Lab Data Review:  Recent Labs   Lab 01/29/25  1458 01/30/25  0842   GLU 93 102*   BUN 15 10   CREATSERUM 0.93 0.84   CA 9.5 10.0    137   K 4.2 4.1    105   CO2 29.0 28.0     Recent Labs   Lab 01/29/25  1458 01/30/25  0842   RBC 3.35* 3.86   HGB 9.2* 10.5*   HCT 28.7* 33.9*   MCV 85.7 87.8   MCH 27.5 27.2   MCHC 32.1 31.0   RDW 13.3 13.4   NEPRELIM 7.33 9.06*   WBC 15.2* 14.0*   .0* 474.0*     No results for input(s): \"BNP\" in the last 168 hours.  No results for input(s): \"TROP\", \"CK\" in the last 168 hours.  No results for input(s): \"PT\", \"INR\", \"PTT\" in the last 168 hours.    Other Labs:     ABG:  No results for input(s): \"ABGPHT\", \"AIOREA1X\", \"LWQRT0T\", \"ABGHCO3\", \"ABGBE\", \"TEMP\", \"IMANI\", \"SITE\", \"DEV\", \"THGB\" in the last 168 hours.    Invalid input(s): \"KOB86AXG\", \"CHOB\"    Cultures:   No  results found for this visit on 01/29/25.  No results for input(s): \"COLORUR\", \"CLARITY\", \"SPECGRAVITY\", \"GLUUR\", \"BILUR\", \"KETUR\", \"BLOODURINE\", \"PHURINE\", \"PROUR\", \"UROBILINOGEN\", \"NITRITE\", \"LEUUR\", \"WBCUR\", \"RBCUR\", \"BACUR\", \"EPIUR\" in the last 168 hours.    Radiology:   Reviewed personally  XR FOOT, COMPLETE (MIN 3 VIEWS), LEFT (CPT=73630)    Result Date: 1/29/2025  CONCLUSION:  No acute fracture or dislocation left foot.  There is joint space narrowing 1st through 5th IP joints and 1st MTP joint.   LOCATION:  [id number]   Dictated by ([facility]): Sandra Howard MD on 1/29/2025 at 12:36 PM     Finalized by (CST): Sandra Howard MD on 1/29/2025 at 12:37 PM       XR CHEST AP PORTABLE  (CPT=71045)    Result Date: 1/29/2025  CONCLUSION:  There is dense consolidation and effusion in the right midlung and lung base.  This is concerning for pneumonia.  Prominent interstitial markings in the right upper lobe and left midlung and lung base.  Stable appearing heart size.   LOCATION:  [id number]      Dictated by (CST): Sandra Howard MD on 1/29/2025 at 12:35 PM     Finalized by (CST): Sandra Howard MD on 1/29/2025 at 12:36 PM        Assessment/Plan:  Acute on chronic hypoxic respiratory failure  Reports having oxygen provided by her PCP. Reported RA at rest, 2L on exertion  Now with worsening hypoxia related to pneumonia, possible effusion  Treat as below  Obtain CT chest  She is hesitant to consider any procedures such as thoracentesis, chest tube or bronchoscopy    pneumonia  R sided pneumonia, may have been viral related (reports her granddaughter was sick first)  s/p abx and steroids while hospitalized 11/2022 at [facility]  2/2023 CT chest with improvement in RUL but continued consolidation in RML an RLL. Right sided airways remain occluded and stent appears in place.   1/2025 CXR with new infiltrates and possible effusion  Continue treatment for pneumonia and await cultures  Obtain CT chest  Start nebs and airway clearance  We have  discussed previously about performing bronchoscopy and she has remained hesitant to consider any procedure due to concern for risk of complications and reiterates that today    bronchiectasis  Noted on imaging with right sided bronchiectasis related to chronic right main stem/ bronchus intermedius bronchial stenosis  We have attempted several therapies in the past including VEST and saline nebulizers but she did not like either so discontinued them  Start nebs while here and flutter valve  On breztri at home. Previously advised she reconsider saline nebs should she develop phlegm. Does not want VEST    COPD/asthma  Never smoker but history of sarcoidosis s/p ALEXI/BI stenting  Continue inhalers - breztri BID and albuterol mdi/neb prn  Completed pulm rehab at [facility] in 2019; advised to consider reenrolling   Treat pneumonia as above  Start nebs    Bronchial stenosis  Complication from sarcoidosis with placement of ALEXI/BI metallic stent at [facility] in 1996  I have had multiple conversations with the patient over the years regarding the chronic stent and the stenosis. I suspect the stent is endothelialized and would be quite difficult to consider removal and because of this, the stenosis will likely be irreversible. She is aware the stenosis could be easily occluded and again advise she use her inhalers, nebs and airway clearance therapies to maintain patency. We have also reviewed bronchoscopy to evaluate the airway but is clear she does NOT want to consider any invasive procedures and is aware the stenosis could worsen. She prefers to approach her therapy with goal to maintain her breathing as comfortable with no procedures    Will ask palliative care to aid in determining GOC given she is clear she does not want aggressive care and aid in symptom control    Thank you for the consultation.  Will follow with you.    Ramos Urrutia MD        [1]   Allergies  Allergen Reactions    Augmentin [Amoxicillin-Pot Clavulanate]  ANGIOEDEMA     Per patient, tolerates cefdinir    Vancomycin TONGUE SWELLING    Doxycycline NAUSEA AND VOMITING    Levaquin [Levofloxacin] DIZZINESS and OTHER (SEE COMMENTS)     Hot, flushed.

## 2025-01-30 NOTE — H&P
[facility]     Lisbet Velarde Patient Status:  Inpatient    1945 MRN [id number]   Location [facility] 5NW-A Attending Una Case MD   Hosp Day # 0 PCP Mumtaz Malone MD     Chief Complaint: cough    Subjective:    History of Present Illness:     Lisbet Velarde is a 80 year old female with medical history of DM type II, COPD, CHF, GERD, hyperlipidemia and sarcoidosis who presents tot he ER with complaints of a cough productive of greenish phlegm for the past 3 days.  She initially come in because she dropped her o2 cylinder on her foot which then became swollen.  She reports that swelling has now resolved.  She denies a fever, chills, nausea or abdominal pain.  She wears o2 at 2 L at baseline for pulmonary sarcoidosis    History/Other:    Past Medical History:  Past Medical History:    Arthritis    Bronchial obstruction s/p right mainstem stent    Congestive heart disease (HCC)    COPD (chronic obstructive pulmonary disease) (HCC)    Diabetes mellitus (HCC)    Esophageal reflux    Hearing loss    HTN (hypertension)    Hyperlipidemia    Osteoarthritis    Sarcoidosis     Past Surgical History:   Past Surgical History:   Procedure Laterality Date    Appendectomy      Hysterectomy      Joaquin biopsy stereo nodule 2 site bilat (cpt=19081/99940)          Removal of lung,lobectomy      Tonsillectomy      Total abdom hysterectomy        Family History:   Family History   Problem Relation Age of Onset    Ovarian Cancer Mother 76    Uterine Cancer Mother     Stroke Mother     Breast Cancer Sister 60    Breast Cancer Sister 40    Stroke Father     Mental Disorder Father     Prostate Cancer Brother      Social History:    reports that she has never smoked. She has never used smokeless tobacco. She reports that she does not drink alcohol and does not use drugs.     Allergies: Allergies[1]    Medications:  Medications Ordered Prior to Encounter[2]    Review of Systems:   A  comprehensive review of systems was completed.    Pertinent positives and negatives noted in the HPI.    Objective:   Physical Exam:    /72 (BP Location: Left arm)   Pulse 96   Temp 98.5 °F (36.9 °C) (Oral)   Resp 18   Ht 5' 4.5\" (1.638 m)   Wt 194 lb 1.6 oz (88 kg)   SpO2 100%   BMI 32.80 kg/m²   General: No acute distress, Alert  Respiratory: No rhonchi, no wheezes  Cardiovascular: S1, S2. Regular rate and rhythm  Abdomen: Soft, Non-tender, non-distended, positive bowel sounds  Neuro: No new focal deficits  Extremities: No edema      Results:    Labs:      Labs Last 24 Hours:    Recent Labs   Lab 01/29/25  1458   RBC 3.35*   HGB 9.2*   HCT 28.7*   MCV 85.7   MCH 27.5   MCHC 32.1   RDW 13.3   NEPRELIM 7.33   WBC 15.2*   .0*       Recent Labs   Lab 01/29/25  1458   GLU 93   BUN 15   CREATSERUM 0.93   EGFRCR 63   CA 9.5   ALB 4.2      K 4.2      CO2 29.0   ALKPHO 132   AST 17   ALT 8*   BILT 0.3   TP 8.0       Estimated Glomerular Filtration Rate: 62.6 mL/min/1.73m2 (by CKD-EPI based on SCr of 0.93 mg/dL).    Lab Results   Component Value Date    INR 0.96 09/26/2020    INR 0.93 05/01/2018       No results for input(s): \"TROP\", \"TROPHS\", \"CK\" in the last 168 hours.    Recent Labs   Lab 01/29/25  1458   PBNP 391       No results for input(s): \"PCT\" in the last 168 hours.    Imaging: Imaging data reviewed in Epic.    Assessment & Plan:      #Acute COPD exacerbation   #Chronic hypoxic respiratory failure- on 2 L  #Pulmonary sarcoidosis  #endobronchial stent  -CXR with dense consolidation and effusion in the right midlung and lung base with prominent markings in the right upper lobe and left midlung and lung base  -Abx  -follow Cx  -nebs  -follows with Dr. Urrutia   -negative for flu/COVID, RSV    #DM type II  -hold oral medications  -SSI    #Hypertension  -resume PTA losartan and metoprolol    #Normocytic anemia  -check iron studies  -monitor      Plan of care discussed with  patient    Nay Monreal MD    Supplementary Documentation:     The 21st Century Cures Act makes medical notes like these available to patients in the interest of transparency. Please be advised this is a medical document. Medical documents are intended to carry relevant information, facts as evident, and the clinical opinion of the practitioner. The medical note is intended as peer to peer communication and may appear blunt or direct. It is written in medical language and may contain abbreviations or verbiage that are unfamiliar.               **Certification      PHYSICIAN Certification of Need for Inpatient Hospitalization - Initial Certification    Patient will require inpatient services that will reasonably be expected to span two midnight's based on the clinical documentation in H+P.   Based on patients current state of illness, I anticipate that, after discharge, patient will require TBD.                        [1]   Allergies  Allergen Reactions    Augmentin [Amoxicillin-Pot Clavulanate] ANGIOEDEMA     Per patient, tolerates cefdinir    Vancomycin TONGUE SWELLING    Doxycycline NAUSEA AND VOMITING    Levaquin [Levofloxacin] DIZZINESS and OTHER (SEE COMMENTS)     Hot, flushed.   [2]   No current facility-administered medications on file prior to encounter.     Current Outpatient Medications on File Prior to Encounter   Medication Sig Dispense Refill    pantoprazole 40 MG Oral Tab EC Take 1 tablet (40 mg total) by mouth every morning before breakfast.      tiZANidine 2 MG Oral Tab Take 1 tablet (2 mg total) by mouth 3 (three) times daily as needed (muscle spasms).      metFORMIN HCl ER, OSM, 500 MG (OSM) Oral Tablet 24 Hr Take 1 tablet (500 mg total) by mouth daily with breakfast.      amLODIPine 2.5 MG Oral Tab Take 1 tablet (2.5 mg total) by mouth daily.      benzonatate 200 MG Oral Cap Take 1 capsule (200 mg total) by mouth 3 (three) times daily as needed for cough.      Budesonide-Formoterol Fumarate  160-4.5 MCG/ACT Inhalation Aerosol Inhale 2 puffs into the lungs 2 (two) times daily as needed (shortness of breath).      ondansetron 4 MG Oral Tablet Dispersible Take 1 tablet (4 mg total) by mouth every 8 (eight) hours as needed for Nausea. 20 tablet 0    losartan 100 MG Oral Tab Take 1 tablet (100 mg total) by mouth daily.      ipratropium-albuterol 0.5-2.5 (3) MG/3ML Inhalation Solution Take 3 mL by nebulization 4 (four) times daily as needed.      HYDROcodone-acetaminophen  MG Oral Tab Take 1 tablet by mouth every 6 (six) hours as needed. 21 tablet 0    metoprolol tartrate 25 MG Oral Tab Take 1 tablet (25 mg total) by mouth 2x Daily(Beta Blocker). 60 tablet 0    atorvastatin 10 MG Oral Tab Take 1 tablet (10 mg total) by mouth nightly.      Albuterol Sulfate  (90 Base) MCG/ACT Inhalation Aero Soln Inhale 2 puffs into the lungs every 4 (four) hours as needed for Wheezing or Shortness of Breath. CARRY YOUR INHALER WITH YOU. 1 Inhaler 0

## 2025-01-31 ENCOUNTER — TELEPHONE (OUTPATIENT)
Facility: CLINIC | Age: 80
End: 2025-01-31

## 2025-01-31 VITALS
BODY MASS INDEX: 32.74 KG/M2 | HEIGHT: 64.5 IN | DIASTOLIC BLOOD PRESSURE: 70 MMHG | RESPIRATION RATE: 18 BRPM | HEART RATE: 97 BPM | SYSTOLIC BLOOD PRESSURE: 159 MMHG | WEIGHT: 194.13 LBS | TEMPERATURE: 99 F | OXYGEN SATURATION: 100 %

## 2025-01-31 LAB
ANION GAP SERPL CALC-SCNC: 11 MMOL/L (ref 0–18)
BASOPHILS # BLD AUTO: 0.07 X10(3) UL (ref 0–0.2)
BASOPHILS NFR BLD AUTO: 0.5 %
BUN BLD-MCNC: 7 MG/DL (ref 9–23)
CALCIUM BLD-MCNC: 9.4 MG/DL (ref 8.7–10.6)
CHLORIDE SERPL-SCNC: 102 MMOL/L (ref 98–112)
CO2 SERPL-SCNC: 28 MMOL/L (ref 21–32)
CREAT BLD-MCNC: 0.66 MG/DL
EGFRCR SERPLBLD CKD-EPI 2021: 89 ML/MIN/1.73M2 (ref 60–?)
EOSINOPHIL # BLD AUTO: 0.14 X10(3) UL (ref 0–0.7)
EOSINOPHIL NFR BLD AUTO: 1 %
ERYTHROCYTE [DISTWIDTH] IN BLOOD BY AUTOMATED COUNT: 13.5 %
GLUCOSE BLD-MCNC: 101 MG/DL (ref 70–99)
GLUCOSE BLD-MCNC: 91 MG/DL (ref 70–99)
GLUCOSE BLD-MCNC: 93 MG/DL (ref 70–99)
HCT VFR BLD AUTO: 29.9 %
HGB BLD-MCNC: 9.4 G/DL
IMM GRANULOCYTES # BLD AUTO: 0.11 X10(3) UL (ref 0–1)
IMM GRANULOCYTES NFR BLD: 0.8 %
IRON SATN MFR SERPL: 7 %
IRON SERPL-MCNC: 19 UG/DL
LYMPHOCYTES # BLD AUTO: 5.65 X10(3) UL (ref 1–4)
LYMPHOCYTES NFR BLD AUTO: 38.8 %
MCH RBC QN AUTO: 27.2 PG (ref 26–34)
MCHC RBC AUTO-ENTMCNC: 31.4 G/DL (ref 31–37)
MCV RBC AUTO: 86.4 FL
MONOCYTES # BLD AUTO: 1.21 X10(3) UL (ref 0.1–1)
MONOCYTES NFR BLD AUTO: 8.3 %
NEUTROPHILS # BLD AUTO: 7.37 X10 (3) UL (ref 1.5–7.7)
NEUTROPHILS # BLD AUTO: 7.37 X10(3) UL (ref 1.5–7.7)
NEUTROPHILS NFR BLD AUTO: 50.6 %
OSMOLALITY SERPL CALC.SUM OF ELEC: 290 MOSM/KG (ref 275–295)
PLATELET # BLD AUTO: 459 10(3)UL (ref 150–450)
PLATELET MORPHOLOGY: NORMAL
POTASSIUM SERPL-SCNC: 4.2 MMOL/L (ref 3.5–5.1)
RBC # BLD AUTO: 3.46 X10(6)UL
SODIUM SERPL-SCNC: 141 MMOL/L (ref 136–145)
TOTAL IRON BINDING CAPACITY: 260 UG/DL (ref 250–425)
TRANSFERRIN SERPL-MCNC: 207 MG/DL (ref 250–380)
WBC # BLD AUTO: 14.6 X10(3) UL (ref 4–11)

## 2025-01-31 PROCEDURE — 99232 SBSQ HOSP IP/OBS MODERATE 35: CPT | Performed by: NURSE PRACTITIONER

## 2025-01-31 PROCEDURE — 99233 SBSQ HOSP IP/OBS HIGH 50: CPT | Performed by: INTERNAL MEDICINE

## 2025-01-31 PROCEDURE — 99239 HOSP IP/OBS DSCHRG MGMT >30: CPT | Performed by: INTERNAL MEDICINE

## 2025-01-31 RX ORDER — CEFPODOXIME PROXETIL 200 MG/1
200 TABLET, FILM COATED ORAL 2 TIMES DAILY
Qty: 10 TABLET | Refills: 0 | Status: SHIPPED | OUTPATIENT
Start: 2025-01-31 | End: 2025-02-05

## 2025-01-31 RX ORDER — IPRATROPIUM BROMIDE AND ALBUTEROL SULFATE 2.5; .5 MG/3ML; MG/3ML
3 SOLUTION RESPIRATORY (INHALATION) 3 TIMES DAILY
Status: DISCONTINUED | OUTPATIENT
Start: 2025-01-31 | End: 2025-01-31

## 2025-01-31 NOTE — PROGRESS NOTES
[facility]   part of [facility]     Hospitalist Progress Note     Lisbet Velarde Patient Status:  Inpatient    1945 MRN [id number]   Location [facility] 5NW-A Attending Marylin Mcginnis MD   Hosp Day # 2 PCP Mumtaz Malone MD     Chief Complaint: Cough    Subjective:     Patient feels better today. Denies any chest pain.  Bringing up some phlegm.  On 2 L of oxygen by nasal cannula.  Afebrile.    Objective:    Review of Systems:   A comprehensive review of systems was completed; pertinent positive and negatives stated in subjective.    Vital signs:  Temp:  [97.4 °F (36.3 °C)-98.7 °F (37.1 °C)] 98.5 °F (36.9 °C)  Pulse:  [] 97  Resp:  [18-20] 18  BP: (145-167)/(66-85) 159/70  SpO2:  [98 %-100 %] 100 %    Physical Exam:    /70 (BP Location: Left arm)   Pulse 97   Temp 98.5 °F (36.9 °C) (Oral)   Resp 18   Ht 5' 4.5\" (1.638 m)   Wt 194 lb 1.6 oz (88 kg)   SpO2 100%   BMI 32.80 kg/m²     General: No acute distress  Respiratory: No wheezes, no rhonchi  Cardiovascular: S1, S2, regular rate and rhythm  Abdomen: Soft, Non-tender, non-distended, positive bowel sounds  Neuro: No new focal deficits.   Extremities: No edema      Diagnostic Data:    Labs:  Recent Labs   Lab 25  1458 25  0842 25  1218   WBC 15.2* 14.0* 14.6*   HGB 9.2* 10.5* 9.4*   MCV 85.7 87.8 86.4   .0* 474.0* 459.0*       Recent Labs   Lab 25  1458 25  0842 25  0659   GLU 93 102* 93   BUN 15 10 7*   CREATSERUM 0.93 0.84 0.66   CA 9.5 10.0 9.4   ALB 4.2  --   --     137 141   K 4.2 4.1 4.2    105 102   CO2 29.0 28.0 28.0   ALKPHO 132  --   --    AST 17  --   --    ALT 8*  --   --    BILT 0.3  --   --    TP 8.0  --   --        Estimated Glomerular Filtration Rate: 89.4 mL/min/1.73m2 (by CKD-EPI based on SCr of 0.66 mg/dL).    Medications:    ipratropium-albuterol  3 mL Nebulization TID    amLODIPine  2.5 mg Oral Daily    atorvastatin  10 mg Oral Nightly     fluticasone-salmeterol  1 puff Inhalation BID    losartan  100 mg Oral Daily    metoprolol tartrate  25 mg Oral 2x Daily(Beta Blocker)    pantoprazole  40 mg Oral QAM AC    enoxaparin  40 mg Subcutaneous Daily    insulin aspart  1-10 Units Subcutaneous TID AC and HS    cefTRIAXone  2 g Intravenous Q24H       Assessment & Plan:      #Acute COPD exacerbation   #Chronic hypoxic respiratory failure- on 2 L  #Pulmonary sarcoidosis  #endobronchial stent  -CXR with dense consolidation and effusion in the right midlung and lung base with prominent markings in the right upper lobe and left midlung and lung base  -Continue IV Abx  -follow Cx  -nebs  -follows with Dr. Urrutia, seen by pulmonary-CT chest and palliative care consult planned by pulmonary.  -negative for flu/COVID, RSV     #DM type II  -hold oral medications  -SSI     #Hypertension  -Continue home losartan and metoprolol  -Follow blood pressure-blood pressure improved today      #Normocytic anemia  -check iron studies  -Will check CBC in a.m.    Discussed with patient, RN    Wants to go home today, cleared for dc by pulm today , f/u with pulm as directed, reg OP PCP Mumtaz Malone MD in 1 week    Marylin Mcginnis MD    Supplementary Documentation:     Quality:  DVT Mechanical Prophylaxis:   SCDs,    DVT Pharmacologic Prophylaxis   Medication    enoxaparin (Lovenox) 40 MG/0.4ML SUBQ injection 40 mg                Code Status: Full Code  Cummings: No urinary catheter in place  Cummings Duration (in days):   Central line:    RADHA: 1/31/2025    Discharge is dependent on: Clinical progress  At this point Ms. Cristiano Velarde is expected to be discharge to: Home    The 21st Century Cures Act makes medical notes like these available to patients in the interest of transparency. Please be advised this is a medical document. Medical documents are intended to carry relevant information, facts as evident, and the clinical opinion of the practitioner. The medical note is intended as peer  to peer communication and may appear blunt or direct. It is written in medical language and may contain abbreviations or verbiage that are unfamiliar.

## 2025-01-31 NOTE — PROGRESS NOTES
NURSING DISCHARGE NOTE    Discharged Home via Wheelchair.  Accompanied by RN  Belongings Taken by patient/family.    Discharge paperwork reviewed with patient. Patient verbalized understanding. IV removed, canula intact.

## 2025-01-31 NOTE — TELEPHONE ENCOUNTER
Received a call from [facility] patient has an allergy to PCN.      She has a prescription that was sent by her primary on the 25 th for Cefdinir 300 mg bid # 20.and they just got a script for Cefpodoxime. Asking which one should she fill.   Per ESTER Adam , aware of allergies  and patient to only take the Cefpodoxime.

## 2025-01-31 NOTE — PLAN OF CARE
Patient is A&O x3-4. SBA w/walker. Carb controlled diet, accuchecks QID. RA without exertion- 1L NC with exertion/ambulation, baseline is 2L.  IV & PO antibiotics. Lovenox subcutaneous. C/o back pain, given Norco Q4h. Scheduled Nebs. Patient updated on POC. No further needs at this time.     Problem: Patient/Family Goals  Goal: Patient/Family Long Term Goal  Description: Patient's Long Term Goal: Discharge with adequate resources     Interventions:  - See additional Care Plan goals for specific interventions  Outcome: Progressing  Goal: Patient/Family Short Term Goal  Description: Patient's Short Term Goal:   1/29 NOC: pain management  1/30 NOC: sleep     Interventions:   -   - See additional Care Plan goals for specific interventions  Outcome: Progressing

## 2025-01-31 NOTE — PAYOR COMM NOTE
--------------  ADMISSION REVIEW     Payor: [employer]  Subscriber #:  [id number]  Authorization Number: [id number]    Admit date: 1/29/25  Admit time:  7:04 PM       REVIEW DOCUMENTATION:     ED Provider Notes        ED Provider Notes signed by Genaro De Jesus MD at 1/29/2025  4:52 PM       Author: Genaro De Jesus MD Service: -- Author Type: Physician    Filed: 1/29/2025  4:52 PM Date of Service: 1/29/2025  1:55 PM Status: Addendum    : Genaro De Jesus MD (Physician)    Related Notes: Original Note by Genaro De Jesus MD (Physician) filed at 1/29/2025  4:33 PM           Patient Seen in: [facility] Emergency Department      History     Chief Complaint   Patient presents with    Cough    Foot Injury     Stated Complaint: cough with green phlegm, on oxygen at all times    Subjective:   HPI      80-year-old female comes to the hospital complaint of having difficulty with a cough over the last 3 days.  She said green phlegm production.  She has been increasing her oxygen which is chronic.  She has a history of having sarcoidosis that an endobronchial stent put in place.  She has had pneumonia in the past.  She patient's had fevers.  She has had some bodyaches and chills.  She denies any headaches or dizziness.  She is no pain in her chest or abdominal pains.'s been no vomiting or diarrhea.  The patient also complains of having some discomfort by the area of her left foot that she dropped an oxygen bottle on last week that is bruised and swollen.  She has no numbness or weakness.  She is able to ambulate she is no other complaints at this time.    Objective:     No pertinent past medical history.            No pertinent past surgical history.              No pertinent social history.                Physical Exam     ED Triage Vitals [01/29/25 1138]   /73   Pulse 71   Resp 22   Temp 98.2 °F (36.8 °C)   Temp src Oral   SpO2 95 %   O2 Device Nasal cannula       Current Vitals:   Vital  Signs  BP: (!) 110/97  Pulse: 98  Resp: 24  Temp: 98.2 °F (36.8 °C)  Temp src: Oral  MAP (mmHg): (!) 102    Oxygen Therapy  SpO2: 100 %  O2 Device: Nasal cannula  O2 Flow Rate (L/min): 2 L/min        Physical Exam  HEENT : NCAT, EOMI, PEERL,  neck supple, no JVD, trachea midline, No LAD  Heart: S1S2 normal. No murmurs, regular rate and rhythm  Lungs:  diminished lung sounds noted to the right lung greatest in the lower region  Abdomen: Soft nontender nondistended normal active bowel sounds without rebound, guarding or masses noted  Back nontender without CVA tenderness  Extremity there is some bruising and ecchymosis present tenderness over the left foot without deformity noted and neurovascularly intact  Neuro: No focal deficits noted    All measures to prevent infection transmission during my interaction with the patient were taken.  The patient was already wearing droplet mask on my arrival to the room.  Personal protective equipment including a droplet mask as well as gloves were worn throughout the duration of my exam.  Hand washing was performed prior to and after the exam.  Stethoscope and equipment used during my examination was cleaned with a super Sani cloth germicidal wipe following the exam.    ED Course     Labs Reviewed   COMP METABOLIC PANEL (14) - Abnormal; Notable for the following components:       Result Value    ALT 8 (*)     Globulin  3.8 (*)     All other components within normal limits   CBC WITH DIFFERENTIAL WITH PLATELET - Abnormal; Notable for the following components:    WBC 15.2 (*)     RBC 3.35 (*)     HGB 9.2 (*)     HCT 28.7 (*)     .0 (*)     All other components within normal limits   LACTIC ACID, PLASMA - Normal   PRO BETA NATRIURETIC PEPTIDE - Normal   SARS-COV-2/FLU A AND B/RSV BY PCR (GENEXPERT) - Normal    Narrative:     This test is intended for the qualitative detection and differentiation of SARS-CoV-2, influenza A, influenza B, and respiratory syncytial virus (RSV)  viral RNA in nasopharyngeal or nares swabs from individuals suspected of respiratory viral infection consistent with COVID-19 by their healthcare provider. Signs and symptoms of respiratory viral infection due to SARS-CoV-2, influenza, and RSV can be similar.    Test performed using the Xpert Xpress SARS-CoV-2/FLU/RSV (real time RT-PCR)  assay on the GeneXpert instrument, [employer], [place] [address].   This test is being used under the Food and Drug Administration's Emergency Use Authorization.    The authorized Fact Sheet for Healthcare Providers for this assay is available upon request from the laboratory.   SCAN SLIDE   BLOOD CULTURE   BLOOD CULTURE     EKG    Rate, intervals and axes as noted on EKG Report.  Rate: 88  Rhythm: Sinus Rhythm  Reading: , , patient is a normal sinus rhythm without ischemic change           ED Course as of 01/29/25 1633  ------------------------------------------------------------  Time: 01/29 1632  Comment: While therapy the patient had a chest ray done showing significant pneumonia with effusion on the right by my interpretation.  Read the radiology report as well.  Patient's foot film was negative.  Blood cultures x 2 were taken.  Lactic acid level was negative.  The patient's white count is 15.2 thousand.  Her lites were unremarkable.  Her COVID influenza and RSV were negative.  The patient was given IV Rocephin and azithromycin will be admitted the hospital for further management.       XR FOOT, COMPLETE (MIN 3 VIEWS), LEFT (CPT=73630)    Result Date: 1/29/2025  PROCEDURE:  XR FOOT, COMPLETE (MIN 3 VIEWS), LEFT (CPT=73630)  TECHNIQUE:  AP, oblique, and lateral views were obtained.  COMPARISON:  None.  INDICATIONS:  cough with green phlegm, on oxygen at all times, dropped oxygen canister on foot, sat 100% upon arrival  PATIENT STATED HISTORY: (As transcribed by Technologist)  Patient states cough for one week and is coughing up green mucus. Patient shares her oxygen  tank fell on top of her left foot a couple days ago. Had swelling and bruising to left foot.               CONCLUSION:  No acute fracture or dislocation left foot.  There is joint space narrowing 1st through 5th IP joints and 1st MTP joint.   LOCATION:  [id number]   Dictated by ([facility]): Sandra Howard MD on 1/29/2025 at 12:36 PM     Finalized by (CST): Sandra Howard MD on 1/29/2025 at 12:37 PM       XR CHEST AP PORTABLE  (CPT=71045)    Result Date: 1/29/2025  PROCEDURE:  XR CHEST AP PORTABLE  (CPT=71045)  TECHNIQUE:  AP chest radiograph was obtained.  COMPARISON:  [facility] , XR, XR CHEST PA + LAT CHEST (CPT=71046), 9/09/2024, 11:15 AM.  INDICATIONS:  cough with green phlegm, on oxygen at all times, dropped oxygen canister on foot, sat 100% upon arrival  PATIENT STATED HISTORY: (As transcribed by Technologist)  Patient states cough for one week and is coughing up green mucus. Patient shares her oxygen tank fell on top of her left foot a couple days ago. Had swelling and bruising to left foot.              CONCLUSION:  There is dense consolidation and effusion in the right midlung and lung base.  This is concerning for pneumonia.  Prominent interstitial markings in the right upper lobe and left midlung and lung base.  Stable appearing heart size.   LOCATION:  [id number]      Dictated by ([facility]): Sandra Howard MD on 1/29/2025 at 12:35 PM     Finalized by (CST): Sandra Howard MD on 1/29/2025 at 12:36 PM        Medications   sodium chloride 0.9% infusion (125 mL/hr Intravenous New Bag 1/29/25 1545)   azithromycin (Zithromax) 500 mg in sodium chloride 0.9% 250mL IVPB premix (has no administration in time range)   cefTRIAXone (Rocephin) 2 g in sodium chloride 0.9% 100 mL IVPB-ADDV (2 g Intravenous New Bag 1/29/25 1545)           MDM      Differential diagnosis included pneumonia, COVID, influenza, RSV but limited such.  Patient has pneumonia.  He is oxygen dependent.  She has an endobronchial stent and at this time is felt she will  should be placed in the hospital for IV antibiotics.  The hospitalist been notified.      This note was prepared using Dragon Medical voice recognition dictation software.  As a result errors may occur.  When identified to these areas have been corrected.  While every attempt is made to correct errors during dictation discrepancies may still exist.  Please contact if there are any errors.    Admission disposition: 1/29/2025  4:33 PM           Medical Decision Making      Disposition and Plan     Clinical Impression:  1. Community acquired pneumonia of right lung, unspecified part of lung         Disposition:  Admit  1/29/2025  4:33 pm    Follow-up:  No follow-up provider specified.         Hospital Problems       Present on Admission  Date Reviewed: 1/18/2024            ICD-10-CM Noted POA    * (Principal) Community acquired pneumonia of right lung, unspecified part of lung J18.9 1/29/2025 Unknown                Signed by Genaro De Jesus MD on 1/29/2025  4:52 PM         MEDICATIONS ADMINISTERED IN LAST 1 DAY:  amLODIPine (Norvasc) tab 2.5 mg       Date Action Dose Route User    1/31/2025 0817 Given 2.5 mg Oral Gem Church RN          atorvastatin (Lipitor) tab 10 mg       Date Action Dose Route User    1/30/2025 2126 Given 10 mg Oral Noreen Sims RN          azithromycin (Zithromax) tab 500 mg       Date Action Dose Route User    1/31/2025 0817 Given 500 mg Oral Gem Church RN          cefTRIAXone (Rocephin) 2 g in sodium chloride 0.9% 100 mL IVPB-ADDV       Date Action Dose Route User    1/30/2025 1544 New Bag 2 g Intravenous Gem Church RN          enoxaparin (Lovenox) 40 MG/0.4ML SUBQ injection 40 mg       Date Action Dose Route User    1/31/2025 0817 Given 40 mg Subcutaneous (Left Lower Abdomen) Gem Church RN          fluticasone-salmeterol (Advair Diskus) 100-50 MCG/ACT inhaler 1 puff       Date Action Dose Route User    1/31/2025 0817 Given 1 puff Inhalation Gem Church RN    1/30/2025 2126  Given 1 puff Inhalation Noreen Sims RN          HYDROcodone-acetaminophen (Norco)  MG per tab 1 tablet       Date Action Dose Route User    1/30/2025 1543 Given 1 tablet Oral Gem Church RN          HYDROcodone-acetaminophen (Norco)  MG per tab 1 tablet       Date Action Dose Route User    1/31/2025 0819 Given 1 tablet Oral Gem Church RN    1/31/2025 0413 Given 1 tablet Oral Noreen Sims RN    1/30/2025 2126 Given 1 tablet Oral Noreen Sims RN          iopamidol 76% (ISOVUE-370) injection for power injector       Date Action Dose Route User    1/30/2025 1504 Given 100 mL Intravenous Kranthi Armas          losartan (Cozaar) tab 100 mg       Date Action Dose Route User    1/31/2025 0817 Given 100 mg Oral Gem Church RN          metoprolol tartrate (Lopressor) tab 25 mg       Date Action Dose Route User    1/31/2025 0506 Given 25 mg Oral Noreen Sims RN    1/30/2025 1718 Given 25 mg Oral Gem Church RN          pantoprazole (Protonix) DR tab 40 mg       Date Action Dose Route User    1/31/2025 0506 Given 40 mg Oral Noreen Sims RN          sodium chloride 0.9% infusion       Date Action Dose Route User    1/30/2025 1718 New Bag 125 mL/hr Intravenous Gem Church RN            Vitals (last day)       Date/Time Temp Pulse Resp BP SpO2 Weight O2 Device O2 Flow Rate (L/min) [facility]    01/31/25 0844 98.5 °F (36.9 °C) -- -- 167/74 -- -- Nasal cannula 2 L/min AP    01/31/25 0400 98.4 °F (36.9 °C) 97 18 167/85 100 % -- Nasal cannula 2 L/min DS    01/31/25 0034 98.2 °F (36.8 °C) -- 18 -- -- -- Nasal cannula 2 L/min TM    01/30/25 2106 98.7 °F (37.1 °C) 87 20 159/69 100 % -- Nasal cannula 2 L/min DS    01/30/25 1525 97.4 °F (36.3 °C) 105 20 162/74 100 % -- Nasal cannula 2 L/min TQ    01/30/25 1337 97.6 °F (36.4 °C) 97 18 145/66 100 % -- Nasal cannula 2 L/min TQ    01/30/25 0840 98 °F (36.7 °C) 82 20 153/65 100 % -- Nasal cannula 2 L/min TQ    01/30/25 0617 98.7 °F (37.1 °C) 91 18 155/75  99 % -- Nasal cannula 2 L/min DS     01/29/25 1930 98.5 °F (36.9 °C) 96 18 156/72 100 % 194 lb 1.6 oz (88 kg) Nasal cannula 2 L/min DS   01/29/25 1827 -- 102 24 170/79 Abnormal  100 % -- Nasal cannula 2 L/min AM   01/29/25 1715 -- 96 21 172/80 Abnormal  100 % -- Nasal cannula 2 L/min AM   01/29/25 1612 -- -- -- -- -- -- Nasal cannula 2 L/min AM   01/29/25 1515 -- 98 24 110/97 Abnormal  100 % -- Nasal cannula 2 L/min AM   01/29/25 1500 -- 92 15 170/74 Abnormal  100 % -- Nasal cannula 2 L/min AM   01/29/25 1138 98.2 °F (36.8 °C) 71 22 139/73 95 % 189 lb (85.7 kg) Nasal cannula 2 L/min EM     H&P  hief Complaint: cough        Subjective:  History of Present Illness:      Lisbet Velarde is a 80 year old female with medical history of DM type II, COPD, CHF, GERD, hyperlipidemia and sarcoidosis who presents tot he ER with complaints of a cough productive of greenish phlegm for the past 3 days.  She initially come in because she dropped her o2 cylinder on her foot which then became swollen.  She reports that swelling has now resolved.  She denies a fever, chills, nausea or abdominal pain.  She wears o2 at 2 L at baseline for pulmonary sarcoidosis           Assessment & Plan:  #Acute COPD exacerbation   #Chronic hypoxic respiratory failure- on 2 L  #Pulmonary sarcoidosis  #endobronchial stent  -CXR with dense consolidation and effusion in the right midlung and lung base with prominent markings in the right upper lobe and left midlung and lung base  -Abx  -follow Cx  -nebs  -follows with Dr. Urrutia   -negative for flu/COVID, RSV     #DM type II  -hold oral medications  -SSI     #Hypertension  -resume PTA losartan and metoprolol     #Normocytic anemia  -check iron studies  -monitor        Plan of care discussed with patient     1/30 PULMONARY CONSULT NOTE    Reason for Consultation:  Dyspnea, pneumonia     History of Present Illness:  Lisbet Velarde is a a(n) 80 year old female never smoker with PMH sarcoidosis  complicated by airway involvement s/p ALEXI/BI metallic stent placed at [facility] 1996 further complicated by bronchial stenosis, CHF, HTN who is admitted with pneumonia. She reports having worsening cough and right back/flank pain over the past few weeks.  She is using home o2 and an oxygen cylinder fell onto her left foot leading to her ER evaluation. While there she underwent workup revealing new infiltrates and possible effusion on the right and now admitted for pneumonia. She feels somewhat better, still with cough dyspnea and pain       Assessment/Plan:  Acute on chronic hypoxic respiratory failure  Reports having oxygen provided by her PCP. Reported RA at rest, 2L on exertion  Now with worsening hypoxia related to pneumonia, possible effusion  Treat as below  Obtain CT chest  She is hesitant to consider any procedures such as thoracentesis, chest tube or bronchoscopy     pneumonia  R sided pneumonia, may have been viral related (reports her granddaughter was sick first)  s/p abx and steroids while hospitalized 11/2022 at [facility]  2/2023 CT chest with improvement in RUL but continued consolidation in RML an RLL. Right sided airways remain occluded and stent appears in place.   1/2025 CXR with new infiltrates and possible effusion  Continue treatment for pneumonia and await cultures  Obtain CT chest  Start nebs and airway clearance  We have discussed previously about performing bronchoscopy and she has remained hesitant to consider any procedure due to concern for risk of complications and reiterates that today     bronchiectasis  Noted on imaging with right sided bronchiectasis related to chronic right main stem/ bronchus intermedius bronchial stenosis  We have attempted several therapies in the past including VEST and saline nebulizers but she did not like either so discontinued them  Start nebs while here and flutter valve  On breztri at home. Previously advised she reconsider saline nebs should she develop phlegm.  Does not want VEST     COPD/asthma  Never smoker but history of sarcoidosis s/p ALEXI/BI stenting  Continue inhalers - breztri BID and albuterol mdi/neb prn  Completed pulm rehab at [place] in 2019; advised to consider reenrolling   Treat pneumonia as above  Start nebs     Bronchial stenosis  Complication from sarcoidosis with placement of ALEXI/BI metallic stent at [facility] in 1996  I have had multiple conversations with the patient over the years regarding the chronic stent and the stenosis. I suspect the stent is endothelialized and would be quite difficult to consider removal and because of this, the stenosis will likely be irreversible. She is aware the stenosis could be easily occluded and again advise she use her inhalers, nebs and airway clearance therapies to maintain patency. We have also reviewed bronchoscopy to evaluate the airway but is clear she does NOT want to consider any invasive procedures and is aware the stenosis could worsen. She prefers to approach her therapy with goal to maintain her breathing as comfortable with no procedures     Will ask palliative care to aid in determining GOC given she is clear she does not want aggressive care and aid in symptom control    1/30  HOSPITALIST NOTE       Subjective:  Patient complains of intractable cough.  Denies any chest pain.  Bringing up some phlegm.  On 2 L of oxygen by nasal cannula.  Afebrile.     Vital signs:  Temp:  [97.6 °F (36.4 °C)-98.7 °F (37.1 °C)] 97.6 °F (36.4 °C)  Pulse:  [] 97  Resp:  [15-24] 18  BP: (110-172)/(65-97) 145/66  SpO2:  [99 %-100 %] 100 %     Physical Exam:    /66 (BP Location: Left arm)   Pulse 97   Temp 97.6 °F (36.4 °C) (Oral)   Resp 18   Ht 5' 4.5\" (1.638 m)   Wt 194 lb 1.6 oz (88 kg)   SpO2 100%   BMI 32.80 kg/m²      General: No acute distress  Respiratory: No wheezes, no rhonchi  Cardiovascular: S1, S2, regular rate and rhythm  Abdomen: Soft, Non-tender, non-distended, positive bowel sounds  Neuro: No new  focal deficits.   Extremities: No edema        Diagnostic Data:    Labs:       Recent Labs   Lab 01/29/25  1458 01/30/25  0842   WBC 15.2* 14.0*   HGB 9.2* 10.5*   MCV 85.7 87.8   .0* 474.0*              Recent Labs   Lab 01/29/25  1458 01/30/25  0842   GLU 93 102*   BUN 15 10   CREATSERUM 0.93 0.84   CA 9.5 10.0   ALB 4.2  --     137   K 4.2 4.1    105   CO2 29.0 28.0   ALKPHO 132  --    AST 17  --    ALT 8*  --    BILT 0.3  --    TP 8.0  --           Assessment & Plan:  #Acute COPD exacerbation   #Chronic hypoxic respiratory failure- on 2 L  #Pulmonary sarcoidosis  #endobronchial stent  -CXR with dense consolidation and effusion in the right midlung and lung base with prominent markings in the right upper lobe and left midlung and lung base  -Continue IV Abx  -follow Cx  -nebs  -follows with Dr. Urrutia, seen by pulmonary-CT chest and palliative care consult planned by pulmonary.  -negative for flu/COVID, RSV     #DM type II  -hold oral medications  -SSI     #Hypertension  -Continue home losartan and metoprolol  -Follow blood pressure-blood pressure improved today      #Normocytic anemia  -check iron studies  -Will check CBC in a.m.     Discussed with patient, RN     1/31 PULMONARY NOTE      SUBJECTIVE/Interval history:  No acute events overnight, she feels better today, less dyspnea and cough. No pain. No fevers     OBJECTIVE:  Vitals          Vitals:     01/30/25 2106 01/31/25 0034 01/31/25 0400 01/31/25 0844   BP: 159/69   (!) 167/85 (!) 167/74   BP Location: Left arm   Left arm Left arm   Pulse: 87   97     Resp: 20 18 18     Temp: 98.7 °F (37.1 °C) 98.2 °F (36.8 °C) 98.4 °F (36.9 °C) 98.5 °F (36.9 °C)   TempSrc: Oral Oral Oral Oral   SpO2: 100%   100%     Weight:           Height:                    Vital signs in last 24 hours:  Blood pressure (!) 167/74, pulse 97, temperature 98.5 °F (36.9 °C), temperature source Oral, resp. rate 18, height 5' 4.5\" (1.638 m), weight 194 lb 1.6 oz (88 kg),  SpO2 100%.      Intake/Output:  I/O last 3 completed shifts:  In: -   Out: 100 [Urine:100]     Physical Exam:  General: Appears alert, oriented x3. No acute distress  Neurologic:No focal deficits noted.  Alert.  Oriented.  Lungs:improved aeration of right lung, clear now.  Still with diminished bs on right mid/lower field. CTA on left  Heart:RRR no m  Abdomen: soft, nondistended, no rigidity, no reaction with palpation. No hernias noted  Extremities:No overt deformities, no edema     Lab Data Review:         Recent Labs   Lab 01/29/25  1458 01/30/25  0842 01/31/25  0659   GLU 93 102* 93   BUN 15 10 7*   CREATSERUM 0.93 0.84 0.66   CA 9.5 10.0 9.4    137 141   K 4.2 4.1 4.2    105 102   CO2 29.0 28.0 28.0           Recent Labs   Lab 01/29/25  1458 01/30/25  0842   RBC 3.35* 3.86   HGB 9.2* 10.5*   HCT 28.7* 33.9*   MCV 85.7 87.8   MCH 27.5 27.2   MCHC 32.1 31.0   RDW 13.3 13.4   NEPRELIM 7.33 9.06*   WBC 15.2* 14.0*   .0* 474.0*        Result Date: 1/29/2025  CONCLUSION:  There is dense consolidation and effusion in the right midlung and lung base.  This is concerning for pneumonia.  Prominent interstitial markings in the right upper lobe and left midlung and lung base.  Stable appearing heart size.   LOCATION:  [id number]      Dictated by (CST): Sandra Howard MD on 1/29/2025 at 12:35 PM     Finalized by (CST): Sandra Howard MD on 1/29/2025 at 12:36 PM         Assessment/Plan:  Acute on chronic hypoxic respiratory failure  Reports having oxygen provided by her PCP. Reported RA at rest, 2L on exertion  CT chest shows chronic bronchial stenosis of ALEXI/BI due to stent. GGO in RUL. Collapse of RML/RLL  worsening hypoxia related to pneumonia  Treat as below  She remains hesitant to consider any procedures such as thoracentesis, chest tube or bronchoscopy     pneumonia  R sided pneumonia, may have been viral related (reports her granddaughter was sick first)  s/p abx and steroids while hospitalized 11/2022  at [facility]  2/2023 CT chest with improvement in RUL but continued consolidation in RML an RLL. Right sided airways remain occluded and stent appears in place.   1/2025 CXR with new infiltrates and possible effusion  CT chest shows chronic bronchial stenosis of ALEXI/BI due to stent. GGO in RUL. Collapse of RML/RLL  Continue treatment for pneumonia - azith/ceftriaxone  Continue nebs/airway clearance  We have discussed previously about performing bronchoscopy and she has remained hesitant to consider any procedure due to concern for risk of complications and reiterates that today     bronchiectasis  Noted on imaging with right sided bronchiectasis related to chronic right main stem/ bronchus intermedius bronchial stenosis  We have attempted several therapies in the past including VEST and saline nebulizers but she did not like either so discontinued them  continue nebs while here and flutter valve  On breztri at home. Previously advised she reconsider saline nebs should she develop phlegm. Does not want VEST     COPD/asthma  Never smoker but history of sarcoidosis s/p ALEXI/BI stenting  Continue inhalers - breztri BID and albuterol mdi/neb prn  Completed pulm rehab at [facility] in 2019; advised to consider reenrolling   Treat pneumonia as above     Bronchial stenosis  Complication from sarcoidosis with placement of ALEXI/BI metallic stent at [facility] in 1996  I have had multiple conversations with the patient over the years regarding the chronic stent and the stenosis. I suspect the stent is endothelialized and would be quite difficult to consider removal and because of this, the stenosis will likely be irreversible. She is aware the stenosis could be easily occluded and again advise she use her inhalers, nebs and airway clearance therapies to maintain patency. We have also reviewed bronchoscopy to evaluate the airway but is clear she does NOT want to consider any invasive procedures and is aware the stenosis could worsen. She  prefers to approach her therapy with goal to maintain her breathing as comfortable with no procedures  Appreciate palliative care input

## 2025-01-31 NOTE — PROGRESS NOTES
[facility] Progress Note     SUBJECTIVE/Interval history:  No acute events overnight, she feels better today, less dyspnea and cough. No pain. No fevers    Review of Systems:   A comprehensive 14 point review of systems was completed.   Pertinent positives and negatives noted in the HPI.    Medications  Reviewed personally   amLODIPine  2.5 mg Oral Daily    atorvastatin  10 mg Oral Nightly    fluticasone-salmeterol  1 puff Inhalation BID    losartan  100 mg Oral Daily    metoprolol tartrate  25 mg Oral 2x Daily(Beta Blocker)    pantoprazole  40 mg Oral QAM AC    enoxaparin  40 mg Subcutaneous Daily    insulin aspart  1-10 Units Subcutaneous TID AC and HS    cefTRIAXone  2 g Intravenous Q24H       HYDROcodone-acetaminophen    albuterol    benzonatate    ipratropium-albuterol    ondansetron    tiZANidine    glucose **OR** glucose **OR** glucose-vitamin C **OR** dextrose **OR** glucose **OR** glucose **OR** glucose-vitamin C    acetaminophen    ondansetron    metoclopramide    polyethylene glycol (PEG 3350)    sennosides    bisacodyl    fleet enema    ipratropium-albuterol    OBJECTIVE:  Vitals:    01/30/25 2106 01/31/25 0034 01/31/25 0400 01/31/25 0844   BP: 159/69  (!) 167/85 (!) 167/74   BP Location: Left arm  Left arm Left arm   Pulse: 87  97    Resp: 20 18 18    Temp: 98.7 °F (37.1 °C) 98.2 °F (36.8 °C) 98.4 °F (36.9 °C) 98.5 °F (36.9 °C)   TempSrc: Oral Oral Oral Oral   SpO2: 100%  100%    Weight:       Height:           Vital signs in last 24 hours:  Blood pressure (!) 167/74, pulse 97, temperature 98.5 °F (36.9 °C), temperature source Oral, resp. rate 18, height 5' 4.5\" (1.638 m), weight 194 lb 1.6 oz (88 kg), SpO2 100%.     Intake/Output:  I/O last 3 completed shifts:  In: -   Out: 100 [Urine:100]       Physical Exam:  General: Appears alert, oriented x3. No acute distress  Neurologic:No focal deficits noted.  Alert.  Oriented.  Lungs:improved aeration of right lung, clear now.   Still with diminished bs on right mid/lower field. CTA on left  Heart:RRR no m  Abdomen: soft, nondistended, no rigidity, no reaction with palpation. No hernias noted  Extremities:No overt deformities, no edema    Lab Data Review:   Recent Labs   Lab 01/29/25  1458 01/30/25  0842 01/31/25  0659   GLU 93 102* 93   BUN 15 10 7*   CREATSERUM 0.93 0.84 0.66   CA 9.5 10.0 9.4    137 141   K 4.2 4.1 4.2    105 102   CO2 29.0 28.0 28.0     Recent Labs   Lab 01/29/25  1458 01/30/25  0842   RBC 3.35* 3.86   HGB 9.2* 10.5*   HCT 28.7* 33.9*   MCV 85.7 87.8   MCH 27.5 27.2   MCHC 32.1 31.0   RDW 13.3 13.4   NEPRELIM 7.33 9.06*   WBC 15.2* 14.0*   .0* 474.0*     No results for input(s): \"BNP\" in the last 168 hours.  No results for input(s): \"TROP\", \"CK\" in the last 168 hours.  No results for input(s): \"PT\", \"INR\", \"PTT\" in the last 168 hours.  No results for input(s): \"ABGPHT\", \"GQSYUN3T\", \"EOLPO5H\", \"ABGHCO3\", \"ABGBE\", \"TEMP\", \"IMANI\", \"SITE\", \"DEV\", \"THGB\" in the last 168 hours.    Invalid input(s): \"ERV04BLF\", \"CHOB\"    Other Labs:  Interval Culture Data:   Hospital Encounter on 01/29/25   1. Blood Culture     Status: None (Preliminary result)    Collection Time: 01/29/25  3:00 PM    Specimen: Blood,peripheral   Result Value Ref Range    Blood Culture Result No Growth 1 Day N/A     No results for input(s): \"COLORUR\", \"CLARITY\", \"SPECGRAVITY\", \"GLUUR\", \"BILUR\", \"KETUR\", \"BLOODURINE\", \"PHURINE\", \"PROUR\", \"UROBILINOGEN\", \"NITRITE\", \"LEUUR\", \"WBCUR\", \"RBCUR\", \"BACUR\", \"EPIUR\" in the last 168 hours.    Interval Radiology:   Reviewed personally  CTA CHEST (CPT=71275)    Result Date: 1/30/2025  CONCLUSION:  1. Chronic consolidation and volume loss involving the right middle lobe and lower lobe secondary to tracheal stenosis and densely calcified granuloma/lymph nodes along the right hilum.  This appearance is stable from prior imaging. 2. There is however increasing ground-glass opacities within the right upper  lobe concerning for progressive inflammatory change versus progressive pneumonia. 3. The left lung and left pleural space are clear.    LOCATION:  [id number]   Dictated by (CST): Carole Turner DO on 1/30/2025 at 3:11 PM     Finalized by (CST): Carole Turner DO on 1/30/2025 at 3:20 PM       XR FOOT, COMPLETE (MIN 3 VIEWS), LEFT (CPT=73630)    Result Date: 1/29/2025  CONCLUSION:  No acute fracture or dislocation left foot.  There is joint space narrowing 1st through 5th IP joints and 1st MTP joint.   LOCATION:  [id number]   Dictated by (CST): Sandra Howard MD on 1/29/2025 at 12:36 PM     Finalized by (CST): Sandra Howard MD on 1/29/2025 at 12:37 PM       XR CHEST AP PORTABLE  (CPT=71045)    Result Date: 1/29/2025  CONCLUSION:  There is dense consolidation and effusion in the right midlung and lung base.  This is concerning for pneumonia.  Prominent interstitial markings in the right upper lobe and left midlung and lung base.  Stable appearing heart size.   LOCATION:  [id number]      Dictated by (CST): Sandra Howard MD on 1/29/2025 at 12:35 PM     Finalized by (CST): Sandra Howard MD on 1/29/2025 at 12:36 PM        Assessment/Plan:  Acute on chronic hypoxic respiratory failure  Reports having oxygen provided by her PCP. Reported RA at rest, 2L on exertion  CT chest shows chronic bronchial stenosis of ALEXI/BI due to stent. GGO in RUL. Collapse of RML/RLL  worsening hypoxia related to pneumonia  Treat as below  She remains hesitant to consider any procedures such as thoracentesis, chest tube or bronchoscopy     pneumonia  R sided pneumonia, may have been viral related (reports her granddaughter was sick first)  s/p abx and steroids while hospitalized 11/2022 at [facility]  2/2023 CT chest with improvement in RUL but continued consolidation in RML an RLL. Right sided airways remain occluded and stent appears in place.   1/2025 CXR with new infiltrates and possible effusion  CT chest shows chronic bronchial stenosis of ALEXI/BI due to  stent. GGO in RUL. Collapse of RML/RLL  Continue treatment for pneumonia - azith/ceftriaxone  Continue nebs/airway clearance  We have discussed previously about performing bronchoscopy and she has remained hesitant to consider any procedure due to concern for risk of complications and reiterates that today     bronchiectasis  Noted on imaging with right sided bronchiectasis related to chronic right main stem/ bronchus intermedius bronchial stenosis  We have attempted several therapies in the past including VEST and saline nebulizers but she did not like either so discontinued them  continue nebs while here and flutter valve  On breztri at home. Previously advised she reconsider saline nebs should she develop phlegm. Does not want VEST     COPD/asthma  Never smoker but history of sarcoidosis s/p ALEXI/BI stenting  Continue inhalers - breztri BID and albuterol mdi/neb prn  Completed pulm rehab at [place] in 2019; advised to consider reenrolling   Treat pneumonia as above     Bronchial stenosis  Complication from sarcoidosis with placement of ALEXI/BI metallic stent at [facility] in 1996  I have had multiple conversations with the patient over the years regarding the chronic stent and the stenosis. I suspect the stent is endothelialized and would be quite difficult to consider removal and because of this, the stenosis will likely be irreversible. She is aware the stenosis could be easily occluded and again advise she use her inhalers, nebs and airway clearance therapies to maintain patency. We have also reviewed bronchoscopy to evaluate the airway but is clear she does NOT want to consider any invasive procedures and is aware the stenosis could worsen. She prefers to approach her therapy with goal to maintain her breathing as comfortable with no procedures  Appreciate palliative care input    Discharge planning - okay for discharge from pulmonary standpoint with outpatient follow up      Ramos Urrutia MD
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
## 2025-01-31 NOTE — DISCHARGE SUMMARY
[facility]  Discharge Summary    Lisbet Velarde Patient Status:  Inpatient    1945 MRN [id number]   Location [facility] 5NW-A Attending No att. providers found   Hosp Day # 2 PCP Mumtaz Malone MD     Date of Admission: 2025    Date of Discharge: 2025      Disposition: Home or Self Care    Discharge Diagnosis:   #Acute COPD exacerbation   #Chronic hypoxic respiratory failure- on 2 L  #Pulmonary sarcoidosis  # Prior endobronchial stent  #DM type II  #Hypertension  #Normocytic anemia    Chief Complaint:   Chief Complaint   Patient presents with    Cough    Foot Injury       History of Present Illness:   Lisbet Velarde is a 80 year old female with medical history of DM type II, COPD, CHF, GERD, hyperlipidemia and sarcoidosis who presents tot he ER with complaints of a cough productive of greenish phlegm for the past 3 days.  She initially come in because she dropped her o2 cylinder on her foot which then became swollen.  She reports that swelling has now resolved.  She denies a fever, chills, nausea or abdominal pain.  She wears o2 at 2 L at baseline for pulmonary sarcoidosis   Please refer to history and physical done by Dr. Monreal for details on admission    Brief Synopsis:   #Acute COPD exacerbation   #Chronic hypoxic respiratory failure- on 2 L  #Pulmonary sarcoidosis  #endobronchial stent  -CXR with dense consolidation and effusion in the right midlung and lung base with prominent markings in the right upper lobe and left midlung and lung base  -Treated with IV Abx  -Blood culture sent on admission with no growth  -Given nebs  -follows with Dr. Urrutia who was consulted in hospital, seen by pulmonary-CT chest done on 2025 showed chronic consolidation and volume loss involving that right middle lobe and lower lobe secondary to tracheal stenosis and densely calcified granuloma/lymph nodes along the right hilum.  Appearance stable from prior imaging.  Increasing groundglass  opacities within right upper lobe concerning for progressive inflammatory change versus progressive pneumonia.  Left lung and left pleural spaces are clear.  Palliative care consultED  by pulmonary.  -negative for flu/COVID, RSV     #DM type II  -Hyperglycemia protocol  On NovoLog correction factor coverage in hospital     #Hypertension  -ContinueD home losartan and metoprolol  -Follow blood pressure-blood pressure improved today      #Normocytic anemia due to iron deficiency anemia  - iron studies showed iron deficiency, iron supplements as outpatient       Wants to go home today, cleared for dc by pulm today , f/u with pulm as directed, reg OP PCP Mumtaz Malone MD in 1 week      TCM Diagnosis at discharge from Hospital: Other: See above; still recommend for TCM follow-up    Lace+ Score: 75  59-90 High Risk  29-58 Medium Risk  0-28   Low Risk.     TCM Follow-Up Recommendation:Lisbet Velarde   LACE > 58: High Risk of readmission after discharge from the hospital.      PCP: Mumtaz Malone MD    Consultations:   Consultants         Provider   Role Specialty     Ramos Urrutia MD      Consulting Physician PULMONARY DISEASES            Procedures during hospitalization:   None    Incidental or significant findings and recommendations (brief descriptions):  None    Lab/Test results pending at Discharge:   None      Discharge Medications:        Discharge Medications        START taking these medications        Instructions Prescription details   cefpodoxime 200 MG Tabs  Commonly known as: Vantin      Take 1 tablet (200 mg total) by mouth 2 (two) times daily for 5 days.   Stop taking on: February 5, 2025  Quantity: 10 tablet  Refills: 0     ferrous sulfate 325 (65 FE) MG Tbec      Take 1 tablet (325 mg total) by mouth daily with breakfast.   Quantity: 30 tablet  Refills: 0            CONTINUE taking these medications        Instructions Prescription details   albuterol 108 (90 Base) MCG/ACT Aers  Commonly known  as: Ventolin HFA      Inhale 2 puffs into the lungs every 4 (four) hours as needed for Wheezing or Shortness of Breath. CARRY YOUR INHALER WITH YOU.   Quantity: 1 Inhaler  Refills: 0     amLODIPine 2.5 MG Tabs  Commonly known as: Norvasc      Take 1 tablet (2.5 mg total) by mouth daily.   Refills: 0     atorvastatin 10 MG Tabs  Commonly known as: Lipitor      Take 1 tablet (10 mg total) by mouth nightly.   Refills: 0     benzonatate 200 MG Caps  Commonly known as: Tessalon      Take 1 capsule (200 mg total) by mouth 3 (three) times daily as needed for cough.   Refills: 0     Budesonide-Formoterol Fumarate 160-4.5 MCG/ACT Aero  Commonly known as: SYMBICORT      Inhale 2 puffs into the lungs 2 (two) times daily as needed (shortness of breath).   Refills: 0     HYDROcodone-acetaminophen  MG Tabs  Commonly known as: Norco      Take 1 tablet by mouth every 6 (six) hours as needed.   Quantity: 21 tablet  Refills: 0     ipratropium-albuterol 0.5-2.5 (3) MG/3ML Soln  Commonly known as: Duoneb      Take 3 mL by nebulization 4 (four) times daily as needed.   Refills: 0     losartan 100 MG Tabs  Commonly known as: Cozaar      Take 1 tablet (100 mg total) by mouth daily.   Refills: 0     metFORMIN HCl ER (OSM) 500 MG (OSM) Tb24  Commonly known as: FORTAMET      Take 1 tablet (500 mg total) by mouth daily with breakfast.   Refills: 0     metoprolol tartrate 25 MG Tabs  Commonly known as: Lopressor      Take 1 tablet (25 mg total) by mouth 2x Daily(Beta Blocker).   Quantity: 60 tablet  Refills: 0     ondansetron 4 MG Tbdp  Commonly known as: Zofran-ODT      Take 1 tablet (4 mg total) by mouth every 8 (eight) hours as needed for Nausea.   Quantity: 20 tablet  Refills: 0     pantoprazole 40 MG Tbec  Commonly known as: Protonix      Take 1 tablet (40 mg total) by mouth every morning before breakfast.   Refills: 0     tiZANidine 2 MG Tabs  Commonly known as: Zanaflex      Take 1 tablet (2 mg total) by mouth 3 (three) times  daily as needed (muscle spasms).   Refills: 0               Where to Get Your Medications        These medications were sent to [id number] - [place], [place] - [address] [phone], [phone]  [address], [facility] [address]      Phone: [phone]   cefpodoxime 200 MG Tabs  ferrous sulfate 325 (65 FE) MG [facility] prescription monitoring program data reviewed in epic before prescribing narcotics/controlled substances: Yes    Follow up Visits: Follow-up with Mumtaz Malone MD in 1 week    Ramos Urrutia MD  [address], [facility] [address]  [place] [address]  [phone]    Schedule an appointment as soon as possible for a visit in 1 month(s)      Mumtaz Malone MD  [address] [place] [address]  [phone]    Schedule an appointment as soon as possible for a visit in 1 week(s)      Laisha Saravia APRN  [address] [facility] [address]  [facility]  [place] [address]  [phone]    Call  As needed for support with symptoms (difficulty breathing / air hunger) or support with goals of care discussions and advance care planning.    Appointments for Next 30 Days 2/1/2025 - 3/3/2025      None            Physical Exam:  /70 (BP Location: Left arm)   Pulse 97   Temp 98.5 °F (36.9 °C) (Oral)   Resp 18   Ht 5' 4.5\" (1.638 m)   Wt 194 lb 1.6 oz (88 kg)   SpO2 100%   BMI 32.80 kg/m²     General: No acute distress  Respiratory: No wheezes, no rhonchi  Cardiovascular: S1, S2, regular rate and rhythm  Abdomen: Soft, Non-tender, non-distended, positive bowel sounds  Neuro: No new focal deficits.   Extremities: No edema    Discharge Condition: stable    Patient Discharge Instructions: See electronic chart      More than 30 minutes on discharge    Marylin Mcginnis MD

## 2025-01-31 NOTE — PLAN OF CARE
Problem: Patient/Family Goals  Goal: Patient/Family Long Term Goal  Description: Patient's Long Term Goal: Discharge with adequate resources     Interventions:  - See additional Care Plan goals for specific interventions  1/31/2025 1747 by Gem Church RN  Outcome: Adequate for Discharge  1/31/2025 1018 by Gem Church RN  Outcome: Progressing  Goal: Patient/Family Short Term Goal  Description: Patient's Short Term Goal:   1/29 NOC: pain management  1/30 NOC: sleep     Interventions:   -   - See additional Care Plan goals for specific interventions  1/31/2025 1747 by Gem Church RN  Outcome: Adequate for Discharge  1/31/2025 1018 by Gem Church RN  Outcome: Progressing

## 2025-01-31 NOTE — PLAN OF CARE
Patient A&Ox4, confused at times, redirectable. 2L NC. Lovenox. Carb control diet, accucheck QID. Continent x2. Norco for back pain give. Up SBA w/walker. IV and PO abx. Pt updated on POC, all questions and concerns addressed, patient verbalized understanding, safety precautions in place, no further needs at this time.     Problem: Patient/Family Goals  Goal: Patient/Family Long Term Goal  Description: Patient's Long Term Goal: Discharge with adequate resources     Interventions:  - See additional Care Plan goals for specific interventions  Outcome: Progressing  Goal: Patient/Family Short Term Goal  Description: Patient's Short Term Goal:   1/29 NOC: pain management  1/30 NOC: sleep     Interventions:   -   - See additional Care Plan goals for specific interventions  Outcome: Progressing

## 2025-02-01 RX ORDER — FERROUS SULFATE 325(65) MG
325 TABLET, DELAYED RELEASE (ENTERIC COATED) ORAL
Qty: 30 TABLET | Refills: 0 | Status: SHIPPED | OUTPATIENT
Start: 2025-02-01

## 2025-02-03 ENCOUNTER — PATIENT OUTREACH (OUTPATIENT)
Dept: CASE MANAGEMENT | Age: 80
End: 2025-02-03

## 2025-02-03 NOTE — PROGRESS NOTES
Hospital Follow up for PCP/Specialist (Discharge 1/31 edw)       PCP   Mumtaz Malone   [address] [facility] [address] [phone]   Existing appt 2/7@1pm  Pulmonary   Ramos Urrutia : follow up in one month   [address]   [facility] [id number] [phone]   Appt made for 2/25@1:40pm     Confirmed with pt   Closing encounter

## 2025-02-04 NOTE — PAYOR COMM NOTE
--------------  DISCHARGE REVIEW    Payor: [employer]  Subscriber #:  [id number]  Authorization Number: [id number]    Admit date: 25  Admit time:   7:04 PM  Discharge Date: 2025  6:34 PM     Admitting Physician: Una Case MD  Attending Physician:  No att. providers found  Primary Care Physician: Mumtaz Malone MD          Discharge Summary Notes        Discharge Summary signed by Marylin Mcginnis MD at 2025 12:44 AM       Author: Marylin Mcginnis MD Specialty: HOSPITALIST, Internal Medicine Author Type: Physician    Filed: 2025 12:44 AM Date of Service: 2025  1:23 PM Status: Addendum    : Marylin Mcginnis MD (Physician)    Related Notes: Original Note by Marylin Mcginnis MD (Physician) filed at 2025 12:43 AM         [facility]  Discharge Summary    Lisbet Velarde Patient Status:  Inpatient    1945 MRN [id number]   Location [facility] 5NW-A Attending No att. providers found   Hosp Day # 2 PCP Mumtaz Malone MD     Date of Admission: 2025    Date of Discharge: 2025      Disposition: Home or Self Care    Discharge Diagnosis:   #Acute COPD exacerbation   #Chronic hypoxic respiratory failure- on 2 L  #Pulmonary sarcoidosis  # Prior endobronchial stent  #DM type II  #Hypertension  #Normocytic anemia    Chief Complaint:   Chief Complaint   Patient presents with    Cough    Foot Injury       History of Present Illness:   Lisbet Velarde is a 80 year old female with medical history of DM type II, COPD, CHF, GERD, hyperlipidemia and sarcoidosis who presents tot he ER with complaints of a cough productive of greenish phlegm for the past 3 days.  She initially come in because she dropped her o2 cylinder on her foot which then became swollen.  She reports that swelling has now resolved.  She denies a fever, chills, nausea or abdominal pain.  She wears o2 at 2 L at baseline for pulmonary sarcoidosis   Please refer to history and physical done by   Jocelin for details on admission    Brief Synopsis:   #Acute COPD exacerbation   #Chronic hypoxic respiratory failure- on 2 L  #Pulmonary sarcoidosis  #endobronchial stent  -CXR with dense consolidation and effusion in the right midlung and lung base with prominent markings in the right upper lobe and left midlung and lung base  -Treated with IV Abx  -Blood culture sent on admission with no growth  -Given nebs  -follows with Dr. Urrutia who was consulted in hospital, seen by pulmonary-CT chest done on 1/30/2025 showed chronic consolidation and volume loss involving that right middle lobe and lower lobe secondary to tracheal stenosis and densely calcified granuloma/lymph nodes along the right hilum.  Appearance stable from prior imaging.  Increasing groundglass opacities within right upper lobe concerning for progressive inflammatory change versus progressive pneumonia.  Left lung and left pleural spaces are clear.  Palliative care consultED  by pulmonary.  -negative for flu/COVID, RSV     #DM type II  -Hyperglycemia protocol  On NovoLog correction factor coverage in hospital     #Hypertension  -ContinueD home losartan and metoprolol  -Follow blood pressure-blood pressure improved today      #Normocytic anemia due to iron deficiency anemia  - iron studies showed iron deficiency, iron supplements as outpatient       Wants to go home today, cleared for dc by pulm today , f/u with pulm as directed, reg OP PCP Mumtaz Malone MD in 1 week      TCM Diagnosis at discharge from Hospital: Other: See above; still recommend for TCM follow-up    Lace+ Score: 75  59-90 High Risk  29-58 Medium Risk  0-28   Low Risk.     TCM Follow-Up Recommendation:Lisbet Velarde   LACE > 58: High Risk of readmission after discharge from the hospital.      PCP: Mumtaz Malone MD    Consultations:   Consultants         Provider   Role Specialty     Ramos Urrutia MD      Consulting Physician PULMONARY DISEASES            Procedures during  hospitalization:   None    Incidental or significant findings and recommendations (brief descriptions):  None    Lab/Test results pending at Discharge:   None      Discharge Medications:        Discharge Medications        START taking these medications        Instructions Prescription details   cefpodoxime 200 MG Tabs  Commonly known as: Vantin      Take 1 tablet (200 mg total) by mouth 2 (two) times daily for 5 days.   Stop taking on: February 5, 2025  Quantity: 10 tablet  Refills: 0     ferrous sulfate 325 (65 FE) MG Tbec      Take 1 tablet (325 mg total) by mouth daily with breakfast.   Quantity: 30 tablet  Refills: 0            CONTINUE taking these medications        Instructions Prescription details   albuterol 108 (90 Base) MCG/ACT Aers  Commonly known as: Ventolin HFA      Inhale 2 puffs into the lungs every 4 (four) hours as needed for Wheezing or Shortness of Breath. CARRY YOUR INHALER WITH YOU.   Quantity: 1 Inhaler  Refills: 0     amLODIPine 2.5 MG Tabs  Commonly known as: Norvasc      Take 1 tablet (2.5 mg total) by mouth daily.   Refills: 0     atorvastatin 10 MG Tabs  Commonly known as: Lipitor      Take 1 tablet (10 mg total) by mouth nightly.   Refills: 0     benzonatate 200 MG Caps  Commonly known as: Tessalon      Take 1 capsule (200 mg total) by mouth 3 (three) times daily as needed for cough.   Refills: 0     Budesonide-Formoterol Fumarate 160-4.5 MCG/ACT Aero  Commonly known as: SYMBICORT      Inhale 2 puffs into the lungs 2 (two) times daily as needed (shortness of breath).   Refills: 0     HYDROcodone-acetaminophen  MG Tabs  Commonly known as: Norco      Take 1 tablet by mouth every 6 (six) hours as needed.   Quantity: 21 tablet  Refills: 0     ipratropium-albuterol 0.5-2.5 (3) MG/3ML Soln  Commonly known as: Duoneb      Take 3 mL by nebulization 4 (four) times daily as needed.   Refills: 0     losartan 100 MG Tabs  Commonly known as: Cozaar      Take 1 tablet (100 mg total) by mouth  daily.   Refills: 0     metFORMIN HCl ER (OSM) 500 MG (OSM) Tb24  Commonly known as: FORTAMET      Take 1 tablet (500 mg total) by mouth daily with breakfast.   Refills: 0     metoprolol tartrate 25 MG Tabs  Commonly known as: Lopressor      Take 1 tablet (25 mg total) by mouth 2x Daily(Beta Blocker).   Quantity: 60 tablet  Refills: 0     ondansetron 4 MG Tbdp  Commonly known as: Zofran-ODT      Take 1 tablet (4 mg total) by mouth every 8 (eight) hours as needed for Nausea.   Quantity: 20 tablet  Refills: 0     pantoprazole 40 MG Tbec  Commonly known as: Protonix      Take 1 tablet (40 mg total) by mouth every morning before breakfast.   Refills: 0     tiZANidine 2 MG Tabs  Commonly known as: Zanaflex      Take 1 tablet (2 mg total) by mouth 3 (three) times daily as needed (muscle spasms).   Refills: 0               Where to Get Your Medications        These medications were sent to [id number] - [place], [place] - [address] [phone], [phone]  [facility]  [facility] [address]      Phone: [phone]   cefpodoxime 200 MG Tabs  ferrous sulfate 325 (65 FE) MG Tbec          [place] prescription monitoring program data reviewed in epic before prescribing narcotics/controlled substances: Yes    Follow up Visits: Follow-up with Mumtaz Malone MD in 1 week    Ramos Urrutia MD  [address], [facility] [address]  [place] [address]  [phone]    Schedule an appointment as soon as possible for a visit in 1 month(s)      Mumtaz Malone MD  [address] [place] [address]  [phone]    Schedule an appointment as soon as possible for a visit in 1 week(s)      Laisha Saravia APRN  [address] [facility] [address]  [facility]  [place] [address]  [phone]    Call  As needed for support with symptoms (difficulty breathing / air hunger) or support with goals of care discussions and advance care planning.    Appointments for Next 30 Days 2/1/2025 - 3/3/2025      None            Physical  Exam:  /70 (BP Location: Left arm)   Pulse 97   Temp 98.5 °F (36.9 °C) (Oral)   Resp 18   Ht 5' 4.5\" (1.638 m)   Wt 194 lb 1.6 oz (88 kg)   SpO2 100%   BMI 32.80 kg/m²     General: No acute distress  Respiratory: No wheezes, no rhonchi  Cardiovascular: S1, S2, regular rate and rhythm  Abdomen: Soft, Non-tender, non-distended, positive bowel sounds  Neuro: No new focal deficits.   Extremities: No edema    Discharge Condition: stable    Patient Discharge Instructions: See electronic chart      More than 30 minutes on discharge    Marylin Mcginnis MD          Electronically signed by Marylin Mcginnis MD on 2/1/2025 12:44 AM         REVIEWER COMMENTS

## 2025-02-25 ENCOUNTER — OFFICE VISIT (OUTPATIENT)
Facility: CLINIC | Age: 80
End: 2025-02-25
Payer: COMMERCIAL

## 2025-02-25 VITALS
HEART RATE: 105 BPM | DIASTOLIC BLOOD PRESSURE: 80 MMHG | SYSTOLIC BLOOD PRESSURE: 130 MMHG | OXYGEN SATURATION: 95 % | HEIGHT: 64.5 IN | BODY MASS INDEX: 33.05 KG/M2 | RESPIRATION RATE: 16 BRPM | WEIGHT: 196 LBS

## 2025-02-25 DIAGNOSIS — J98.09 BRONCHIAL STENOSIS: ICD-10-CM

## 2025-02-25 DIAGNOSIS — R06.00 DYSPNEA, UNSPECIFIED TYPE: ICD-10-CM

## 2025-02-25 DIAGNOSIS — J18.9 PNEUMONIA OF RIGHT LUNG DUE TO INFECTIOUS ORGANISM, UNSPECIFIED PART OF LUNG: ICD-10-CM

## 2025-02-25 DIAGNOSIS — J96.11 CHRONIC RESPIRATORY FAILURE WITH HYPOXIA (HCC): ICD-10-CM

## 2025-02-25 DIAGNOSIS — J44.9 CHRONIC OBSTRUCTIVE PULMONARY DISEASE, UNSPECIFIED COPD TYPE (HCC): Primary | ICD-10-CM

## 2025-02-25 PROCEDURE — 3075F SYST BP GE 130 - 139MM HG: CPT | Performed by: INTERNAL MEDICINE

## 2025-02-25 PROCEDURE — 3008F BODY MASS INDEX DOCD: CPT | Performed by: INTERNAL MEDICINE

## 2025-02-25 PROCEDURE — 99214 OFFICE O/P EST MOD 30 MIN: CPT | Performed by: INTERNAL MEDICINE

## 2025-02-25 PROCEDURE — 1160F RVW MEDS BY RX/DR IN RCRD: CPT | Performed by: INTERNAL MEDICINE

## 2025-02-25 PROCEDURE — 3079F DIAST BP 80-89 MM HG: CPT | Performed by: INTERNAL MEDICINE

## 2025-02-25 PROCEDURE — 1159F MED LIST DOCD IN RCRD: CPT | Performed by: INTERNAL MEDICINE

## 2025-02-25 RX ORDER — AZITHROMYCIN 250 MG/1
TABLET, FILM COATED ORAL
Qty: 6 TABLET | Refills: 0 | Status: SHIPPED | OUTPATIENT
Start: 2025-02-25

## 2025-02-25 NOTE — PATIENT INSTRUCTIONS
Continue the breztri  Use the albuterol nebulizer every 6 hours as needed  Complete the azithromycin course  Call/message with questions/concerns

## 2025-02-25 NOTE — PROGRESS NOTES
[facility] General Pulmonary Progress Note    History of Present Illness:  Lisbet Quinonez is a 80 year old female never smoker with PMH sarcoidosis complicated by airway involvement s/p ALEXI/BI metallic stent placed at [facility] 1996 further complicated by bronchial stenosis, CHF, HTN who presents today for follow up. She feels better since the hospitalization although had some more cough/congestion yesterday and today, subjective fever. No new pain. Dyspnea improved.    Jan 2025 previously -hospital HPI  Lisbet Velarde is a a(n) 80 year old female never smoker with PMH sarcoidosis complicated by airway involvement s/p ALEXI/BI metallic stent placed at [facility] 1996 further complicated by bronchial stenosis, CHF, HTN who is admitted with pneumonia. She reports having worsening cough and right back/flank pain over the past few weeks.  She is using home o2 and an oxygen cylinder fell onto her left foot leading to her ER evaluation. While there she underwent workup revealing new infiltrates and possible effusion on the right and now admitted for pneumonia. She feels somewhat better, still with cough dyspnea and pain     June 2023 previously  She denies acute concerns today. Breathing is stable. no cough at present, has been better controlled recently. No fevers. No pain    March 2023 Previously  She denies new concerns today, but does admit to having recent worsening cough with green phlegm a couple weeks ago, called her PCP and managed on steroids and abx, now feels better. Dyspnea overall is worse over the last few years, today is stable and better than when hospitalized. No fevers. No chest pain/pressure.     Dec 2023 Previously  She has followed with me the last few years for sarcoidosis and asthma/COPD, but was last seen in 2020.  She was just hospitalized in mid November 2022 with pneumonia and feels better since discharge, but not baseline. Still fatigued and having back pain. did have scant hemoptysis leading to her  hospitalization, now resolved. Still with white/clear phlegm. Her breathing has improved, using brezri twice a day. Has albuterol mdi and nebulizers but rarely uses. We had previously given her VEST and saline nebs but she did like either therapy and discontinued them. She denies chest pain/pressure, pleurisy, f/c/s.     Past Medical History:   Past Medical History:    Arthritis    Bronchial obstruction s/p right mainstem stent    Congestive heart disease (HCC)    COPD (chronic obstructive pulmonary disease) (HCC)    Diabetes mellitus (HCC)    Esophageal reflux    Hearing loss    HTN (hypertension)    Hyperlipidemia    Osteoarthritis    Sarcoidosis        Past Surgical History:   Past Surgical History:   Procedure Laterality Date    Appendectomy      Hysterectomy      Joaquin biopsy stereo nodule 2 site bilat (cpt=19081/11736)      2015    Removal of lung,lobectomy      Tonsillectomy      Total abdom hysterectomy         Family Medical History:   Family History   Problem Relation Age of Onset    Ovarian Cancer Mother 76    Uterine Cancer Mother     Stroke Mother     Breast Cancer Sister 60    Breast Cancer Sister 40    Stroke Father     Mental Disorder Father     Prostate Cancer Brother         Social History:   Social History     Socioeconomic History    Marital status:      Spouse name: Not on file    Number of children: Not on file    Years of education: Not on file    Highest education level: Not on file   Occupational History    Not on file   Tobacco Use    Smoking status: Never    Smokeless tobacco: Never   Substance and Sexual Activity    Alcohol use: Never    Drug use: Never    Sexual activity: Not on file   Other Topics Concern    Not on file   Social History Narrative    Not on file     Social Drivers of Health     Food Insecurity: No Food Insecurity (1/29/2025)    Food Insecurity     Food Insecurity: Never true   Transportation Needs: No Transportation Needs (1/29/2025)    Transportation Needs     Lack  of Transportation: No     Car Seat: Not on file   Stress: Not on file   Housing Stability: Low Risk  (1/29/2025)    Housing Stability     Housing Instability: No     Housing Instability Emergency: Not on file     Crib or Bassinette: Not on file        Medications:   Current Outpatient Medications   Medication Sig Dispense Refill    azithromycin 250 MG Oral Tab take 2 tablet (500MG)  by ORAL route  every day for 1 day then 1 tablet (250 mg) by oral route once daily for 4 days 6 tablet 0    ferrous sulfate 325 (65 FE) MG Oral Tab EC Take 1 tablet (325 mg total) by mouth daily with breakfast. 30 tablet 0    pantoprazole 40 MG Oral Tab EC Take 1 tablet (40 mg total) by mouth every morning before breakfast.      tiZANidine 2 MG Oral Tab Take 1 tablet (2 mg total) by mouth 3 (three) times daily as needed (muscle spasms).      metFORMIN HCl ER, OSM, 500 MG (OSM) Oral Tablet 24 Hr Take 1 tablet (500 mg total) by mouth daily with breakfast.      amLODIPine 2.5 MG Oral Tab Take 1 tablet (2.5 mg total) by mouth daily.      benzonatate 200 MG Oral Cap Take 1 capsule (200 mg total) by mouth 3 (three) times daily as needed for cough.      Budesonide-Formoterol Fumarate 160-4.5 MCG/ACT Inhalation Aerosol Inhale 2 puffs into the lungs 2 (two) times daily as needed (shortness of breath).      ondansetron 4 MG Oral Tablet Dispersible Take 1 tablet (4 mg total) by mouth every 8 (eight) hours as needed for Nausea. 20 tablet 0    losartan 100 MG Oral Tab Take 1 tablet (100 mg total) by mouth daily.      ipratropium-albuterol 0.5-2.5 (3) MG/3ML Inhalation Solution Take 3 mL by nebulization 4 (four) times daily as needed.      HYDROcodone-acetaminophen  MG Oral Tab Take 1 tablet by mouth every 6 (six) hours as needed. 21 tablet 0    metoprolol tartrate 25 MG Oral Tab Take 1 tablet (25 mg total) by mouth 2x Daily(Beta Blocker). 60 tablet 0    atorvastatin 10 MG Oral Tab Take 1 tablet (10 mg total) by mouth nightly.      Albuterol  Sulfate  (90 Base) MCG/ACT Inhalation Aero Soln Inhale 2 puffs into the lungs every 4 (four) hours as needed for Wheezing or Shortness of Breath. CARRY YOUR INHALER WITH YOU. 1 Inhaler 0       Review of Systems: Review of Systems   Constitutional: Negative.    HENT: Negative.     Respiratory:  Positive for cough and shortness of breath. Negative for choking, wheezing and stridor.    Cardiovascular: Negative.    Gastrointestinal: Negative.    All other systems reviewed and are negative.       Physical Exam:  /80 (BP Location: Left arm, Patient Position: Sitting, Cuff Size: adult)   Pulse 105   Resp 16   Ht 5' 4.5\" (1.638 m)   Wt 196 lb (88.9 kg)   SpO2 95%   BMI 33.12 kg/m²      Constitutional: alert, cooperative. No acute distress.  HEENT: Head NC/AT.    Cardio: Regular rate and rhythm. Normal S1 and S2. No murmurs.   Respiratory: Thorax symmetrical with no labored breathing. Clear to ausculation over left lung fields. Clear over right apical fields and bronchial bs in mid to lower lung fields on right  GI: NABS. Abd soft, non-tender.  Extremities: No clubbing or cyanosis. No LE edema.    Neurologic: A&Ox3. No gross motor deficits.  Skin: Warm, dry  Psych: Calm, cooperative. Pleasant affect.    Results:  Personally reviewed    WBC: 14.6, done on 1/31/2025.  HGB: 9.4, done on 1/31/2025.  PLT: 459, done on 1/31/2025.     Glucose: 93, done on 1/31/2025.  Cr: 0.66, done on 1/31/2025.  GFR(AA): 80, done on 8/10/2021.  GFR (non-AA): 69, done on 8/10/2021.  CA: 9.4, done on 1/31/2025.  Na: 141, done on 1/31/2025.  K: 4.2, done on 1/31/2025.  Cl: 102, done on 1/31/2025.  CO2: 28, done on 1/31/2025.  Last ALB was 4.2% done on 1/29/2025.     CTA CHEST (CPT=71275)    Result Date: 1/30/2025  CONCLUSION:  1. Chronic consolidation and volume loss involving the right middle lobe and lower lobe secondary to tracheal stenosis and densely calcified granuloma/lymph nodes along the right hilum.  This appearance is  stable from prior imaging. 2. There is however increasing ground-glass opacities within the right upper lobe concerning for progressive inflammatory change versus progressive pneumonia. 3. The left lung and left pleural space are clear.    LOCATION:  [id number]   Dictated by (CST): Carole Turner DO on 1/30/2025 at 3:11 PM     Finalized by (CST): Carole Turner DO on 1/30/2025 at 3:20 PM       XR FOOT, COMPLETE (MIN 3 VIEWS), LEFT (CPT=73630)    Result Date: 1/29/2025  CONCLUSION:  No acute fracture or dislocation left foot.  There is joint space narrowing 1st through 5th IP joints and 1st MTP joint.   LOCATION:  [id number]   Dictated by (CST): Sandra Howard MD on 1/29/2025 at 12:36 PM     Finalized by (CST): Sandra Howard MD on 1/29/2025 at 12:37 PM       XR CHEST AP PORTABLE  (CPT=71045)    Result Date: 1/29/2025  CONCLUSION:  There is dense consolidation and effusion in the right midlung and lung base.  This is concerning for pneumonia.  Prominent interstitial markings in the right upper lobe and left midlung and lung base.  Stable appearing heart size.   LOCATION:  [id number]      Dictated by (CST): Sandra Howard MD on 1/29/2025 at 12:35 PM     Finalized by (CST): Sandra Howard MD on 1/29/2025 at 12:36 PM         Assessment/Plan:  Acute on chronic hypoxic respiratory failure  Reports having oxygen provided by her PCP. Reported RA at rest, 2L on exertion  CT chest shows chronic bronchial stenosis of ALEXI/BI due to stent. GGO in RUL. Collapse of RML/RLL  worsening hypoxia related to pneumonia  Treat as below  She remains hesitant to consider any procedures such as thoracentesis, chest tube or bronchoscopy     pneumonia  R sided pneumonia, may have been viral related (reports her granddaughter was sick first)  s/p abx and steroids while hospitalized 11/2022 at [facility]  2/2023 CT chest with improvement in RUL but continued consolidation in RML an RLL. Right sided airways remain occluded and stent appears in place.   1/2025 CXR  with new infiltrates and possible effusion  CT chest shows chronic bronchial stenosis of ALEXI/BI due to stent. GGO in RUL. Collapse of RML/RLL  Completed treatment post hosptalization with cefpodoxime. Now with recurrent sx, will treat with zpak  Continue nebs/airway clearance  We have discussed previously about performing bronchoscopy and she has remained hesitant to consider any procedure due to concern for risk of complications and reiterates that today     bronchiectasis  Noted on imaging with right sided bronchiectasis related to chronic right main stem/ bronchus intermedius bronchial stenosis  We have attempted several therapies in the past including VEST and saline nebulizers but she did not like either so discontinued them  continue nebs while here and flutter valve  On breztri BID - will josé  Previously advised she reconsider saline nebs should she develop phlegm. Does not want VEST     COPD/asthma  Never smoker but history of sarcoidosis s/p ALEXI/BI stenting  Continue inhalers - breztri BID and albuterol mdi/neb prn  Completed pulm rehab at [facility] in 2019; advised to consider reenrolling   Treat pneumonia as above     Bronchial stenosis  Complication from sarcoidosis with placement of ALEXI/BI metallic stent at [facility] in 1996  I have had multiple conversations with the patient over the years regarding the chronic stent and the stenosis. I suspect the stent is endothelialized and would be quite difficult to consider removal and because of this, the stenosis will likely be irreversible. She is aware the stenosis could be easily occluded and again advise she use her inhalers, nebs and airway clearance therapies to maintain patency. We have also reviewed bronchoscopy to evaluate the airway but is clear she does NOT want to consider any invasive procedures and is aware the stenosis could worsen. She prefers to approach her therapy with goal to maintain her breathing as comfortable with no procedures  Appreciate  palliative care eval    Ramos Urrutia MD

## 2025-03-26 ENCOUNTER — LAB ENCOUNTER (OUTPATIENT)
Dept: LAB | Facility: HOSPITAL | Age: 80
End: 2025-03-26
Attending: STUDENT IN AN ORGANIZED HEALTH CARE EDUCATION/TRAINING PROGRAM
Payer: MEDICARE

## 2025-03-26 DIAGNOSIS — E55.9 AVITAMINOSIS D: ICD-10-CM

## 2025-03-26 DIAGNOSIS — Z11.9 SCREENING EXAMINATION FOR INFECTIOUS DISEASE: ICD-10-CM

## 2025-03-26 DIAGNOSIS — E11.65 INADEQUATELY CONTROLLED DIABETES MELLITUS (HCC): ICD-10-CM

## 2025-03-26 DIAGNOSIS — E78.2 MIXED HYPERLIPIDEMIA: Primary | ICD-10-CM

## 2025-03-26 LAB
ALBUMIN SERPL-MCNC: 4.8 G/DL (ref 3.2–4.8)
ALBUMIN/GLOB SERPL: 1.3 {RATIO} (ref 1–2)
ALP LIVER SERPL-CCNC: 107 U/L
ALT SERPL-CCNC: 8 U/L
ANION GAP SERPL CALC-SCNC: 7 MMOL/L (ref 0–18)
AST SERPL-CCNC: 16 U/L (ref ?–34)
BASOPHILS # BLD AUTO: 0.05 X10(3) UL (ref 0–0.2)
BASOPHILS NFR BLD AUTO: 0.4 %
BILIRUB SERPL-MCNC: 0.6 MG/DL (ref 0.2–1.1)
BUN BLD-MCNC: 20 MG/DL (ref 9–23)
CALCIUM BLD-MCNC: 10 MG/DL (ref 8.7–10.6)
CHLORIDE SERPL-SCNC: 103 MMOL/L (ref 98–112)
CHOLEST SERPL-MCNC: 156 MG/DL (ref ?–200)
CO2 SERPL-SCNC: 32 MMOL/L (ref 21–32)
CREAT BLD-MCNC: 0.88 MG/DL
DEPRECATED HBV CORE AB SER IA-ACNC: 45 NG/ML
EGFRCR SERPLBLD CKD-EPI 2021: 67 ML/MIN/1.73M2 (ref 60–?)
EOSINOPHIL # BLD AUTO: 0.4 X10(3) UL (ref 0–0.7)
EOSINOPHIL NFR BLD AUTO: 3.2 %
ERYTHROCYTE [DISTWIDTH] IN BLOOD BY AUTOMATED COUNT: 14 %
EST. AVERAGE GLUCOSE BLD GHB EST-MCNC: 126 MG/DL (ref 68–126)
FASTING PATIENT LIPID ANSWER: YES
FASTING STATUS PATIENT QL REPORTED: YES
FOLATE SERPL-MCNC: 13.3 NG/ML (ref 5.4–?)
GLOBULIN PLAS-MCNC: 3.6 G/DL (ref 2–3.5)
GLUCOSE BLD-MCNC: 110 MG/DL (ref 70–99)
HBA1C MFR BLD: 6 % (ref ?–5.7)
HCT VFR BLD AUTO: 34.1 %
HDLC SERPL-MCNC: 48 MG/DL (ref 40–59)
HGB BLD-MCNC: 10.2 G/DL
IMM GRANULOCYTES # BLD AUTO: 0.02 X10(3) UL (ref 0–1)
IMM GRANULOCYTES NFR BLD: 0.2 %
LDLC SERPL CALC-MCNC: 91 MG/DL (ref ?–100)
LYMPHOCYTES # BLD AUTO: 7.83 X10(3) UL (ref 1–4)
LYMPHOCYTES NFR BLD AUTO: 62.3 %
MCH RBC QN AUTO: 26.4 PG (ref 26–34)
MCHC RBC AUTO-ENTMCNC: 29.9 G/DL (ref 31–37)
MCV RBC AUTO: 88.1 FL
MONOCYTES # BLD AUTO: 0.9 X10(3) UL (ref 0.1–1)
MONOCYTES NFR BLD AUTO: 7.2 %
NEUTROPHILS # BLD AUTO: 3.37 X10 (3) UL (ref 1.5–7.7)
NEUTROPHILS # BLD AUTO: 3.37 X10(3) UL (ref 1.5–7.7)
NEUTROPHILS NFR BLD AUTO: 26.7 %
NONHDLC SERPL-MCNC: 108 MG/DL (ref ?–130)
OSMOLALITY SERPL CALC.SUM OF ELEC: 297 MOSM/KG (ref 275–295)
PLATELET # BLD AUTO: 307 10(3)UL (ref 150–450)
POTASSIUM SERPL-SCNC: 4.2 MMOL/L (ref 3.5–5.1)
PROT SERPL-MCNC: 8.4 G/DL (ref 5.7–8.2)
RBC # BLD AUTO: 3.87 X10(6)UL
SODIUM SERPL-SCNC: 142 MMOL/L (ref 136–145)
TRIGL SERPL-MCNC: 93 MG/DL (ref 30–149)
TSI SER-ACNC: 1.61 UIU/ML (ref 0.55–4.78)
VIT B12 SERPL-MCNC: 656 PG/ML (ref 211–911)
VIT D+METAB SERPL-MCNC: 36 NG/ML (ref 30–100)
VLDLC SERPL CALC-MCNC: 15 MG/DL (ref 0–30)
WBC # BLD AUTO: 12.6 X10(3) UL (ref 4–11)

## 2025-03-26 PROCEDURE — 80053 COMPREHEN METABOLIC PANEL: CPT

## 2025-03-26 PROCEDURE — 82746 ASSAY OF FOLIC ACID SERUM: CPT

## 2025-03-26 PROCEDURE — 83036 HEMOGLOBIN GLYCOSYLATED A1C: CPT

## 2025-03-26 PROCEDURE — 82306 VITAMIN D 25 HYDROXY: CPT

## 2025-03-26 PROCEDURE — 82607 VITAMIN B-12: CPT

## 2025-03-26 PROCEDURE — 84443 ASSAY THYROID STIM HORMONE: CPT

## 2025-03-26 PROCEDURE — 85025 COMPLETE CBC W/AUTO DIFF WBC: CPT

## 2025-03-26 PROCEDURE — 36415 COLL VENOUS BLD VENIPUNCTURE: CPT

## 2025-03-26 PROCEDURE — 80061 LIPID PANEL: CPT

## 2025-03-26 PROCEDURE — 82728 ASSAY OF FERRITIN: CPT

## 2025-04-18 RX ORDER — CEFPODOXIME PROXETIL 200 MG/1
200 TABLET, FILM COATED ORAL 2 TIMES DAILY
Qty: 10 TABLET | Refills: 0 | OUTPATIENT
Start: 2025-04-18 | End: 2025-04-23

## 2025-04-18 NOTE — TELEPHONE ENCOUNTER
Received request for:Cefpodoxime    Patient last seen by: Dr. Urrutia 2-25-25    Has scheduled appointment: 5-27-25    Physician recommendation:Return in 3 months.  Complete course of azithromycin.

## 2025-04-18 NOTE — TELEPHONE ENCOUNTER
Call placed to pt.  Pt requested refill of Cefpodoxime due to her chronic lung condition.  Pt states that she has been coughing up green colored mucus and last night she states that she was miserable.  Daughter gave he some cough syrup and she states that helped.  She also did a breathing treatment.  She does not know if she has a fever but feels warm.  She states that her body aches but denies chills.  She has been short of breath and is using her oxygen but her pulse ox is not readily available to check her O2 sats.  Pt advised to go to ER with decline in respiratory status.  Message forwarded to Dr. Urrutia and MICKY Adam.

## 2025-08-21 DIAGNOSIS — I11.9 MALIGNANT HYPERTENSIVE HEART DISEASE WITHOUT HEART FAILURE: Primary | ICD-10-CM

## 2025-08-21 DIAGNOSIS — E78.2 MIXED HYPERLIPIDEMIA: ICD-10-CM

## 2025-08-21 DIAGNOSIS — E07.9 DISEASE OF THYROID GLAND: ICD-10-CM

## 2025-08-21 DIAGNOSIS — R73.01 IMPAIRED FASTING GLUCOSE: ICD-10-CM

## (undated) NOTE — ED AVS SNAPSHOT
La Nena Simon   MRN: [id number]    Department:  [facility] Emergency Department   Date of Visit:  4/18/2018           Disclosure     Insurance plans vary and the physician(s) referred by the ER may not be covered by your plan.  Please conta tell this physician (or your personal doctor if your instructions are to return to your personal doctor) about any new or lasting problems. The primary care or specialist physician will see patients referred from the [facility] Emergency Department.  Desi Blandon

## (undated) NOTE — ED AVS SNAPSHOT
Donell Bosworth   MRN: [id number]    Department:  [facility] Emergency Department   Date of Visit:  2/20/2018           Disclosure     Insurance plans vary and the physician(s) referred by the ER may not be covered by your plan.  Please conta tell this physician (or your personal doctor if your instructions are to return to your personal doctor) about any new or lasting problems. The primary care or specialist physician will see patients referred from the [facility] Emergency Department.  Arthor Bernheim